# Patient Record
Sex: FEMALE | Race: WHITE | Employment: FULL TIME | URBAN - METROPOLITAN AREA
[De-identification: names, ages, dates, MRNs, and addresses within clinical notes are randomized per-mention and may not be internally consistent; named-entity substitution may affect disease eponyms.]

---

## 2017-01-23 RX ORDER — BUDESONIDE AND FORMOTEROL FUMARATE DIHYDRATE 80; 4.5 UG/1; UG/1
2 AEROSOL RESPIRATORY (INHALATION) 2 TIMES DAILY
Qty: 1 INHALER | Refills: 5 | Status: SHIPPED | OUTPATIENT
Start: 2017-01-23 | End: 2017-08-13 | Stop reason: SDUPTHER

## 2017-01-23 RX ORDER — OMEPRAZOLE 20 MG/1
CAPSULE, DELAYED RELEASE ORAL
Qty: 30 CAPSULE | Refills: 11 | Status: SHIPPED | OUTPATIENT
Start: 2017-01-23 | End: 2017-09-11 | Stop reason: SDUPTHER

## 2017-01-23 RX ORDER — MOMETASONE FUROATE 220 UG/1
INHALANT RESPIRATORY (INHALATION)
Qty: 1 INHALER | Refills: 5 | Status: SHIPPED | OUTPATIENT
Start: 2017-01-23 | End: 2017-09-15 | Stop reason: SDUPTHER

## 2017-07-18 RX ORDER — MONTELUKAST SODIUM 10 MG/1
TABLET ORAL
Qty: 30 TABLET | Refills: 11 | Status: SHIPPED | OUTPATIENT
Start: 2017-07-18 | End: 2018-08-14 | Stop reason: SDUPTHER

## 2017-08-14 RX ORDER — DILTIAZEM HYDROCHLORIDE 60 MG/1
TABLET, FILM COATED ORAL
Qty: 1 INHALER | Refills: 5 | Status: SHIPPED | OUTPATIENT
Start: 2017-08-14 | End: 2017-09-11 | Stop reason: SDUPTHER

## 2017-09-11 ENCOUNTER — OFFICE VISIT (OUTPATIENT)
Dept: FAMILY MEDICINE CLINIC | Age: 62
End: 2017-09-11
Payer: COMMERCIAL

## 2017-09-11 VITALS
DIASTOLIC BLOOD PRESSURE: 70 MMHG | HEIGHT: 61 IN | HEART RATE: 78 BPM | WEIGHT: 121 LBS | BODY MASS INDEX: 22.84 KG/M2 | SYSTOLIC BLOOD PRESSURE: 142 MMHG

## 2017-09-11 DIAGNOSIS — S46.002A: Primary | ICD-10-CM

## 2017-09-11 PROCEDURE — 99213 OFFICE O/P EST LOW 20 MIN: CPT | Performed by: FAMILY MEDICINE

## 2017-09-11 RX ORDER — PREDNISONE 20 MG/1
TABLET ORAL
Qty: 20 TABLET | Refills: 0 | Status: SHIPPED | OUTPATIENT
Start: 2017-09-11 | End: 2017-11-09 | Stop reason: ALTCHOICE

## 2017-09-11 ASSESSMENT — ENCOUNTER SYMPTOMS
DIARRHEA: 0
SHORTNESS OF BREATH: 0
SINUS PRESSURE: 0
SORE THROAT: 0
COUGH: 0
BACK PAIN: 0
NAUSEA: 0
VOMITING: 0

## 2017-09-11 ASSESSMENT — PATIENT HEALTH QUESTIONNAIRE - PHQ9
1. LITTLE INTEREST OR PLEASURE IN DOING THINGS: 0
SUM OF ALL RESPONSES TO PHQ QUESTIONS 1-9: 0
2. FEELING DOWN, DEPRESSED OR HOPELESS: 0
SUM OF ALL RESPONSES TO PHQ9 QUESTIONS 1 & 2: 0

## 2017-09-15 RX ORDER — MOMETASONE FUROATE 220 UG/1
INHALANT RESPIRATORY (INHALATION)
Qty: 3 INHALER | Refills: 3 | Status: SHIPPED | OUTPATIENT
Start: 2017-09-15 | End: 2018-02-19 | Stop reason: CLARIF

## 2017-11-09 ENCOUNTER — OFFICE VISIT (OUTPATIENT)
Dept: FAMILY MEDICINE CLINIC | Age: 62
End: 2017-11-09
Payer: COMMERCIAL

## 2017-11-09 VITALS
BODY MASS INDEX: 22.48 KG/M2 | HEART RATE: 83 BPM | SYSTOLIC BLOOD PRESSURE: 120 MMHG | WEIGHT: 119 LBS | DIASTOLIC BLOOD PRESSURE: 72 MMHG

## 2017-11-09 DIAGNOSIS — Z23 IMMUNIZATION DUE: ICD-10-CM

## 2017-11-09 DIAGNOSIS — K12.1 SOFT PALATE ULCERATION: Primary | ICD-10-CM

## 2017-11-09 PROCEDURE — 90688 IIV4 VACCINE SPLT 0.5 ML IM: CPT | Performed by: FAMILY MEDICINE

## 2017-11-09 PROCEDURE — 90471 IMMUNIZATION ADMIN: CPT | Performed by: FAMILY MEDICINE

## 2017-11-09 PROCEDURE — 99213 OFFICE O/P EST LOW 20 MIN: CPT | Performed by: FAMILY MEDICINE

## 2017-11-09 RX ORDER — CEPHALEXIN 500 MG/1
500 CAPSULE ORAL 3 TIMES DAILY
Qty: 30 CAPSULE | Refills: 0 | Status: SHIPPED | OUTPATIENT
Start: 2017-11-09 | End: 2018-02-19 | Stop reason: ALTCHOICE

## 2017-11-09 ASSESSMENT — ENCOUNTER SYMPTOMS
COUGH: 0
SORE THROAT: 0
NAUSEA: 0
DIARRHEA: 0
VOMITING: 0
SHORTNESS OF BREATH: 0
SINUS PRESSURE: 0
BACK PAIN: 0

## 2017-11-09 NOTE — TELEPHONE ENCOUNTER
Health Maintenance   Topic Date Due    Hepatitis C screen  1955    HIV screen  01/22/1969    DTaP/Tdap/Td vaccine (1 - Tdap) 01/22/1973    Cervical cancer screen  01/22/1975    Lipid screen  01/22/1994    Colon cancer screen colonoscopy  12/12/2016    Breast cancer screen  06/15/2017    Zostavax vaccine  Addressed    Flu vaccine  Completed    Pneumococcal med risk  Completed       No results found for: LABA1C          ( goal A1C is < 7)   No results found for: LABMICR  No results found for: LDLCHOLESTEROL, LDLCALC    (goal LDL is <100)   No results found for: AST, ALT, BUN  BP Readings from Last 3 Encounters:   11/09/17 120/72   09/11/17 (!) 142/70   12/28/16 110/80          (goal 120/80)    All Future Testing planned in [facility]      Next Visit Date:  No future appointments.          Patient Active Problem List:     GERD (gastroesophageal reflux disease)     Elevated lipids     Asthma

## 2017-11-09 NOTE — PROGRESS NOTES
Jeffery Jaime is a 60 y.o. female who presents today for her medical conditions/complaints as noted below. Jeffery Jaime is c/o of Mouth Injury (bleed on roof of mouth) and Health Maintenance (flu, colon)  Noticed some irritation and bleeding of her soft palate last pm.    HPI:     Visit Information    Have you changed or started any medications since your last visit including any over-the-counter medicines, vitamins, or herbal medicines? no   Are you having any side effects from any of your medications? -  no  Have you stopped taking any of your medications? Is so, why? -  no    Have you seen any other physician or provider since your last visit? No  Have you had any other diagnostic tests since your last visit? No  Have you been seen in the emergency room and/or had an admission to a hospital since we last saw you? No  Have you had your routine dental cleaning in the past 6 months? yes -     Have you activated your [email or web address] account? If not, what are your barriers? Yes     Patient Care Team:  Shakira Silver MD as PCP - General (Family Medicine)  Shakira Silver MD as PCP - S Attributed Provider    Medical History Review  Past Medical, Family, and Social History reviewed and  contribute to the patient presenting condition    Health Maintenance   Topic Date Due    Hepatitis C screen  1955    HIV screen  01/22/1969    DTaP/Tdap/Td vaccine (1 - Tdap) 01/22/1973    Cervical cancer screen  01/22/1975    Lipid screen  01/22/1994    Colon cancer screen colonoscopy  12/12/2016    Breast cancer screen  06/15/2017    Flu vaccine (1) 09/01/2017    Zostavax vaccine  Addressed    Pneumococcal med risk  Completed       Past Medical History:   Diagnosis Date    GERD (gastroesophageal reflux disease)     Hyperlipidemia     RAD (reactive airway disease)       Past Surgical History:   Procedure Laterality Date    COLONOSCOPY  2007; due 2017     No family history on file.   Social History Mouth/Throat: Oral lesions (ulceration and soft tissue swelling under dentures of soft palate right of midline.) present. No oropharyngeal exudate. Eyes: No scleral icterus. Neck: Neck supple. Carotid bruit is not present. Cardiovascular: Exam reveals no gallop and no friction rub. No murmur heard. Pulmonary/Chest: No respiratory distress. She has no wheezes. She has no rales. She exhibits no tenderness. Musculoskeletal: She exhibits no edema. Lymphadenopathy:     She has no cervical adenopathy. Neurological: She is alert and oriented to person, place, and time. No cranial nerve deficit. Skin: No rash noted. She is not diaphoretic. Psychiatric: She has a normal mood and affect. Her behavior is normal. Judgment and thought content normal.       Assessment:      1. Soft palate ulceration  cephALEXin (KEFLEX) 500 MG capsule   2. Immunization due  INFLUENZA, QUADV, 3 YRS AND OLDER, IM, MDV, 0.5ML ([facility])         Plan:      No Follow-up on file.     Orders Placed This Encounter   Procedures    INFLUENZA, QUADV, 3 YRS AND OLDER, IM, MDV, 0.5ML (FLUZONE QUADV)     Orders Placed This Encounter   Medications    cephALEXin (KEFLEX) 500 MG capsule     Sig: Take 1 capsule by mouth 3 times daily     Dispense:  30 capsule     Refill:  0

## 2017-11-10 RX ORDER — BUDESONIDE AND FORMOTEROL FUMARATE DIHYDRATE 80; 4.5 UG/1; UG/1
AEROSOL RESPIRATORY (INHALATION)
Qty: 1 INHALER | Refills: 5 | Status: SHIPPED | OUTPATIENT
Start: 2017-11-10 | End: 2018-02-22 | Stop reason: SDUPTHER

## 2018-02-14 RX ORDER — OMEPRAZOLE 20 MG/1
CAPSULE, DELAYED RELEASE ORAL
Qty: 30 CAPSULE | Refills: 11 | Status: SHIPPED | OUTPATIENT
Start: 2018-02-14 | End: 2018-02-21 | Stop reason: SDUPTHER

## 2018-02-19 ENCOUNTER — OFFICE VISIT (OUTPATIENT)
Dept: FAMILY MEDICINE CLINIC | Age: 63
End: 2018-02-19
Payer: COMMERCIAL

## 2018-02-19 VITALS
WEIGHT: 125 LBS | HEART RATE: 78 BPM | DIASTOLIC BLOOD PRESSURE: 80 MMHG | SYSTOLIC BLOOD PRESSURE: 140 MMHG | OXYGEN SATURATION: 94 % | BODY MASS INDEX: 23.62 KG/M2

## 2018-02-19 DIAGNOSIS — J45.909 MODERATE ASTHMA WITHOUT COMPLICATION, UNSPECIFIED WHETHER PERSISTENT: Primary | Chronic | ICD-10-CM

## 2018-02-19 PROCEDURE — 99213 OFFICE O/P EST LOW 20 MIN: CPT | Performed by: FAMILY MEDICINE

## 2018-02-19 RX ORDER — ALBUTEROL SULFATE 90 UG/1
2 AEROSOL, METERED RESPIRATORY (INHALATION) EVERY 6 HOURS PRN
Qty: 1 INHALER | Refills: 3 | Status: SHIPPED | OUTPATIENT
Start: 2018-02-19 | End: 2020-10-26 | Stop reason: SDUPTHER

## 2018-02-19 ASSESSMENT — ENCOUNTER SYMPTOMS
SHORTNESS OF BREATH: 0
SORE THROAT: 0
DIARRHEA: 0
VOMITING: 0
NAUSEA: 0
SINUS PRESSURE: 0
COUGH: 1
BACK PAIN: 0

## 2018-02-21 RX ORDER — OMEPRAZOLE 20 MG/1
CAPSULE, DELAYED RELEASE ORAL
Qty: 30 CAPSULE | Refills: 11 | Status: SHIPPED | OUTPATIENT
Start: 2018-02-21 | End: 2019-03-02 | Stop reason: SDUPTHER

## 2018-02-21 NOTE — TELEPHONE ENCOUNTER
Next Visit Date:  No future appointments.     Health Maintenance   Topic Date Due    Hepatitis C screen  1955    HIV screen  01/22/1969    DTaP/Tdap/Td vaccine (1 - Tdap) 01/22/1973    Cervical cancer screen  01/22/1975    Lipid screen  01/22/1994    Shingles Vaccine (1 of 2 - 2 Dose Series) 01/22/2004    Colon cancer screen colonoscopy  12/12/2016    Breast cancer screen  06/15/2017    Flu vaccine  Completed    Pneumococcal med risk  Completed       No results found for: LABA1C          ( goal A1C is < 7)   No results found for: LABMICR  No results found for: LDLCHOLESTEROL, LDLCALC    (goal LDL is <100)   No results found for: AST, ALT, BUN  BP Readings from Last 3 Encounters:   02/19/18 (!) 140/80   11/09/17 120/72   09/11/17 (!) 142/70          (goal 120/80)    All Future Testing planned in [facility]              Patient Active Problem List:     GERD (gastroesophageal reflux disease)     Elevated lipids     Asthma

## 2018-02-22 RX ORDER — BUDESONIDE AND FORMOTEROL FUMARATE DIHYDRATE 80; 4.5 UG/1; UG/1
AEROSOL RESPIRATORY (INHALATION)
Qty: 1 INHALER | Refills: 5 | Status: SHIPPED | OUTPATIENT
Start: 2018-02-22 | End: 2018-10-17 | Stop reason: SDUPTHER

## 2018-02-27 ENCOUNTER — TELEPHONE (OUTPATIENT)
Dept: FAMILY MEDICINE CLINIC | Age: 63
End: 2018-02-27

## 2018-06-08 ENCOUNTER — OFFICE VISIT (OUTPATIENT)
Dept: FAMILY MEDICINE CLINIC | Age: 63
End: 2018-06-08
Payer: COMMERCIAL

## 2018-06-08 VITALS
HEART RATE: 86 BPM | OXYGEN SATURATION: 98 % | DIASTOLIC BLOOD PRESSURE: 84 MMHG | BODY MASS INDEX: 23.24 KG/M2 | SYSTOLIC BLOOD PRESSURE: 130 MMHG | WEIGHT: 123 LBS

## 2018-06-08 DIAGNOSIS — H66.001 ACUTE SUPPURATIVE OTITIS MEDIA OF RIGHT EAR WITHOUT SPONTANEOUS RUPTURE OF TYMPANIC MEMBRANE, RECURRENCE NOT SPECIFIED: Primary | ICD-10-CM

## 2018-06-08 DIAGNOSIS — R53.83 FATIGUE, UNSPECIFIED TYPE: ICD-10-CM

## 2018-06-08 DIAGNOSIS — J45.909 MODERATE ASTHMA WITHOUT COMPLICATION, UNSPECIFIED WHETHER PERSISTENT: ICD-10-CM

## 2018-06-08 DIAGNOSIS — R06.02 SOB (SHORTNESS OF BREATH): ICD-10-CM

## 2018-06-08 PROCEDURE — 99214 OFFICE O/P EST MOD 30 MIN: CPT | Performed by: FAMILY MEDICINE

## 2018-06-08 RX ORDER — AMOXICILLIN 875 MG/1
875 TABLET, COATED ORAL 2 TIMES DAILY
Qty: 20 TABLET | Refills: 0 | Status: SHIPPED | OUTPATIENT
Start: 2018-06-08 | End: 2018-06-18

## 2018-07-06 ENCOUNTER — OFFICE VISIT (OUTPATIENT)
Dept: FAMILY MEDICINE CLINIC | Age: 63
End: 2018-07-06
Payer: COMMERCIAL

## 2018-07-06 VITALS
DIASTOLIC BLOOD PRESSURE: 74 MMHG | SYSTOLIC BLOOD PRESSURE: 130 MMHG | BODY MASS INDEX: 23.05 KG/M2 | HEART RATE: 83 BPM | WEIGHT: 122 LBS

## 2018-07-06 DIAGNOSIS — R23.8 SKIN SENSITIVITY: Primary | ICD-10-CM

## 2018-07-06 DIAGNOSIS — H66.001 ACUTE SUPPURATIVE OTITIS MEDIA OF RIGHT EAR WITHOUT SPONTANEOUS RUPTURE OF TYMPANIC MEMBRANE, RECURRENCE NOT SPECIFIED: ICD-10-CM

## 2018-07-06 PROCEDURE — 99213 OFFICE O/P EST LOW 20 MIN: CPT | Performed by: FAMILY MEDICINE

## 2018-07-06 RX ORDER — CLOTRIMAZOLE AND BETAMETHASONE DIPROPIONATE 10; .64 MG/G; MG/G
CREAM TOPICAL
Qty: 15 G | Refills: 1 | Status: SHIPPED | OUTPATIENT
Start: 2018-07-06 | End: 2018-10-05 | Stop reason: SDUPTHER

## 2018-07-06 ASSESSMENT — ENCOUNTER SYMPTOMS
COUGH: 0
VOMITING: 0
SINUS PRESSURE: 0
SHORTNESS OF BREATH: 0
BACK PAIN: 0
SORE THROAT: 0
DIARRHEA: 0
NAUSEA: 0

## 2018-08-14 RX ORDER — MONTELUKAST SODIUM 10 MG/1
TABLET ORAL
Qty: 30 TABLET | Refills: 11 | Status: SHIPPED | OUTPATIENT
Start: 2018-08-14 | End: 2019-09-18 | Stop reason: SDUPTHER

## 2018-08-14 NOTE — TELEPHONE ENCOUNTER
Health Maintenance   Topic Date Due    Hepatitis C screen  1955    HIV screen  01/22/1969    DTaP/Tdap/Td vaccine (1 - Tdap) 01/22/1973    Cervical cancer screen  01/22/1975    Lipid screen  01/22/1994    Shingles Vaccine (1 of 2 - 2 Dose Series) 01/22/2004    Colon cancer screen colonoscopy  12/12/2016    Breast cancer screen  06/15/2017    Flu vaccine (1) 09/01/2018    Pneumococcal med risk  Completed       No results found for: LABA1C          ( goal A1C is < 7)   No results found for: LABMICR  No results found for: LDLCHOLESTEROL, LDLCALC    (goal LDL is <100)   No results found for: AST, ALT, BUN  BP Readings from Last 3 Encounters:   07/06/18 130/74   06/08/18 130/84   02/19/18 (!) 140/80          (goal 120/80)    All Future Testing planned in [facility]  Lab Frequency Next Occurrence   Comprehensive Metabolic Panel Once 75/82/1734   CBC Auto Differential Once 06/08/2018   TSH with Reflex Once 06/08/2018       Next Visit Date:  No future appointments.          Patient Active Problem List:     GERD (gastroesophageal reflux disease)     Elevated lipids     Asthma

## 2018-10-05 DIAGNOSIS — R23.8 SKIN SENSITIVITY: ICD-10-CM

## 2018-10-08 RX ORDER — CLOTRIMAZOLE AND BETAMETHASONE DIPROPIONATE 10; .64 MG/G; MG/G
CREAM TOPICAL
Qty: 1 TUBE | Refills: 1 | Status: SHIPPED | OUTPATIENT
Start: 2018-10-08 | End: 2019-10-14 | Stop reason: SDUPTHER

## 2018-10-17 RX ORDER — DILTIAZEM HYDROCHLORIDE 60 MG/1
TABLET, FILM COATED ORAL
Qty: 1 INHALER | Refills: 5 | Status: SHIPPED | OUTPATIENT
Start: 2018-10-17 | End: 2019-06-06 | Stop reason: SDUPTHER

## 2019-02-04 ENCOUNTER — OFFICE VISIT (OUTPATIENT)
Dept: FAMILY MEDICINE CLINIC | Age: 64
End: 2019-02-04
Payer: COMMERCIAL

## 2019-02-04 VITALS
DIASTOLIC BLOOD PRESSURE: 70 MMHG | SYSTOLIC BLOOD PRESSURE: 126 MMHG | OXYGEN SATURATION: 97 % | BODY MASS INDEX: 24.8 KG/M2 | TEMPERATURE: 98.9 F | HEIGHT: 59 IN | WEIGHT: 123 LBS | HEART RATE: 83 BPM

## 2019-02-04 DIAGNOSIS — R19.7 DIARRHEA, UNSPECIFIED TYPE: Primary | ICD-10-CM

## 2019-02-04 PROCEDURE — 99213 OFFICE O/P EST LOW 20 MIN: CPT | Performed by: FAMILY MEDICINE

## 2019-02-04 RX ORDER — LOPERAMIDE HYDROCHLORIDE 2 MG/1
2 CAPSULE ORAL 4 TIMES DAILY PRN
Qty: 24 CAPSULE | Refills: 0 | Status: SHIPPED | OUTPATIENT
Start: 2019-02-04 | End: 2019-02-07 | Stop reason: SDUPTHER

## 2019-02-04 ASSESSMENT — ENCOUNTER SYMPTOMS
SHORTNESS OF BREATH: 0
SINUS PRESSURE: 0
DIARRHEA: 1
VOMITING: 0
SORE THROAT: 0
ABDOMINAL PAIN: 0
ANAL BLEEDING: 0
BACK PAIN: 0
COUGH: 1
NAUSEA: 0
ABDOMINAL DISTENTION: 0

## 2019-02-04 ASSESSMENT — PATIENT HEALTH QUESTIONNAIRE - PHQ9
SUM OF ALL RESPONSES TO PHQ QUESTIONS 1-9: 0
SUM OF ALL RESPONSES TO PHQ9 QUESTIONS 1 & 2: 0
2. FEELING DOWN, DEPRESSED OR HOPELESS: 0
1. LITTLE INTEREST OR PLEASURE IN DOING THINGS: 0
SUM OF ALL RESPONSES TO PHQ QUESTIONS 1-9: 0

## 2019-02-07 DIAGNOSIS — R19.7 DIARRHEA, UNSPECIFIED TYPE: ICD-10-CM

## 2019-02-07 RX ORDER — LOPERAMIDE HYDROCHLORIDE 2 MG/1
CAPSULE ORAL
Qty: 24 CAPSULE | Refills: 0 | Status: SHIPPED | OUTPATIENT
Start: 2019-02-07 | End: 2021-01-28

## 2019-02-22 DIAGNOSIS — Z12.39 BREAST CANCER SCREENING: Primary | ICD-10-CM

## 2019-04-01 RX ORDER — OMEPRAZOLE 20 MG/1
CAPSULE, DELAYED RELEASE ORAL
Qty: 30 CAPSULE | Refills: 3 | Status: SHIPPED | OUTPATIENT
Start: 2019-04-01 | End: 2019-08-13 | Stop reason: SDUPTHER

## 2019-06-06 RX ORDER — DILTIAZEM HYDROCHLORIDE 60 MG/1
TABLET, FILM COATED ORAL
Qty: 1 INHALER | Refills: 5 | Status: SHIPPED | OUTPATIENT
Start: 2019-06-06 | End: 2019-11-07 | Stop reason: SDUPTHER

## 2019-08-13 RX ORDER — OMEPRAZOLE 20 MG/1
CAPSULE, DELAYED RELEASE ORAL
Qty: 30 CAPSULE | Refills: 2 | Status: SHIPPED | OUTPATIENT
Start: 2019-08-13 | End: 2019-11-07 | Stop reason: SDUPTHER

## 2019-08-13 NOTE — TELEPHONE ENCOUNTER
Next Visit Date:  No future appointments.     Health Maintenance   Topic Date Due    Hepatitis C screen  1955    HIV screen  01/22/1969    DTaP/Tdap/Td vaccine (1 - Tdap) 01/22/1973    Cervical cancer screen  01/22/1975    Lipid screen  01/22/1994    Shingles Vaccine (1 of 2) 01/22/2004    Colon cancer screen colonoscopy  12/12/2016    Breast cancer screen  06/15/2017    DEXA (modify frequency per FRAX score)  01/22/2019    Pneumococcal 65+ years Vaccine (1 of 2 - PCV13) 01/22/2019    Flu vaccine (1) 09/01/2019       No results found for: LABA1C          ( goal A1C is < 7)   No results found for: LABMICR  No results found for: LDLCHOLESTEROL, LDLCALC    (goal LDL is <100)   No results found for: AST, ALT, BUN  BP Readings from Last 3 Encounters:   02/04/19 126/70   07/06/18 130/74   06/08/18 130/84          (goal 120/80)    All Future Testing planned in [facility]  Lab Frequency Next Occurrence   LINDA DIGITAL SCREEN SELF REFERRAL W OR WO CAD BILATERAL Once 02/22/2019               Patient Active Problem List:     GERD (gastroesophageal reflux disease)     Elevated lipids     Asthma

## 2019-10-14 ENCOUNTER — OFFICE VISIT (OUTPATIENT)
Dept: FAMILY MEDICINE CLINIC | Age: 64
End: 2019-10-14
Payer: COMMERCIAL

## 2019-10-14 VITALS
OXYGEN SATURATION: 98 % | BODY MASS INDEX: 25.55 KG/M2 | SYSTOLIC BLOOD PRESSURE: 118 MMHG | WEIGHT: 126.5 LBS | DIASTOLIC BLOOD PRESSURE: 76 MMHG | HEART RATE: 64 BPM

## 2019-10-14 DIAGNOSIS — J45.909 MODERATE ASTHMA WITHOUT COMPLICATION, UNSPECIFIED WHETHER PERSISTENT: Chronic | ICD-10-CM

## 2019-10-14 DIAGNOSIS — R23.8 SKIN SENSITIVITY: ICD-10-CM

## 2019-10-14 DIAGNOSIS — R42 VERTIGO: Primary | ICD-10-CM

## 2019-10-14 DIAGNOSIS — Z00.00 PHYSICAL EXAM, ANNUAL: ICD-10-CM

## 2019-10-14 PROCEDURE — 90688 IIV4 VACCINE SPLT 0.5 ML IM: CPT | Performed by: FAMILY MEDICINE

## 2019-10-14 PROCEDURE — 99213 OFFICE O/P EST LOW 20 MIN: CPT | Performed by: FAMILY MEDICINE

## 2019-10-14 PROCEDURE — 90471 IMMUNIZATION ADMIN: CPT | Performed by: FAMILY MEDICINE

## 2019-10-14 RX ORDER — MECLIZINE HYDROCHLORIDE 25 MG/1
25 TABLET ORAL 3 TIMES DAILY PRN
Qty: 30 TABLET | Refills: 2 | Status: SHIPPED | OUTPATIENT
Start: 2019-10-14 | End: 2019-10-24

## 2019-10-14 RX ORDER — CLOTRIMAZOLE AND BETAMETHASONE DIPROPIONATE 10; .64 MG/G; MG/G
CREAM TOPICAL
Qty: 1 TUBE | Refills: 2 | Status: SHIPPED | OUTPATIENT
Start: 2019-10-14 | End: 2021-01-28 | Stop reason: SDUPTHER

## 2019-10-14 ASSESSMENT — ENCOUNTER SYMPTOMS
COUGH: 0
SINUS PRESSURE: 0
VOMITING: 0
SHORTNESS OF BREATH: 0
SORE THROAT: 0
DIARRHEA: 0
BACK PAIN: 0
NAUSEA: 0

## 2019-10-20 LAB
ABSOLUTE BASO #: 0 X10E9/L (ref 0–0.9)
ABSOLUTE EOS #: 0.4 X10E9/L (ref 0–0.4)
ABSOLUTE LYMPH #: 1.4 X10E9/L (ref 1–3.5)
ABSOLUTE MONO #: 0.4 X10E9/L (ref 0–0.9)
ABSOLUTE NEUT #: 3.9 X10E9/L (ref 1.5–6.6)
ALBUMIN SERPL-MCNC: 4.1 G/DL (ref 3.2–5.3)
ALK PHOSPHATASE: 88 U/L (ref 39–130)
ALT SERPL-CCNC: 12 U/L (ref 0–31)
ANION GAP SERPL CALCULATED.3IONS-SCNC: 7 MMOL/L (ref 4–12)
AST SERPL-CCNC: 18 U/L (ref 0–41)
BASOPHILS RELATIVE PERCENT: 0.4 %
BILIRUB SERPL-MCNC: 1 MG/DL (ref 0.3–1.2)
BUN BLDV-MCNC: 14 MG/DL (ref 5–27)
CALCIUM SERPL-MCNC: 9.4 MG/DL (ref 8.5–10.5)
CHLORIDE BLD-SCNC: 106 MMOL/L (ref 98–109)
CHOLESTEROL/HDL RATIO: 5.5 (ref 1–5)
CHOLESTEROL: 260 MG/DL (ref 150–200)
CO2: 30 MMOL/L (ref 22–32)
CREAT SERPL-MCNC: 0.96 MG/DL (ref 0.4–1)
EGFR AFRICAN AMERICAN: >60 ML/MIN/1.73SQ.M
EGFR IF NONAFRICAN AMERICAN: 58 ML/MIN/1.73SQ.M
EOSINOPHILS RELATIVE PERCENT: 6.8 %
GLUCOSE: 84 MG/DL (ref 65–99)
HCT VFR BLD CALC: 40.8 % (ref 35–47)
HDLC SERPL-MCNC: 47 MG/DL
HEMOGLOBIN: 13.2 G/DL (ref 11.7–16.1)
LDL CHOLESTEROL CALCULATED: 185 MG/DL
LDL/HDL RATIO: 3.9
LYMPHOCYTE %: 22.2 %
MCH RBC QN AUTO: 25.8 PG (ref 27–35)
MCHC RBC AUTO-ENTMCNC: 32.3 G/DL (ref 31–36)
MCV RBC AUTO: 80 FL (ref 81–101)
MONOCYTES # BLD: 6.4 %
NEUTROPHILS RELATIVE PERCENT: 64.2 %
PDW BLD-RTO: 13.9 % (ref 11.5–14.7)
PLATELETS: 288 X10E9/L (ref 150–450)
PMV BLD AUTO: 8.9 FL (ref 7–12)
POTASSIUM SERPL-SCNC: 4.4 MMOL/L (ref 3.5–5)
RBC: 5.11 X10E12/L (ref 3.55–5.2)
SODIUM BLD-SCNC: 143 MMOL/L (ref 134–146)
T4 TOTAL: 7.6 UG/DL (ref 6.1–12.2)
TOTAL PROTEIN: 6.9 G/DL (ref 6–8)
TRIGL SERPL-MCNC: 138 MG/DL (ref 27–150)
TSH SERPL DL<=0.05 MIU/L-ACNC: 0.88 UIU/ML (ref 0.49–4.67)
VLDLC SERPL CALC-MCNC: 28 MG/DL (ref 0–30)
WBC: 6.1 X10E9/L (ref 4–11.8)

## 2019-10-24 RX ORDER — MONTELUKAST SODIUM 10 MG/1
TABLET ORAL
Qty: 30 TABLET | Refills: 0 | Status: SHIPPED | OUTPATIENT
Start: 2019-10-24 | End: 2019-11-07 | Stop reason: SDUPTHER

## 2019-11-07 ENCOUNTER — OFFICE VISIT (OUTPATIENT)
Dept: FAMILY MEDICINE CLINIC | Age: 64
End: 2019-11-07
Payer: COMMERCIAL

## 2019-11-07 VITALS
SYSTOLIC BLOOD PRESSURE: 118 MMHG | WEIGHT: 124.4 LBS | DIASTOLIC BLOOD PRESSURE: 80 MMHG | HEART RATE: 75 BPM | BODY MASS INDEX: 25.13 KG/M2 | OXYGEN SATURATION: 97 %

## 2019-11-07 DIAGNOSIS — E78.5 ELEVATED LIPIDS: Primary | Chronic | ICD-10-CM

## 2019-11-07 PROCEDURE — 99213 OFFICE O/P EST LOW 20 MIN: CPT | Performed by: FAMILY MEDICINE

## 2019-11-07 RX ORDER — MONTELUKAST SODIUM 10 MG/1
TABLET ORAL
Qty: 90 TABLET | Refills: 3 | Status: SHIPPED | OUTPATIENT
Start: 2019-11-07 | End: 2020-10-26 | Stop reason: SDUPTHER

## 2019-11-07 RX ORDER — BUDESONIDE AND FORMOTEROL FUMARATE DIHYDRATE 80; 4.5 UG/1; UG/1
AEROSOL RESPIRATORY (INHALATION)
Qty: 3 INHALER | Refills: 3 | Status: SHIPPED
Start: 2019-11-07 | End: 2020-08-31 | Stop reason: DRUGHIGH

## 2019-11-07 RX ORDER — OMEPRAZOLE 20 MG/1
CAPSULE, DELAYED RELEASE ORAL
Qty: 90 CAPSULE | Refills: 3 | Status: SHIPPED | OUTPATIENT
Start: 2019-11-07 | End: 2020-11-20

## 2019-11-07 ASSESSMENT — ENCOUNTER SYMPTOMS
DIARRHEA: 0
NAUSEA: 0
VOMITING: 0
SORE THROAT: 0
SINUS PRESSURE: 0
COUGH: 0
BACK PAIN: 0
SHORTNESS OF BREATH: 0

## 2020-02-19 ENCOUNTER — OFFICE VISIT (OUTPATIENT)
Dept: FAMILY MEDICINE CLINIC | Age: 65
End: 2020-02-19
Payer: MEDICARE

## 2020-02-19 VITALS
DIASTOLIC BLOOD PRESSURE: 84 MMHG | OXYGEN SATURATION: 95 % | SYSTOLIC BLOOD PRESSURE: 122 MMHG | BODY MASS INDEX: 25.17 KG/M2 | WEIGHT: 124.6 LBS | HEART RATE: 83 BPM

## 2020-02-19 PROCEDURE — 1090F PRES/ABSN URINE INCON ASSESS: CPT | Performed by: NURSE PRACTITIONER

## 2020-02-19 PROCEDURE — G8427 DOCREV CUR MEDS BY ELIG CLIN: HCPCS | Performed by: NURSE PRACTITIONER

## 2020-02-19 PROCEDURE — G8417 CALC BMI ABV UP PARAM F/U: HCPCS | Performed by: NURSE PRACTITIONER

## 2020-02-19 PROCEDURE — 4040F PNEUMOC VAC/ADMIN/RCVD: CPT | Performed by: NURSE PRACTITIONER

## 2020-02-19 PROCEDURE — G8482 FLU IMMUNIZE ORDER/ADMIN: HCPCS | Performed by: NURSE PRACTITIONER

## 2020-02-19 PROCEDURE — 99213 OFFICE O/P EST LOW 20 MIN: CPT | Performed by: NURSE PRACTITIONER

## 2020-02-19 PROCEDURE — G8400 PT W/DXA NO RESULTS DOC: HCPCS | Performed by: NURSE PRACTITIONER

## 2020-02-19 PROCEDURE — 3017F COLORECTAL CA SCREEN DOC REV: CPT | Performed by: NURSE PRACTITIONER

## 2020-02-19 PROCEDURE — 1036F TOBACCO NON-USER: CPT | Performed by: NURSE PRACTITIONER

## 2020-02-19 PROCEDURE — 1123F ACP DISCUSS/DSCN MKR DOCD: CPT | Performed by: NURSE PRACTITIONER

## 2020-02-19 ASSESSMENT — PATIENT HEALTH QUESTIONNAIRE - PHQ9
SUM OF ALL RESPONSES TO PHQ9 QUESTIONS 1 & 2: 0
2. FEELING DOWN, DEPRESSED OR HOPELESS: 0
SUM OF ALL RESPONSES TO PHQ QUESTIONS 1-9: 0
SUM OF ALL RESPONSES TO PHQ QUESTIONS 1-9: 0
1. LITTLE INTEREST OR PLEASURE IN DOING THINGS: 0

## 2020-02-19 ASSESSMENT — ENCOUNTER SYMPTOMS
DIARRHEA: 0
VOMITING: 0
NAUSEA: 0
COLOR CHANGE: 0
RESPIRATORY NEGATIVE: 1
GASTROINTESTINAL NEGATIVE: 1
ALLERGIC/IMMUNOLOGIC NEGATIVE: 1
EYES NEGATIVE: 1

## 2020-05-12 ENCOUNTER — TELEMEDICINE (OUTPATIENT)
Dept: FAMILY MEDICINE CLINIC | Age: 65
End: 2020-05-12
Payer: MEDICARE

## 2020-05-12 ENCOUNTER — HOSPITAL ENCOUNTER (OUTPATIENT)
Age: 65
Discharge: HOME | End: 2020-05-12
Payer: MEDICARE

## 2020-05-12 ENCOUNTER — HOSPITAL ENCOUNTER (OUTPATIENT)
Age: 65
Discharge: HOME | End: 2020-05-14
Payer: MEDICARE

## 2020-05-12 ENCOUNTER — NURSE TRIAGE (OUTPATIENT)
Dept: OTHER | Facility: CLINIC | Age: 65
End: 2020-05-12

## 2020-05-12 ENCOUNTER — HOSPITAL ENCOUNTER (OUTPATIENT)
Dept: GENERAL RADIOLOGY | Age: 65
Discharge: HOME | End: 2020-05-14
Payer: MEDICARE

## 2020-05-12 LAB
ABSOLUTE EOS #: 0.67 K/UL (ref 0–0.44)
ABSOLUTE IMMATURE GRANULOCYTE: <0.03 K/UL (ref 0–0.3)
ABSOLUTE LYMPH #: 2.74 K/UL (ref 1.1–3.7)
ABSOLUTE MONO #: 0.49 K/UL (ref 0.1–1.2)
ALBUMIN SERPL-MCNC: 4.2 G/DL (ref 3.5–5.2)
ALBUMIN/GLOBULIN RATIO: 1.2 (ref 1–2.5)
ALP BLD-CCNC: 95 U/L (ref 35–104)
ALT SERPL-CCNC: 13 U/L (ref 5–33)
ANION GAP SERPL CALCULATED.3IONS-SCNC: 14 MMOL/L (ref 9–17)
AST SERPL-CCNC: 24 U/L
BASOPHILS # BLD: 0 % (ref 0–2)
BASOPHILS ABSOLUTE: 0.03 K/UL (ref 0–0.2)
BILIRUB SERPL-MCNC: 0.72 MG/DL (ref 0.3–1.2)
BUN BLDV-MCNC: 13 MG/DL (ref 8–23)
BUN/CREAT BLD: NORMAL (ref 9–20)
CALCIUM SERPL-MCNC: 9.3 MG/DL (ref 8.6–10.4)
CHLORIDE BLD-SCNC: 103 MMOL/L (ref 98–107)
CO2: 26 MMOL/L (ref 20–31)
CREAT SERPL-MCNC: 0.78 MG/DL (ref 0.5–0.9)
DIFFERENTIAL TYPE: ABNORMAL
EOSINOPHILS RELATIVE PERCENT: 7 % (ref 1–4)
FERRITIN: 43 UG/L (ref 13–150)
FOLATE: >20 NG/ML
GFR AFRICAN AMERICAN: >60 ML/MIN
GFR NON-AFRICAN AMERICAN: >60 ML/MIN
GFR SERPL CREATININE-BSD FRML MDRD: NORMAL ML/MIN/{1.73_M2}
GFR SERPL CREATININE-BSD FRML MDRD: NORMAL ML/MIN/{1.73_M2}
GLUCOSE BLD-MCNC: 78 MG/DL (ref 70–99)
HCT VFR BLD CALC: 44.3 % (ref 36.3–47.1)
HEMOGLOBIN: 13.8 G/DL (ref 11.9–15.1)
IMMATURE GRANULOCYTES: 0 %
IRON SATURATION: 17 % (ref 20–55)
IRON: 65 UG/DL (ref 37–145)
LYMPHOCYTES # BLD: 27 % (ref 24–43)
MCH RBC QN AUTO: 25.8 PG (ref 25.2–33.5)
MCHC RBC AUTO-ENTMCNC: 31.2 G/DL (ref 28.4–34.8)
MCV RBC AUTO: 82.8 FL (ref 82.6–102.9)
MONOCYTES # BLD: 5 % (ref 3–12)
NRBC AUTOMATED: 0 PER 100 WBC
PDW BLD-RTO: 13.2 % (ref 11.8–14.4)
PLATELET # BLD: 321 K/UL (ref 138–453)
PLATELET ESTIMATE: ABNORMAL
PMV BLD AUTO: 10.8 FL (ref 8.1–13.5)
POTASSIUM SERPL-SCNC: 4.4 MMOL/L (ref 3.7–5.3)
RBC # BLD: 5.35 M/UL (ref 3.95–5.11)
RBC # BLD: ABNORMAL 10*6/UL
SEG NEUTROPHILS: 61 % (ref 36–65)
SEGMENTED NEUTROPHILS ABSOLUTE COUNT: 6.2 K/UL (ref 1.5–8.1)
SODIUM BLD-SCNC: 143 MMOL/L (ref 135–144)
TOTAL IRON BINDING CAPACITY: 383 UG/DL (ref 250–450)
TOTAL PROTEIN: 7.6 G/DL (ref 6.4–8.3)
UNSATURATED IRON BINDING CAPACITY: 318 UG/DL (ref 112–347)
VITAMIN B-12: 469 PG/ML (ref 232–1245)
WBC # BLD: 10.2 K/UL (ref 3.5–11.3)
WBC # BLD: ABNORMAL 10*3/UL

## 2020-05-12 PROCEDURE — 82607 VITAMIN B-12: CPT

## 2020-05-12 PROCEDURE — G8400 PT W/DXA NO RESULTS DOC: HCPCS | Performed by: FAMILY MEDICINE

## 2020-05-12 PROCEDURE — 83540 ASSAY OF IRON: CPT

## 2020-05-12 PROCEDURE — 82746 ASSAY OF FOLIC ACID SERUM: CPT

## 2020-05-12 PROCEDURE — 1123F ACP DISCUSS/DSCN MKR DOCD: CPT | Performed by: FAMILY MEDICINE

## 2020-05-12 PROCEDURE — G8427 DOCREV CUR MEDS BY ELIG CLIN: HCPCS | Performed by: FAMILY MEDICINE

## 2020-05-12 PROCEDURE — 36415 COLL VENOUS BLD VENIPUNCTURE: CPT

## 2020-05-12 PROCEDURE — 1090F PRES/ABSN URINE INCON ASSESS: CPT | Performed by: FAMILY MEDICINE

## 2020-05-12 PROCEDURE — 85025 COMPLETE CBC W/AUTO DIFF WBC: CPT

## 2020-05-12 PROCEDURE — 80053 COMPREHEN METABOLIC PANEL: CPT

## 2020-05-12 PROCEDURE — 4040F PNEUMOC VAC/ADMIN/RCVD: CPT | Performed by: FAMILY MEDICINE

## 2020-05-12 PROCEDURE — 82728 ASSAY OF FERRITIN: CPT

## 2020-05-12 PROCEDURE — 71046 X-RAY EXAM CHEST 2 VIEWS: CPT

## 2020-05-12 PROCEDURE — 83550 IRON BINDING TEST: CPT

## 2020-05-12 PROCEDURE — 99213 OFFICE O/P EST LOW 20 MIN: CPT | Performed by: FAMILY MEDICINE

## 2020-05-12 PROCEDURE — 3017F COLORECTAL CA SCREEN DOC REV: CPT | Performed by: FAMILY MEDICINE

## 2020-05-12 RX ORDER — ALBUTEROL SULFATE 90 UG/1
2 AEROSOL, METERED RESPIRATORY (INHALATION) 4 TIMES DAILY PRN
Qty: 1 INHALER | Refills: 2 | Status: SHIPPED | OUTPATIENT
Start: 2020-05-12 | End: 2020-10-26

## 2020-05-12 RX ORDER — AZITHROMYCIN 250 MG/1
TABLET, FILM COATED ORAL
Qty: 1 PACKET | Refills: 0 | Status: SHIPPED | OUTPATIENT
Start: 2020-05-12 | End: 2020-08-31

## 2020-05-12 ASSESSMENT — ENCOUNTER SYMPTOMS
SINUS PRESSURE: 0
SHORTNESS OF BREATH: 1
SORE THROAT: 0
DIARRHEA: 0
VOMITING: 0
BACK PAIN: 0
NAUSEA: 0
COUGH: 1

## 2020-05-12 NOTE — PROGRESS NOTES
2020    TELEHEALTH EVALUATION -- Audio/Visual (During PXLIB-75 public health emergency)    HPI:    Bea Swanson (:  1955) has requested an audio/video evaluation for the following concern(s):    Pt evaluated using virtual visit she is having dyspnea upon exertion for several weeks with some occasional productive cough of purulent sputum. She does not feel she has a covid related infection. Review of Systems   Constitutional: Negative for chills, diaphoresis and fever. HENT: Negative for congestion, sinus pressure and sore throat. Eyes: Negative for visual disturbance. Respiratory: Positive for cough and shortness of breath. Cardiovascular: Negative for chest pain and leg swelling. Gastrointestinal: Negative for diarrhea, nausea and vomiting. Genitourinary: Negative for dysuria, menstrual problem, urgency and vaginal discharge. Musculoskeletal: Negative for arthralgias, back pain and myalgias. Skin: Negative for rash. Neurological: Negative for weakness, numbness and headaches. Psychiatric/Behavioral: Negative for sleep disturbance. Prior to Visit Medications    Medication Sig Taking? Authorizing Provider   azithromycin (ZITHROMAX Z-MEKHI) 250 MG tablet Take 2 pills on day one then one pill daily til gone.  Yes Mil Kaiser MD   albuterol sulfate HFA (VENTOLIN HFA) 108 (90 Base) MCG/ACT inhaler Inhale 2 puffs into the lungs 4 times daily as needed for Wheezing Yes Mil Kaiser MD   montelukast (SINGULAIR) 10 MG tablet Take 1 tablet daily  Keny Poe MD   omeprazole (PRILOSEC) 20 MG delayed release capsule Take one capsule daily  Keny Poe MD   budesonide-formoterol (SYMBICORT) 80-4.5 MCG/ACT AERO INHALE TWO PUFFS BY MOUTH TWICE A DAY  Mil Kaiser MD   clotrimazole-betamethasone (LOTRISONE) 1-0.05 % cream APPLY TO AFFECTED AREA(S) TWO TIMES A DAY  Mil Kaiser MD   loperamide (IMODIUM) 2 MG capsule TAKE ONE CAPSULE BY MOUTH FOUR TIMES A DAY AS NEEDED FOR DIARRHEA  Sde Herberth Kaiser MD   albuterol sulfate HFA (PROVENTIL HFA) 108 (90 Base) MCG/ACT inhaler Inhale 2 puffs into the lungs every 6 hours as needed for Wheezing  Mora Angeles MD       Social History     Tobacco Use    Smoking status: Never Smoker    Smokeless tobacco: Never Used   Substance Use Topics    Alcohol use: No    Drug use: No        Past Medical History:   Diagnosis Date    GERD (gastroesophageal reflux disease)     Hyperlipidemia     RAD (reactive airway disease)    ,   Past Surgical History:   Procedure Laterality Date    COLONOSCOPY  2007; due 2017   ,   Social History     Tobacco Use    Smoking status: Never Smoker    Smokeless tobacco: Never Used   Substance Use Topics    Alcohol use: No    Drug use: No   , No family history on file. PHYSICAL EXAMINATION:  [ INSTRUCTIONS:  \"[x]\" Indicates a positive item  \"[]\" Indicates a negative item  -- DELETE ALL ITEMS NOT EXAMINED]  Vital Signs: (As obtained by patient/caregiver or practitioner observation)    Blood pressure-  Heart rate-    Respiratory rate-    Temperature-  Pulse oximetry-     Constitutional: [x] Appears well-developed and well-nourished [x] No apparent distress      [] Abnormal-   Mental status  [x] Alert and awake  [x] Oriented to person/place/time [x]Able to follow commands      Eyes:  EOM    [x]  Normal  [] Abnormal-  Sclera  [x]  Normal  [] Abnormal -         Discharge []  None visible  [] Abnormal -    HENT:   [x] Normocephalic, atraumatic.   [] Abnormal   [x] Mouth/Throat: Mucous membranes are moist.     External Ears [x] Normal  [] Abnormal-     Neck: [x] No visualized mass     Pulmonary/Chest: [x] Respiratory effort normal.  [x] No visualized signs of difficulty breathing or respiratory distress        [] Abnormal-      Musculoskeletal:   [x] Normal gait with no signs of ataxia         [] Normal range of motion of neck        [] Abnormal-       Neurological:        [x] No Facial Asymmetry (Cranial

## 2020-06-23 ENCOUNTER — TELEPHONE (OUTPATIENT)
Dept: FAMILY MEDICINE CLINIC | Age: 65
End: 2020-06-23

## 2020-07-10 ENCOUNTER — TELEMEDICINE (OUTPATIENT)
Dept: FAMILY MEDICINE CLINIC | Age: 65
End: 2020-07-10
Payer: MEDICARE

## 2020-07-10 PROCEDURE — 1123F ACP DISCUSS/DSCN MKR DOCD: CPT | Performed by: FAMILY MEDICINE

## 2020-07-10 PROCEDURE — 3017F COLORECTAL CA SCREEN DOC REV: CPT | Performed by: FAMILY MEDICINE

## 2020-07-10 PROCEDURE — G8428 CUR MEDS NOT DOCUMENT: HCPCS | Performed by: FAMILY MEDICINE

## 2020-07-10 PROCEDURE — 4040F PNEUMOC VAC/ADMIN/RCVD: CPT | Performed by: FAMILY MEDICINE

## 2020-07-10 PROCEDURE — G8400 PT W/DXA NO RESULTS DOC: HCPCS | Performed by: FAMILY MEDICINE

## 2020-07-10 PROCEDURE — 99213 OFFICE O/P EST LOW 20 MIN: CPT | Performed by: FAMILY MEDICINE

## 2020-07-10 PROCEDURE — 1090F PRES/ABSN URINE INCON ASSESS: CPT | Performed by: FAMILY MEDICINE

## 2020-07-10 RX ORDER — PREDNISONE 20 MG/1
TABLET ORAL
Qty: 20 TABLET | Refills: 0 | Status: SHIPPED | OUTPATIENT
Start: 2020-07-10 | End: 2020-10-26

## 2020-07-10 NOTE — PROGRESS NOTES
7/10/2020    TELEHEALTH EVALUATION -- Audio/Visual (During UZCTE-78 public health emergency)    HPI:    Sumi Gutierrez (:  1955) has requested an audio/video evaluation for the following concern(s):    Dyspnea is worsening with exertion. Rescue inhaler helps a bit and she is compliant    Review of Systems    Prior to Visit Medications    Medication Sig Taking? Authorizing Provider   predniSONE (DELTASONE) 20 MG tablet Take 3 tabs daily for 3 days, then 2 a day for 3 days, then 1 a day for 3 days, then 1/2 a day for 3 days. Yes Dru Celaya MD   azithromycin (ZITHROMAX Z-MEKHI) 250 MG tablet Take 2 pills on day one then one pill daily til gone.   Hilario Ahumada Hillard, MD   albuterol sulfate HFA (VENTOLIN HFA) 108 (90 Base) MCG/ACT inhaler Inhale 2 puffs into the lungs 4 times daily as needed for Wheezing  Mil Kaiser MD   montelukast (SINGULAIR) 10 MG tablet Take 1 tablet daily  Mil Kaiser MD   omeprazole (PRILOSEC) 20 MG delayed release capsule Take one capsule daily  Mil Kaiser MD   budesonide-formoterol (SYMBICORT) 80-4.5 MCG/ACT AERO INHALE TWO PUFFS BY MOUTH TWICE A DAY  Mil Kaiser MD   clotrimazole-betamethasone (LOTRISONE) 1-0.05 % cream APPLY TO AFFECTED AREA(S) TWO TIMES A DAY  Mil Kaiser MD   loperamide (IMODIUM) 2 MG capsule TAKE ONE CAPSULE BY MOUTH FOUR TIMES A DAY AS NEEDED FOR DIARRHEA  Mil Kaiser MD   albuterol sulfate HFA (PROVENTIL HFA) 108 (90 Base) MCG/ACT inhaler Inhale 2 puffs into the lungs every 6 hours as needed for Wheezing  Dru Celaya MD       Social History     Tobacco Use    Smoking status: Never Smoker    Smokeless tobacco: Never Used   Substance Use Topics    Alcohol use: No    Drug use: No        Past Medical History:   Diagnosis Date    GERD (gastroesophageal reflux disease)     Hyperlipidemia     RAD (reactive airway disease)    ,   Past Surgical History:   Procedure Laterality Date    COLONOSCOPY  ; due 2017   ,   Social History     Tobacco Use    Smoking status: Never Smoker    Smokeless tobacco: Never Used   Substance Use Topics    Alcohol use: No    Drug use: No   , No family history on file. PHYSICAL EXAMINATION:  [ INSTRUCTIONS:  \"[x]\" Indicates a positive item  \"[]\" Indicates a negative item  -- DELETE ALL ITEMS NOT EXAMINED]  Vital Signs: (As obtained by patient/caregiver or practitioner observation)    Blood pressure-  Heart rate-    Respiratory rate-    Temperature-  Pulse oximetry-     Constitutional: [x] Appears well-developed and well-nourished [x] No apparent distress      [] Abnormal-   Mental status  [x] Alert and awake  [] Oriented to person/place/time []Able to follow commands      Eyes:  EOM    [x]  Normal  [] Abnormal-  Sclera  [x]  Normal  [] Abnormal -         Discharge []  None visible  [] Abnormal -    HENT:   [x] Normocephalic, atraumatic. [] Abnormal   [] Mouth/Throat: Mucous membranes are moist.     External Ears [x] Normal  [] Abnormal-     Neck: [x] No visualized mass     Pulmonary/Chest: [x] Respiratory effort normal.  [] No visualized signs of difficulty breathing or respiratory distress        [] Abnormal-      Musculoskeletal:   [x] Normal gait with no signs of ataxia         [] Normal range of motion of neck        [] Abnormal-       Neurological:        [] No Facial Asymmetry (Cranial nerve 7 motor function) (limited exam to video visit)          [x] No gaze palsy        [] Abnormal-         Skin:        [] No significant exanthematous lesions or discoloration noted on facial skin         [] Abnormal-            Psychiatric:       [x] Normal Affect [] No Hallucinations        [] Abnormal-     Other pertinent observable physical exam findings-     ASSESSMENT/PLAN:  RAD refractory. No follow-ups on file. Vel Ferrer is a 76 y.o. female being evaluated by a Virtual Visit (video visit) encounter to address concerns as mentioned above. A caregiver was present when appropriate.  Naeem to this being a TeleHealth encounter (During UAXAA-09 public health emergency), evaluation of the following organ systems was limited: Vitals/Constitutional/EENT/Resp/CV/GI//MS/Neuro/Skin/Heme-Lymph-Imm. Pursuant to the emergency declaration under the [address] [place] [address] and the [employer] Act, this Virtual Visit was conducted with patient's (and/or legal guardian's) consent, to reduce the patient's risk of exposure to COVID-19 and provide necessary medical care. The patient (and/or legal guardian) has also been advised to contact this office for worsening conditions or problems, and seek emergency medical treatment and/or call 911 if deemed necessary. Patient identification was verified at the start of the visit: Yes    Total time spent on this encounter: ten minutes. Services were provided through a video synchronous discussion virtually to substitute for in-person clinic visit. Patient and provider were located at their individual homes. Prednisone taper while waiting to get her into Pulmonary medicine. --Adam Dias MD on 7/10/2020 at 2:26 PM    An electronic signature was used to authenticate this note.

## 2020-08-31 ENCOUNTER — OFFICE VISIT (OUTPATIENT)
Dept: FAMILY MEDICINE CLINIC | Age: 65
End: 2020-08-31
Payer: MEDICARE

## 2020-08-31 VITALS
SYSTOLIC BLOOD PRESSURE: 132 MMHG | HEIGHT: 59 IN | OXYGEN SATURATION: 95 % | DIASTOLIC BLOOD PRESSURE: 78 MMHG | BODY MASS INDEX: 25 KG/M2 | WEIGHT: 124 LBS | HEART RATE: 98 BPM | TEMPERATURE: 98.1 F

## 2020-08-31 PROCEDURE — 90694 VACC AIIV4 NO PRSRV 0.5ML IM: CPT | Performed by: FAMILY MEDICINE

## 2020-08-31 PROCEDURE — G0008 ADMIN INFLUENZA VIRUS VAC: HCPCS | Performed by: FAMILY MEDICINE

## 2020-08-31 PROCEDURE — 1123F ACP DISCUSS/DSCN MKR DOCD: CPT | Performed by: FAMILY MEDICINE

## 2020-08-31 PROCEDURE — G0402 INITIAL PREVENTIVE EXAM: HCPCS | Performed by: FAMILY MEDICINE

## 2020-08-31 PROCEDURE — 90732 PPSV23 VACC 2 YRS+ SUBQ/IM: CPT | Performed by: FAMILY MEDICINE

## 2020-08-31 PROCEDURE — G0009 ADMIN PNEUMOCOCCAL VACCINE: HCPCS | Performed by: FAMILY MEDICINE

## 2020-08-31 PROCEDURE — 3017F COLORECTAL CA SCREEN DOC REV: CPT | Performed by: FAMILY MEDICINE

## 2020-08-31 PROCEDURE — 4040F PNEUMOC VAC/ADMIN/RCVD: CPT | Performed by: FAMILY MEDICINE

## 2020-08-31 RX ORDER — BUDESONIDE AND FORMOTEROL FUMARATE DIHYDRATE 160; 4.5 UG/1; UG/1
2 AEROSOL RESPIRATORY (INHALATION) 2 TIMES DAILY
Qty: 3 INHALER | Refills: 3 | Status: SHIPPED | OUTPATIENT
Start: 2020-08-31 | End: 2020-10-26 | Stop reason: SDUPTHER

## 2020-08-31 RX ORDER — PREDNISONE 20 MG/1
TABLET ORAL
Qty: 20 TABLET | Refills: 0 | Status: SHIPPED | OUTPATIENT
Start: 2020-08-31 | End: 2020-10-26

## 2020-08-31 ASSESSMENT — LIFESTYLE VARIABLES: HOW OFTEN DO YOU HAVE A DRINK CONTAINING ALCOHOL: 0

## 2020-08-31 ASSESSMENT — PATIENT HEALTH QUESTIONNAIRE - PHQ9
SUM OF ALL RESPONSES TO PHQ QUESTIONS 1-9: 0
SUM OF ALL RESPONSES TO PHQ QUESTIONS 1-9: 0

## 2020-08-31 NOTE — PROGRESS NOTES
Medicare Annual Wellness Visit  Name: Silas Rios Date: 2020   MRN: [id number] Sex: Female   Age: 76 y.o. Ethnicity: Non-/Non    : 1955 Race: Carlos Card is here for Medicare AWV    Screenings for behavioral, psychosocial and functional/safety risks, and cognitive dysfunction are all negative except as indicated below. These results, as well as other patient data from the [address] form, are documented in Flowsheets linked to this Encounter. Allergies   Allergen Reactions    Sulfamethoxazole-Trimethoprim        Prior to Visit Medications    Medication Sig Taking? Authorizing Provider   budesonide-formoterol (SYMBICORT) 160-4.5 MCG/ACT AERO Inhale 2 puffs into the lungs 2 times daily Yes Mil Kaiser MD   albuterol sulfate HFA (VENTOLIN HFA) 108 (90 Base) MCG/ACT inhaler Inhale 2 puffs into the lungs 4 times daily as needed for Wheezing Yes Mil Kaiser MD   montelukast (SINGULAIR) 10 MG tablet Take 1 tablet daily Yes Yecenia Garcia MD   omeprazole (PRILOSEC) 20 MG delayed release capsule Take one capsule daily Yes Mil Kaiser MD   loperamide (IMODIUM) 2 MG capsule TAKE ONE CAPSULE BY MOUTH FOUR TIMES A DAY AS NEEDED FOR DIARRHEA Yes Mil Kaiser MD   albuterol sulfate HFA (PROVENTIL HFA) 108 (90 Base) MCG/ACT inhaler Inhale 2 puffs into the lungs every 6 hours as needed for Wheezing Yes Mil Kaiser MD   predniSONE (DELTASONE) 20 MG tablet Take 3 tabs daily for 3 days, then 2 a day for 3 days, then 1 a day for 3 days, then 1/2 a day for 3 days.   Patient not taking: Reported on 2020  Yecenia Garcia MD   clotrimazole-betamethasone (Imani Cookey) 1-0.05 % cream APPLY TO AFFECTED AREA(S) TWO TIMES A DAY  Patient not taking: Reported on 2020  Yecenia Garcia MD       Past Medical History:   Diagnosis Date    GERD (gastroesophageal reflux disease)     Hyperlipidemia     RAD (reactive airway disease)        Past Surgical History:   Procedure Laterality Date    COLONOSCOPY  2007; due 2017       No family history on file. CareTeam (Including outside providers/suppliers regularly involved in providing care):   Patient Care Team:  Karen Pham MD as PCP - General (Family Medicine)  Karen Pham MD as PCP - [facility] Empaneled Provider    Wt Readings from Last 3 Encounters:   08/31/20 124 lb (56.2 kg)   02/19/20 124 lb 9.6 oz (56.5 kg)   11/07/19 124 lb 6.4 oz (56.4 kg)     Vitals:    08/31/20 1539   BP: 132/78   Pulse: 98   Temp: 98.1 °F (36.7 °C)   SpO2: 95%   Weight: 124 lb (56.2 kg)   Height: 4' 11\" (1.499 m)     Body mass index is 25.04 kg/m². Based upon direct observation of the patient, evaluation of cognition reveals recent and remote memory intact. General Appearance: alert and oriented to person, place and time  ENT: tympanic membrane, external ear and ear canal normal bilaterally, oropharynx clear and moist with normal mucous membranes  Pulmonary/Chest: clear to auscultation bilaterally- no wheezes, rales or rhonchi, normal air movement, no respiratory distress and no chest wall tenderness  Cardiovascular: normal rate, normal S1 and S2, no gallops, intact distal pulses and no carotid bruits  Abdomen: soft, non-tender and non-distended  Extremities: no cyanosis, no clubbing and no edema    Patient's complete Health Risk Assessment and screening values have been reviewed and are found in Flowsheets. The following problems were reviewed today and where indicated follow up appointments were made and/or referrals ordered. Positive Risk Factor Screenings with Interventions:     General Health:  General  In general, how would you say your health is?: Good  In the past 7 days, have you experienced any of the following?  New or Increased Pain, New or Increased Fatigue, Loneliness, Social Isolation, Stress or Anger?: None of These  Do you get the social and emotional support that you need?: Yes  Do you have a Living Will?: (!) No  General Health Risk Interventions:  · Prednisone taper     Health Habits/Nutrition:  Health Habits/Nutrition  Do you exercise for at least 20 minutes 2-3 times per week?: (!) No  Have you lost any weight without trying in the past 3 months?: No  Do you eat fewer than 2 meals per day?: No  Have you seen a dentist within the past year?: (!) No  Body mass index is 25.04 kg/m². Health Habits/Nutrition Interventions:  · Prednisone taper    Hearing/Vision:  No exam data present  Hearing/Vision  Do you or your family notice any trouble with your hearing?: No  Do you have difficulty driving, watching TV, or doing any of your daily activities because of your eyesight?: No  Have you had an eye exam within the past year?: (!) No  Hearing/Vision Interventions:  · no concerns. Personalized Preventive Plan   Current Health Maintenance Status  Immunization History   Administered Date(s) Administered    Influenza 10/03/2013    Influenza Virus Vaccine 10/30/2014    Influenza, Celestia Louiseon, IM, (6 mo and older Fluzone, Flulaval, Fluarix and 3 yrs and older Afluria) 11/09/2017, 10/14/2019    Pneumococcal Conjugate 13-valent (Marquita Doheny) 10/14/2019    Pneumococcal Polysaccharide ([id number]) 10/03/2013        Health Maintenance   Topic Date Due    Hepatitis C screen  1955    DTaP/Tdap/Td vaccine (1 - Tdap) 01/22/1973    Shingles Vaccine (1 of 2) 01/22/2004    DEXA (modify frequency per FRAX score)  01/22/2009    Colon cancer screen colonoscopy  12/12/2016    Breast cancer screen  06/15/2017    Annual Wellness Visit (AWV)  02/19/2020    Flu vaccine (1) 09/01/2020    Pneumococcal 65+ years Vaccine (2 of 2 - PPSV23) 10/14/2020    Lipid screen  10/19/2024    Hepatitis A vaccine  Aged Out    Hepatitis B vaccine  Aged Out    Hib vaccine  Aged Out    Meningococcal (ACWY) vaccine  Aged Out     Recommendations for [employer] Due: see orders and patient instructions/AVS.  .   Recommended screening schedule for the next 5-10 years is provided to the patient in written form: see Patient Instructions/AVS.    There are no diagnoses linked to this encounter. Flu vaccine and second and final pneumovax given today. We will again attempt RAD remission with prednisone taper and then increase her symbicort from 80 to 160 bid.

## 2020-08-31 NOTE — PROGRESS NOTES
Vaccine Information Sheet, \"Influenza - Inactivated\"  given to Lindsey Pope, or parent/legal guardian of  Lindsey Pope and verbalized understanding. Patient responses:    Have you ever had a reaction to a flu vaccine? No  Are you able to eat eggs without adverse effects? No  Do you have any current illness? No  Have you ever had Guillian [place] Syndrome? No    Flu vaccine given per order. Please see immunization tab.

## 2020-08-31 NOTE — PATIENT INSTRUCTIONS
Personalized Preventive Plan for Mera Gotti - 8/31/2020  Medicare offers a range of preventive health benefits. Some of the tests and screenings are paid in full while other may be subject to a deductible, co-insurance, and/or copay. Some of these benefits include a comprehensive review of your medical history including lifestyle, illnesses that may run in your family, and various assessments and screenings as appropriate. After reviewing your medical record and screening and assessments performed today your provider may have ordered immunizations, labs, imaging, and/or referrals for you. A list of these orders (if applicable) as well as your Preventive Care list are included within your After Visit Summary for your review. Other Preventive Recommendations:    · A preventive eye exam performed by an eye specialist is recommended every 1-2 years to screen for glaucoma; cataracts, macular degeneration, and other eye disorders. · A preventive dental visit is recommended every 6 months. · Try to get at least 150 minutes of exercise per week or 10,000 steps per day on a pedometer . · Order or download the FREE \"Exercise & Physical Activity: Your Everyday Guide\" from [employer]. Call 0-726.447.8689 or search [employer] online. · You need 6988-0585 mg of calcium and 4931-6940 IU of vitamin D per day. It is possible to meet your calcium requirement with diet alone, but a vitamin D supplement is usually necessary to meet this goal.  · When exposed to the sun, use a sunscreen that protects against both UVA and UVB radiation with an SPF of 30 or greater. Reapply every 2 to 3 hours or after sweating, drying off with a towel, or swimming. · Always wear a seat belt when traveling in a car. Always wear a helmet when riding a bicycle or motorcycle.

## 2020-10-06 ENCOUNTER — TELEPHONE (OUTPATIENT)
Dept: FAMILY MEDICINE CLINIC | Age: 65
End: 2020-10-06

## 2020-10-06 RX ORDER — CLOTRIMAZOLE AND BETAMETHASONE DIPROPIONATE 10; .64 MG/G; MG/G
CREAM TOPICAL
Qty: 45 G | Refills: 0 | Status: SHIPPED | OUTPATIENT
Start: 2020-10-06 | End: 2021-01-28

## 2020-10-06 NOTE — TELEPHONE ENCOUNTER
Roxanna on [facility]. Yes she would like to try the Rx. Will this be for both areas or just the legs?

## 2020-10-06 NOTE — TELEPHONE ENCOUNTER
Patient called, she has a rash between her legs now. It was just her underarm area but, now similar rash, itching a lot. She said with the steroid before it helped her underarms but, isn't going away.

## 2020-10-26 ENCOUNTER — TELEPHONE (OUTPATIENT)
Dept: PULMONOLOGY | Age: 65
End: 2020-10-26

## 2020-10-26 ENCOUNTER — OFFICE VISIT (OUTPATIENT)
Dept: PULMONOLOGY | Age: 65
End: 2020-10-26
Payer: MEDICARE

## 2020-10-26 VITALS
SYSTOLIC BLOOD PRESSURE: 202 MMHG | HEIGHT: 59 IN | DIASTOLIC BLOOD PRESSURE: 94 MMHG | TEMPERATURE: 97.7 F | RESPIRATION RATE: 20 BRPM | WEIGHT: 124 LBS | BODY MASS INDEX: 25 KG/M2

## 2020-10-26 PROCEDURE — 1036F TOBACCO NON-USER: CPT | Performed by: INTERNAL MEDICINE

## 2020-10-26 PROCEDURE — G8427 DOCREV CUR MEDS BY ELIG CLIN: HCPCS | Performed by: INTERNAL MEDICINE

## 2020-10-26 PROCEDURE — 99205 OFFICE O/P NEW HI 60 MIN: CPT | Performed by: INTERNAL MEDICINE

## 2020-10-26 PROCEDURE — G8400 PT W/DXA NO RESULTS DOC: HCPCS | Performed by: INTERNAL MEDICINE

## 2020-10-26 PROCEDURE — G8484 FLU IMMUNIZE NO ADMIN: HCPCS | Performed by: INTERNAL MEDICINE

## 2020-10-26 PROCEDURE — 3017F COLORECTAL CA SCREEN DOC REV: CPT | Performed by: INTERNAL MEDICINE

## 2020-10-26 PROCEDURE — G8417 CALC BMI ABV UP PARAM F/U: HCPCS | Performed by: INTERNAL MEDICINE

## 2020-10-26 PROCEDURE — 1123F ACP DISCUSS/DSCN MKR DOCD: CPT | Performed by: INTERNAL MEDICINE

## 2020-10-26 PROCEDURE — 4040F PNEUMOC VAC/ADMIN/RCVD: CPT | Performed by: INTERNAL MEDICINE

## 2020-10-26 PROCEDURE — 1090F PRES/ABSN URINE INCON ASSESS: CPT | Performed by: INTERNAL MEDICINE

## 2020-10-26 RX ORDER — TIOTROPIUM BROMIDE INHALATION SPRAY 1.56 UG/1
2 SPRAY, METERED RESPIRATORY (INHALATION) DAILY
Qty: 1 INHALER | Refills: 11 | Status: SHIPPED | OUTPATIENT
Start: 2020-10-26 | End: 2021-01-28

## 2020-10-26 RX ORDER — BUDESONIDE AND FORMOTEROL FUMARATE DIHYDRATE 160; 4.5 UG/1; UG/1
2 AEROSOL RESPIRATORY (INHALATION) 2 TIMES DAILY
Qty: 3 INHALER | Refills: 3 | Status: SHIPPED | OUTPATIENT
Start: 2020-10-26 | End: 2021-11-04

## 2020-10-26 RX ORDER — TIOTROPIUM BROMIDE INHALATION SPRAY 1.56 UG/1
2 SPRAY, METERED RESPIRATORY (INHALATION) DAILY
Qty: 2 INHALER | Refills: 0 | COMMUNITY
Start: 2020-10-26 | End: 2021-01-28

## 2020-10-26 RX ORDER — MONTELUKAST SODIUM 10 MG/1
10 TABLET ORAL NIGHTLY
Qty: 90 TABLET | Refills: 3 | Status: SHIPPED | OUTPATIENT
Start: 2020-10-26 | End: 2021-10-29

## 2020-10-26 RX ORDER — PREDNISONE 10 MG/1
TABLET ORAL
Qty: 30 TABLET | Refills: 0 | Status: SHIPPED | OUTPATIENT
Start: 2020-10-26 | End: 2021-01-28

## 2020-10-26 RX ORDER — ALBUTEROL SULFATE 90 UG/1
2 AEROSOL, METERED RESPIRATORY (INHALATION) EVERY 6 HOURS PRN
Qty: 3 INHALER | Refills: 3 | Status: SHIPPED | OUTPATIENT
Start: 2020-10-26 | End: 2021-12-06 | Stop reason: SDUPTHER

## 2020-10-26 ASSESSMENT — ENCOUNTER SYMPTOMS
WHEEZING: 1
COUGH: 1
EYES NEGATIVE: 1
CHEST TIGHTNESS: 1
GASTROINTESTINAL NEGATIVE: 1
SHORTNESS OF BREATH: 1

## 2020-10-27 NOTE — PROGRESS NOTES
Subjective:      Patient ID: Elias Bedolla is a 76 y.o. female. HPI  New patient visit, referred by Dr. Quintin Gentile, for evaluation of poorly controlled asthma. She states that after spelunking while vacationing in [facility] [place] she developed symptoms of chest tightness and shortness of breath. States that her symptoms began shortly after she emerged from the cave. She denies previous symptoms although has a extensive history of multiple environmental allergies (everything they tested me for I was positive). Very strong family history of asthma as well. Since then her symptoms have been persistent and difficult to control. She was seen shortly thereafter by Dr. Libia Huang who diagnosed asthma. Initially she was on Symbicort 80/4.5, montelukast, and albuterol. About 2 years ago, Dr. Tiffanei Samayoa, her primary care physician, recommended that she utilize Yi 160/4.5 at bedtime and 80/four-point 5 in the morning. This was primarily because of persistent nocturnal symptoms. Unfortunately her asthma is not better. She reports near daily symptoms. Uses her albuterol inhaler 3-4 times daily. Only transient relief. She has been on prednisone twice this year. She states \"when I am on prednisone everything is fine but shortly after I go off things are the same again. \"  Triggers include exercise, laughter, and cold air. No pets in the home. She believes her symptoms are worse in the spring and fall. She is short of breath with mild to moderate exertion. Has difficulty performing her job as an . Believes that the cotton dust is one of her triggers. Reports nocturnal symptoms. Suffers from severe gastroesophageal reflux although claims that her symptoms are well controlled on Prilosec. No bedtime snacks or water brash. Specifically denies nasal polyposis or aspirin sensitivity. She had a chest x-ray done in May which is reviewed. Heart size is normal.  Lung fields are clear.   Hilar and mediastinal structures are unremarkable. Spirometry done on 6/15/2015 demonstrated a moderate obstructive ventilatory defect. FVC 1.75 L (65%), FEV1 1.07 L (52%), FEV1/FVC 61%. Never smoked. Eosinophils on recent CBC elevated at 7%. Of note blood pressure elevated 3 separate readings this admission. Patient denies the presence of hypertension. \"This is the first time I blood pressure has been elevated. \"    Current Outpatient Medications   Medication Sig Dispense Refill    budesonide-formoterol (SYMBICORT) 160-4.5 MCG/ACT AERO Inhale 2 puffs into the lungs 2 times daily 3 Inhaler 3    montelukast (SINGULAIR) 10 MG tablet Take 1 tablet by mouth nightly 90 tablet 3    albuterol sulfate HFA (PROVENTIL HFA) 108 (90 Base) MCG/ACT inhaler Inhale 2 puffs into the lungs every 6 hours as needed for Wheezing 3 Inhaler 3    tiotropium (SPIRIVA RESPIMAT) 1.25 MCG/ACT AERS inhaler Inhale 2 puffs into the lungs daily 1 Inhaler 11    predniSONE (DELTASONE) 10 MG tablet 4 aqm x3d, [id number],[id number]&off 30 tablet 0    clotrimazole-betamethasone (LOTRISONE) 1-0.05 % cream Apply topically 2 times daily. 45 g 0    omeprazole (PRILOSEC) 20 MG delayed release capsule Take one capsule daily 90 capsule 3    tiotropium (SPIRIVA RESPIMAT) 1.25 MCG/ACT AERS inhaler Inhale 2 puffs into the lungs daily 2 Inhaler 0    clotrimazole-betamethasone (LOTRISONE) 1-0.05 % cream APPLY TO AFFECTED AREA(S) TWO TIMES A DAY (Patient not taking: Reported on 8/31/2020) 1 Tube 2    loperamide (IMODIUM) 2 MG capsule TAKE ONE CAPSULE BY MOUTH FOUR TIMES A DAY AS NEEDED FOR DIARRHEA (Patient not taking: Reported on 10/26/2020) 24 capsule 0     No current facility-administered medications for this visit. History reviewed. No pertinent family history.     Social History     Tobacco Use    Smoking status: Never Smoker    Smokeless tobacco: Never Used   Substance Use Topics    Alcohol use: No    Drug use: No       Review of Systems Constitutional: Negative. HENT: Negative. Eyes: Negative. Respiratory: Positive for cough, chest tightness, shortness of breath and wheezing. Cardiovascular: Negative. Gastrointestinal: Negative. Endocrine: Negative. Genitourinary: Negative. Musculoskeletal: Negative. Skin: Negative. Allergic/Immunologic: Positive for environmental allergies. Neurological: Negative. Hematological: Negative. Psychiatric/Behavioral: Negative. All other systems reviewed and are negative. Objective:     Physical Exam  Vitals signs and nursing note reviewed. Constitutional:       General: She is not in acute distress. Appearance: Normal appearance. She is well-developed. She is not ill-appearing. HENT:      Head: Normocephalic. Right Ear: Tympanic membrane, ear canal and external ear normal.      Left Ear: Tympanic membrane and ear canal normal.      Nose: Nose normal. No congestion. Mouth/Throat:      Mouth: Mucous membranes are moist.      Pharynx: Oropharynx is clear. No oropharyngeal exudate. Eyes:      General: No scleral icterus. Conjunctiva/sclera: Conjunctivae normal.   Neck:      Musculoskeletal: Neck supple. Thyroid: No thyromegaly. Vascular: No JVD. Trachea: No tracheal deviation. Cardiovascular:      Rate and Rhythm: Normal rate and regular rhythm. Pulses: Normal pulses. Heart sounds: Normal heart sounds. No murmur. No gallop. Pulmonary:      Effort: Respiratory distress present. Breath sounds: Wheezing present. No rales. Comments: AP diameter of chest increased. Thoracic expansion and diaphragmatic excursion diminished. BS diminished and expiratory phase prolonged. No dullness to percussion or tenderness to palpation. Chest:      Chest wall: No tenderness. Abdominal:      General: There is no distension. Palpations: Abdomen is soft. There is no mass. Tenderness: There is no abdominal tenderness. Hernia: No hernia is present. Lymphadenopathy:      Cervical: No cervical adenopathy. Skin:     General: Skin is warm and dry. Findings: No lesion or rash. Neurological:      Mental Status: She is alert and oriented to person, place, and time.          Wt Readings from Last 3 Encounters:   10/26/20 124 lb (56.2 kg)   08/31/20 124 lb (56.2 kg)   02/19/20 124 lb 9.6 oz (56.5 kg)       Results for orders placed or performed during the hospital encounter of 05/12/20   Vitamin B12 & Folate   Result Value Ref Range    Vitamin B-12 469 232 - 1245 pg/mL    Folate >20.0 >4.8 ng/mL   Ferritin   Result Value Ref Range    Ferritin 43 13 - 150 ug/L   Iron and TIBC   Result Value Ref Range    Iron 65 37 - 145 ug/dL    TIBC 383 250 - 450 ug/dL    Iron Saturation 17 (L) 20 - 55 %    UIBC 318 112 - 347 ug/dL   Comprehensive Metabolic Panel   Result Value Ref Range    Glucose 78 70 - 99 mg/dL    BUN 13 8 - 23 mg/dL    CREATININE 0.78 0.50 - 0.90 mg/dL    Bun/Cre Ratio NOT REPORTED 9 - 20    Calcium 9.3 8.6 - 10.4 mg/dL    Sodium 143 135 - 144 mmol/L    Potassium 4.4 3.7 - 5.3 mmol/L    Chloride 103 98 - 107 mmol/L    CO2 26 20 - 31 mmol/L    Anion Gap 14 9 - 17 mmol/L    Alkaline Phosphatase 95 35 - 104 U/L    ALT 13 5 - 33 U/L    AST 24 <32 U/L    Total Bilirubin 0.72 0.3 - 1.2 mg/dL    Total Protein 7.6 6.4 - 8.3 g/dL    Alb 4.2 3.5 - 5.2 g/dL    Albumin/Globulin Ratio 1.2 1.0 - 2.5    GFR Non-African American >60 >60 mL/min    GFR African American >60 >60 mL/min    GFR Comment          GFR Staging NOT REPORTED    CBC Auto Differential   Result Value Ref Range    WBC 10.2 3.5 - 11.3 k/uL    RBC 5.35 (H) 3.95 - 5.11 m/uL    Hemoglobin 13.8 11.9 - 15.1 g/dL    Hematocrit 44.3 36.3 - 47.1 %    MCV 82.8 82.6 - 102.9 fL    MCH 25.8 25.2 - 33.5 pg    MCHC 31.2 28.4 - 34.8 g/dL    RDW 13.2 11.8 - 14.4 %    Platelets 982 107 - 324 k/uL    MPV 10.8 8.1 - 13.5 fL    NRBC Automated 0.0 0.0 per 100 WBC    Differential Type NOT REPORTED     Seg Neutrophils 61 36 - 65 %    Lymphocytes 27 24 - 43 %    Monocytes 5 3 - 12 %    Eosinophils % 7 (H) 1 - 4 %    Basophils 0 0 - 2 %    Immature Granulocytes 0 0 %    Segs Absolute 6.20 1.50 - 8.10 k/uL    Absolute Lymph # 2.74 1.10 - 3.70 k/uL    Absolute Mono # 0.49 0.10 - 1.20 k/uL    Absolute Eos # 0.67 (H) 0.00 - 0.44 k/uL    Basophils Absolute 0.03 0.00 - 0.20 k/uL    Absolute Immature Granulocyte <0.03 0.00 - 0.30 k/uL    WBC Morphology NOT REPORTED     RBC Morphology NOT REPORTED     Platelet Estimate NOT REPORTED        Assessment:      1. Severe persistent asthma, poorly-controlled, with acute exacerbation    2. Eosinophilic asthma    3. Multiple environmental allergies    4. Gastroesophageal reflux disease, unspecified whether esophagitis present    5. Elevated blood pressure reading           Plan:      1. Long discussion with patient. Likely T2 high asthma endotype poorly controlled and complicated by COPD. Chronic airways remodeling. Stressed importance of daily anti-inflammatory treatment. 2. Complete pulmonary function studies. 3. Check FENO  4. Escalate asthma treatment. Increase Symbicort 160/4.52 puffs twice daily. 5. Add Spiriva 1.25 mcg 2 puff daily. 6. Montelukast 10 mg nightly. 7. Prednisone standard burst.  8. Discussed in generalities biologic therapy including risks, benefits, and rationale. 9. Serum eosinophils and IgE. 10. Avoid environmental triggers. 11. Follow-up with Dr. Alix Chambers regarding elevated blood pressure. 12. Control symptoms of gastroesophageal reflux. 13. Patient already received her flu shot. 14. Discussed strategies to mitigate risk of COVID-19 infection. 15. Follow-up after above. Thanks for the kind referral.  We will keep you posted as the work-up proceeds. These call with questions or problems.       Electronically signed by Violette Mendez DO on 10/26/2020 at 8:35 PM

## 2020-10-29 ENCOUNTER — HOSPITAL ENCOUNTER (OUTPATIENT)
Age: 65
Discharge: HOME | End: 2020-10-29
Payer: MEDICARE

## 2020-10-29 LAB
EOSINOPHILS ABSOLUTE: 0 /UL (ref 200–400)
EOSINOPHILS RELATIVE PERCENT: ABNORMAL %
IGE: 346 IU/ML
WBC # BLD: ABNORMAL K/UL

## 2020-10-29 PROCEDURE — 82785 ASSAY OF IGE: CPT

## 2020-10-29 PROCEDURE — 85048 AUTOMATED LEUKOCYTE COUNT: CPT

## 2020-10-29 PROCEDURE — 36415 COLL VENOUS BLD VENIPUNCTURE: CPT

## 2020-11-20 RX ORDER — OMEPRAZOLE 20 MG/1
CAPSULE, DELAYED RELEASE ORAL
Qty: 90 CAPSULE | Refills: 2 | Status: SHIPPED | OUTPATIENT
Start: 2020-11-20 | End: 2020-11-25 | Stop reason: SDUPTHER

## 2020-11-20 RX ORDER — BUDESONIDE AND FORMOTEROL FUMARATE DIHYDRATE 80; 4.5 UG/1; UG/1
AEROSOL RESPIRATORY (INHALATION)
Qty: 30.6 G | Refills: 2 | Status: SHIPPED | OUTPATIENT
Start: 2020-11-20 | End: 2021-01-28

## 2020-11-20 NOTE — TELEPHONE ENCOUNTER
Debo Mariscal is calling to request a refill on the following medication(s):    Medication Request:  Requested Prescriptions     Pending Prescriptions Disp Refills    budesonide-formoterol (SYMBICORT) 80-4.5 MCG/ACT AERO [Pharmacy Med Name: Fiona Harp 80-4.5 MCG INHALER] 30.6 g 2     Sig: INHALE TWO PUFFS BY MOUTH TWICE A DAY    omeprazole (PRILOSEC) 20 MG delayed release capsule [Pharmacy Med Name: OMEPRAZOLE DR 20 MG CAPSULE] 90 capsule 2     Sig: TAKE ONE CAPSULE BY MOUTH DAILY       Last Visit Date (If Applicable):  5/08/9622    Next Visit Date:    Visit date not found

## 2020-11-25 RX ORDER — OMEPRAZOLE 20 MG/1
CAPSULE, DELAYED RELEASE ORAL
Qty: 90 CAPSULE | Refills: 2 | Status: SHIPPED | OUTPATIENT
Start: 2020-11-25 | End: 2021-08-10 | Stop reason: SDUPTHER

## 2020-11-25 NOTE — TELEPHONE ENCOUNTER
Blake Rojas is calling to request a refill on the following medication(s):    Medication Request:  Requested Prescriptions     Pending Prescriptions Disp Refills    omeprazole (PRILOSEC) 20 MG delayed release capsule 90 capsule 2     Sig: TAKE ONE CAPSULE BY MOUTH DAILY       Last Visit Date (If Applicable):  0/24/8992    Next Visit Date:    Visit date not found

## 2020-12-06 ENCOUNTER — HOSPITAL ENCOUNTER (OUTPATIENT)
Dept: PREADMISSION TESTING | Age: 65
Setting detail: SPECIMEN
Discharge: HOME | End: 2020-12-10
Payer: MEDICARE

## 2020-12-06 PROCEDURE — U0003 INFECTIOUS AGENT DETECTION BY NUCLEIC ACID (DNA OR RNA); SEVERE ACUTE RESPIRATORY SYNDROME CORONAVIRUS 2 (SARS-COV-2) (CORONAVIRUS DISEASE [COVID-19]), AMPLIFIED PROBE TECHNIQUE, MAKING USE OF HIGH THROUGHPUT TECHNOLOGIES AS DESCRIBED BY CMS-2020-01-R: HCPCS

## 2020-12-07 LAB
SARS-COV-2, RAPID: NORMAL
SARS-COV-2: NORMAL
SARS-COV-2: NOT DETECTED
SOURCE: NORMAL

## 2020-12-10 ENCOUNTER — HOSPITAL ENCOUNTER (OUTPATIENT)
Dept: PULMONOLOGY | Age: 65
Discharge: HOME | End: 2020-12-10
Payer: MEDICARE

## 2020-12-10 PROCEDURE — 94060 EVALUATION OF WHEEZING: CPT

## 2020-12-10 PROCEDURE — 94726 PLETHYSMOGRAPHY LUNG VOLUMES: CPT

## 2020-12-10 PROCEDURE — 94640 AIRWAY INHALATION TREATMENT: CPT

## 2020-12-10 PROCEDURE — 94729 DIFFUSING CAPACITY: CPT

## 2020-12-10 PROCEDURE — 94664 DEMO&/EVAL PT USE INHALER: CPT

## 2020-12-11 NOTE — PROCEDURES
[address] [facility]                               PULMONARY FUNCTION    PATIENT NAME: Olga Zhang                 :        1955  MED REC NO:   [id number]                             ROOM:  ACCOUNT NO:   [de-identified]                           ADMIT DATE: 12/10/2020  PROVIDER:     Moreno Grider    DATE OF PROCEDURE:  12/10/2020    The patient's spirometry shows a severe obstructive defect with a  bronchodilator change. FEV1 is 52% predicted with 26% bronchodilator  change to 66% predicted. FVC is 63% predicted with 14% bronchodilator  change to 72% predicted. Both meeting ATS criteria for bronchodilation. FEV1/FVC ratio is 66. Post bronchodilator, it is 72. Lung volumes by body box are normal and not showing air trapping. Total  lung capacity 90% predicted, % predicted. Diffusion capacity normal, uncorrected at 93% predicted with increase in  airway resistance. FINAL IMPRESSION:  The study shows there is moderate obstructive  ventilatory dysfunction with a moderate bronchodilator response  consistent with asthma. Clinical correlation advised.         Edna Iverson    D: 12/10/2020 12:54:49       T: 12/10/2020 14:09:09     /[email or web address]  Job#: [id number]     Doc#: [id number]    CC:

## 2020-12-15 ENCOUNTER — VIRTUAL VISIT (OUTPATIENT)
Dept: PULMONOLOGY | Age: 65
End: 2020-12-15
Payer: MEDICARE

## 2020-12-15 PROCEDURE — 1036F TOBACCO NON-USER: CPT | Performed by: INTERNAL MEDICINE

## 2020-12-15 PROCEDURE — G8400 PT W/DXA NO RESULTS DOC: HCPCS | Performed by: INTERNAL MEDICINE

## 2020-12-15 PROCEDURE — 4040F PNEUMOC VAC/ADMIN/RCVD: CPT | Performed by: INTERNAL MEDICINE

## 2020-12-15 PROCEDURE — 1123F ACP DISCUSS/DSCN MKR DOCD: CPT | Performed by: INTERNAL MEDICINE

## 2020-12-15 PROCEDURE — 99213 OFFICE O/P EST LOW 20 MIN: CPT | Performed by: INTERNAL MEDICINE

## 2020-12-15 PROCEDURE — G8417 CALC BMI ABV UP PARAM F/U: HCPCS | Performed by: INTERNAL MEDICINE

## 2020-12-15 PROCEDURE — 3017F COLORECTAL CA SCREEN DOC REV: CPT | Performed by: INTERNAL MEDICINE

## 2020-12-15 PROCEDURE — G8427 DOCREV CUR MEDS BY ELIG CLIN: HCPCS | Performed by: INTERNAL MEDICINE

## 2020-12-15 PROCEDURE — 1090F PRES/ABSN URINE INCON ASSESS: CPT | Performed by: INTERNAL MEDICINE

## 2020-12-15 PROCEDURE — G8484 FLU IMMUNIZE NO ADMIN: HCPCS | Performed by: INTERNAL MEDICINE

## 2020-12-15 ASSESSMENT — ENCOUNTER SYMPTOMS
RESPIRATORY NEGATIVE: 1
EYES NEGATIVE: 1
GASTROINTESTINAL NEGATIVE: 1

## 2020-12-15 ASSESSMENT — SLEEP AND FATIGUE QUESTIONNAIRES
HOW LIKELY ARE YOU TO NOD OFF OR FALL ASLEEP WHILE SITTING AND READING: 2
HOW LIKELY ARE YOU TO NOD OFF OR FALL ASLEEP WHILE LYING DOWN TO REST IN THE AFTERNOON WHEN CIRCUMSTANCES PERMIT: 3
ESS TOTAL SCORE: 9
HOW LIKELY ARE YOU TO NOD OFF OR FALL ASLEEP WHEN YOU ARE A PASSENGER IN A CAR FOR AN HOUR WITHOUT A BREAK: 0
HOW LIKELY ARE YOU TO NOD OFF OR FALL ASLEEP WHILE SITTING INACTIVE IN A PUBLIC PLACE: 1
HOW LIKELY ARE YOU TO NOD OFF OR FALL ASLEEP WHILE SITTING QUIETLY AFTER LUNCH WITHOUT ALCOHOL: 0
HOW LIKELY ARE YOU TO NOD OFF OR FALL ASLEEP WHILE WATCHING TV: 3
HOW LIKELY ARE YOU TO NOD OFF OR FALL ASLEEP WHILE SITTING AND TALKING TO SOMEONE: 0
HOW LIKELY ARE YOU TO NOD OFF OR FALL ASLEEP IN A CAR, WHILE STOPPED FOR A FEW MINUTES IN TRAFFIC: 0

## 2020-12-16 NOTE — PROGRESS NOTES
12/15/2020    TELEHEALTH EVALUATION -- Audio/Visual (During PXYBL-45 public health emergency)    Patient and physician are located in their individual locations. This is visit is completed via [employer] application []/ Doxy. me[] / Telephone [x]     HPI:    Nikolay Shane (:  1955) has requested an audio/video evaluation for the following concern(s):    Follow-up visit for asthma. Since her last visit 6 weeks ago asthma is under much better control. In fact, she states that she has not used her albuterol inhaler. Using Symbicort 1604.5, Singulair 10 mg, nightly, and Spiriva 1.25 mcg. She believes that taking Singulair at bedtime has really been helpful. Her IgE G was elevated at 396. Eosinophils were 0. Possibly related to steroid effect. Patient states that she is has multiple environmental allergies. \"The allergist told me there were more things I was not allergic to than I was. \"  Pets, grass, dust, trees, and other environmental triggers. Review of Systems   Constitutional: Negative. HENT: Negative. Eyes: Negative. Respiratory: Negative. Cardiovascular: Negative. Gastrointestinal: Negative. All other systems reviewed and are negative. Prior to Visit Medications    Medication Sig Taking?  Authorizing Provider   omeprazole (PRILOSEC) 20 MG delayed release capsule TAKE ONE CAPSULE BY MOUTH DAILY Yes Saira Cronin, DO   budesonide-formoterol (SYMBICORT) 160-4.5 MCG/ACT AERO Inhale 2 puffs into the lungs 2 times daily Yes Chava Moore, DO   montelukast (SINGULAIR) 10 MG tablet Take 1 tablet by mouth nightly Yes Chava Moore, DO   albuterol sulfate HFA (PROVENTIL HFA) 108 (90 Base) MCG/ACT inhaler Inhale 2 puffs into the lungs every 6 hours as needed for Wheezing Yes Chava Moore, DO   clotrimazole-betamethasone (LOTRISONE) 1-0.05 % cream APPLY TO AFFECTED AREA(S) TWO TIMES A DAY Yes Laci Quintanilla MD budesonide-formoterol (SYMBICORT) 80-4.5 MCG/ACT AERO INHALE TWO PUFFS BY MOUTH TWICE A DAY  Patient not taking: Reported on 12/15/2020  Claudia Fierro MD   tiotropium (SPIRIVA RESPIMAT) 1.25 MCG/ACT AERS inhaler Inhale 2 puffs into the lungs daily  Patient not taking: Reported on 12/15/2020  Madelyn Moore, DO   predniSONE (DELTASONE) 10 MG tablet 4 aqm x3d, [id number],[id number]&off  Patient not taking: Reported on 12/15/2020  Lacho Murphy,    tiotropium (SPIRIVA RESPIMAT) 1.25 MCG/ACT AERS inhaler Inhale 2 puffs into the lungs daily  Patient not taking: Reported on 12/15/2020  Madelyn Moore, DO   clotrimazole-betamethasone (LOTRISONE) 1-0.05 % cream Apply topically 2 times daily. Patient not taking: Reported on 12/15/2020  Claudia Fierro MD   loperamide (IMODIUM) 2 MG capsule TAKE ONE CAPSULE BY MOUTH FOUR TIMES A DAY AS NEEDED FOR DIARRHEA  Patient not taking: Reported on 10/26/2020  Claudia Fierro MD       Social History     Tobacco Use    Smoking status: Never Smoker    Smokeless tobacco: Never Used   Substance Use Topics    Alcohol use: No    Drug use: No            RECORD REVIEW: Previous medical records were reviewed at today's visit. Wt Readings from Last 3 Encounters:   10/26/20 124 lb (56.2 kg)   08/31/20 124 lb (56.2 kg)   02/19/20 124 lb 9.6 oz (56.5 kg)       Results for orders placed or performed during the hospital encounter of 12/06/20   COVID-19    Specimen: Other   Result Value Ref Range    SARS-CoV-2          SARS-CoV-2, Rapid          Source . NASOPHARYNGEAL SWAB     SARS-CoV-2 Not Detected Not Detected       No results found. PHYSICAL EXAMINATION:  Due to this being a TeleHealth encounter, evaluation of the following organ systems is limited: Vitals/Constitutional/EENT/Resp/CV/GI//MS/Neuro/Skin/Heme-Lymph-Imm.  s:       ASSESSMENT:  1. Asthma, well controlled, severe persistent    2.  Multiple environmental allergies 3. Gastroesophageal reflux disease, unspecified whether esophagitis present          Plan:   1. Continue current asthma medication. We will hold biologic therapy in reserve should asthma become difficult to resolve  2. She already received her flu shot. 3. Discussed strategies to mitigate risk of COVID-19 infection. 4. Return in 6 months. An  electronic signature was used to authenticate this note. --Srinivas Boyer, DO on 12/15/2020 at 10:56 PM    9}    Pursuant to the emergency declaration under the [address] [place] [address] waiver authority and the [employer] Act, this Virtual  Visit was conducted, with patient's consent, to reduce the patient's risk of exposure to COVID-19 and provide continuity of care for an established patient. Services were provided through a video synchronous discussion virtually to substitute for in-person clinic visit.     _______________________________________________________________________________________________________________________________________________  FOR TELEPHONE VISITS PLEASE COMPLETE THE FOLLOWING      Consent:  She and/or health care decision maker is aware that that she may receive a bill for this telephone service, depending on her insurance coverage, and has provided verbal consent to proceed: Yes      I affirm this is a Patient Initiated Episode with an Established Patient who has not had a related appointment within my department in the past 7 days or scheduled within the next 24 hours.     Total Time: minutes: 11-20 minutes    Note: not billable if this call serves to triage the patient into an appointment for the relevant concern

## 2021-01-17 ENCOUNTER — HOSPITAL ENCOUNTER (EMERGENCY)
Age: 66
Discharge: HOME | End: 2021-01-17
Attending: EMERGENCY MEDICINE
Payer: MEDICARE

## 2021-01-17 ENCOUNTER — NURSE TRIAGE (OUTPATIENT)
Dept: OTHER | Age: 66
End: 2021-01-17

## 2021-01-17 VITALS
RESPIRATION RATE: 18 BRPM | WEIGHT: 124 LBS | OXYGEN SATURATION: 97 % | TEMPERATURE: 97.2 F | SYSTOLIC BLOOD PRESSURE: 158 MMHG | DIASTOLIC BLOOD PRESSURE: 82 MMHG | BODY MASS INDEX: 25.04 KG/M2 | HEART RATE: 76 BPM

## 2021-01-17 DIAGNOSIS — I16.0 HYPERTENSIVE URGENCY: Primary | ICD-10-CM

## 2021-01-17 PROBLEM — I10 ESSENTIAL HYPERTENSION: Status: ACTIVE | Noted: 2021-01-17

## 2021-01-17 LAB
ABSOLUTE EOS #: 0.62 K/UL (ref 0–0.44)
ABSOLUTE IMMATURE GRANULOCYTE: 0.04 K/UL (ref 0–0.3)
ABSOLUTE LYMPH #: 1.77 K/UL (ref 1.1–3.7)
ABSOLUTE MONO #: 0.58 K/UL (ref 0.1–1.2)
ANION GAP SERPL CALCULATED.3IONS-SCNC: 11 MMOL/L (ref 9–17)
BASOPHILS # BLD: 0 % (ref 0–2)
BASOPHILS ABSOLUTE: 0.03 K/UL (ref 0–0.2)
BILIRUBIN URINE: NEGATIVE
BUN BLDV-MCNC: 12 MG/DL (ref 8–23)
BUN/CREAT BLD: NORMAL (ref 9–20)
CALCIUM SERPL-MCNC: 9.2 MG/DL (ref 8.6–10.4)
CHLORIDE BLD-SCNC: 102 MMOL/L (ref 98–107)
CO2: 26 MMOL/L (ref 20–31)
COLOR: YELLOW
COMMENT UA: NORMAL
CREAT SERPL-MCNC: 0.75 MG/DL (ref 0.5–0.9)
DIFFERENTIAL TYPE: ABNORMAL
EOSINOPHILS RELATIVE PERCENT: 7 % (ref 1–4)
GFR AFRICAN AMERICAN: >60 ML/MIN
GFR NON-AFRICAN AMERICAN: >60 ML/MIN
GFR SERPL CREATININE-BSD FRML MDRD: NORMAL ML/MIN/{1.73_M2}
GFR SERPL CREATININE-BSD FRML MDRD: NORMAL ML/MIN/{1.73_M2}
GLUCOSE BLD-MCNC: 99 MG/DL (ref 70–99)
GLUCOSE URINE: NEGATIVE
HCT VFR BLD CALC: 41.2 % (ref 36.3–47.1)
HEMOGLOBIN: 12.5 G/DL (ref 11.9–15.1)
IMMATURE GRANULOCYTES: 0 %
KETONES, URINE: NEGATIVE
LEUKOCYTE ESTERASE, URINE: NEGATIVE
LYMPHOCYTES # BLD: 20 % (ref 24–43)
MCH RBC QN AUTO: 25.3 PG (ref 25.2–33.5)
MCHC RBC AUTO-ENTMCNC: 30.3 G/DL (ref 28.4–34.8)
MCV RBC AUTO: 83.4 FL (ref 82.6–102.9)
MONOCYTES # BLD: 7 % (ref 3–12)
NITRITE, URINE: NEGATIVE
NRBC AUTOMATED: 0 PER 100 WBC
PDW BLD-RTO: 13.1 % (ref 11.8–14.4)
PH UA: 8 (ref 5–8)
PLATELET # BLD: 288 K/UL (ref 138–453)
PLATELET ESTIMATE: ABNORMAL
PMV BLD AUTO: 10.1 FL (ref 8.1–13.5)
POTASSIUM SERPL-SCNC: 4.2 MMOL/L (ref 3.7–5.3)
PROTEIN UA: NEGATIVE
RBC # BLD: 4.94 M/UL (ref 3.95–5.11)
RBC # BLD: ABNORMAL 10*6/UL
SEG NEUTROPHILS: 66 % (ref 36–65)
SEGMENTED NEUTROPHILS ABSOLUTE COUNT: 5.86 K/UL (ref 1.5–8.1)
SODIUM BLD-SCNC: 139 MMOL/L (ref 135–144)
SPECIFIC GRAVITY UA: 1.01 (ref 1–1.03)
TURBIDITY: CLEAR
URINE HGB: NEGATIVE
UROBILINOGEN, URINE: NORMAL
WBC # BLD: 8.9 K/UL (ref 3.5–11.3)
WBC # BLD: ABNORMAL 10*3/UL

## 2021-01-17 PROCEDURE — 6370000000 HC RX 637 (ALT 250 FOR IP): Performed by: STUDENT IN AN ORGANIZED HEALTH CARE EDUCATION/TRAINING PROGRAM

## 2021-01-17 PROCEDURE — 81003 URINALYSIS AUTO W/O SCOPE: CPT

## 2021-01-17 PROCEDURE — 85025 COMPLETE CBC W/AUTO DIFF WBC: CPT

## 2021-01-17 PROCEDURE — 80048 BASIC METABOLIC PNL TOTAL CA: CPT

## 2021-01-17 PROCEDURE — 99282 EMERGENCY DEPT VISIT SF MDM: CPT

## 2021-01-17 RX ORDER — METOPROLOL TARTRATE 50 MG/1
25 TABLET, FILM COATED ORAL ONCE
Status: COMPLETED | OUTPATIENT
Start: 2021-01-17 | End: 2021-01-17

## 2021-01-17 RX ORDER — METOPROLOL TARTRATE 50 MG/1
25 TABLET, FILM COATED ORAL 2 TIMES DAILY
Qty: 60 TABLET | Refills: 2 | Status: SHIPPED | OUTPATIENT
Start: 2021-01-17 | End: 2021-01-28 | Stop reason: SDUPTHER

## 2021-01-17 RX ADMIN — METOPROLOL TARTRATE 25 MG: 50 TABLET, FILM COATED ORAL at 15:05

## 2021-01-17 ASSESSMENT — ENCOUNTER SYMPTOMS
SINUS PRESSURE: 0
SHORTNESS OF BREATH: 0
CHEST TIGHTNESS: 0
ABDOMINAL PAIN: 0
VOMITING: 0
EYE PAIN: 0
NAUSEA: 0
SINUS PAIN: 0
FACIAL SWELLING: 0
PHOTOPHOBIA: 0

## 2021-01-17 NOTE — ED PROVIDER NOTES
[facility]  ED     Emergency Department     Faculty Attestation        I performed a history and physical examination of the patient and discussed management with the resident. I reviewed the residents note and agree with the documented findings and plan of care. Any areas of disagreement are noted on the chart. I was personally present for the key portions of any procedures. I have documented in the chart those procedures where I was not present during the key portions. I have reviewed the emergency nurses triage note. I agree with the chief complaint, past medical history, past surgical history, allergies, medications, social and family history as documented unless otherwise noted below. For Physician Assistant/ Nurse Practitioner cases/documentation I have personally evaluated this patient and have completed at least one if not all key elements of the E/M (history, physical exam, and MDM). Additional findings are as noted. Vital Signs: BP: (!) 191/101  Pulse: 91  Resp: 18  Temp: 97.2 °F (36.2 °C) SpO2: 97 %  PCP:  Rigo Broussard MD    Pertinent Comments:     Patient is a 69-year-old female with history of slowly increasing high blood pressure over the last few months. Was 606 systolic and had a left-sided headache and was asked to come in by her PCP. Patient arrives here and she is 757-538 on systolic blood pressure and headache has resolved. Never had chest pain or shortness of breath or any back pain or abdominal pain associated either. No abnormal swelling other. Physical Examination:  Clear to auscultation bilaterally, regular rate and rhythm, and   abdomen soft/nontender/nondistended/normal bowel sounds/no pulsatile masses palpated. Neuro examination:  CN II-XII intact, Bilateral Upper and Lower extremities with 5/5 strength both proximally and distally, and intact sensation to light touch. Downgoing Babinski's Bilaterally. Finger to nose intact with no pronator drift. PERRL at 3 mm bilaterally without nystagmus. DTR's are 2+ bilaterally. Assessment/Plan:   Given patient had a pressure almost 471 systolic and had a headache at the time will begin with metoprolol 25 mg by mouth. Will check CBC and BMP as well as UA. Reevaluate after but likely discharge on prescription and close follow-up with her doctor for possible change in dosing or medication. Critical Care  None    This patient was evaluated in the Emergency Department for symptoms described in the history of present illness. He/she was evaluated in the context of the global COVID-19 pandemic, which necessitated consideration that the patient might be at risk for infection with the SARS-CoV-2 virus that causes COVID-19. Institutional protocols and algorithms that pertain to the evaluation of patients at risk for COVID-19 are in a state of rapid change based on information released by regulatory bodies including the CDC and federal and state organizations. These policies and algorithms were followed during the patient's care in the ED. (Please note that portions of this note were completed with a voice recognition program. Efforts were made to edit the dictations but occasionally words are mis-transcribed.  Whenever words are used in this note in any gender, they shall be construed as though they were used in the gender appropriate to the circumstances; and whenever words are used in this note in the singular or plural form, they shall be construed as though they were used in the form appropriate to the circumstances.)    Collette Jakob, MD [facility]  Attending Emergency Medicine Physician           Sandee Billings MD  01/17/21 1689

## 2021-01-17 NOTE — FLOWSHEET NOTE
Pt to ED cc HTN and sent by PCP  Pt states that she took her pressure at home and it was 200s  Pt denies having any CP/SOB/HA/dizziness  Pt denies having any cardiac hx  Pt placed on continuous cardiac monitoring, BP, Pulse ox. EPt alert and oriented x4, talking in complete sentences, respirations even and unlabored. Pt acting age appropriate.  White board updated,

## 2021-01-17 NOTE — ED PROVIDER NOTES
[facility] ED  Emergency Department Encounter  Emergency Medicine Resident     Pt Name: Rajan Walsh  MRN: [id number]  [place] 1955  Date of evaluation: 1/17/21  PCP:  Kris Boss MD    [address] [place] [address]       Chief Complaint   Patient presents with    Hypertension       HISTORY OFPRESENT ILLNESS  (Location/Symptom, Timing/Onset, Context/Setting, Quality, Duration, Modifying Factors,Severity.)      Rajan Walsh is a 76 y. o.yo female who presents for elevated blood pressure. The patient has been experiencing intermittently elevated BP over the past 2 months. She was instructed by her physician to get a blood pressure cuff and monitor her BP at home. Today she experienced a mild headache and decided to check her blood pressure, it was found to be 199/100. She called her PCP's office and was instructed to go to the ED for acute hypertension management. The patient reports that her headache has resolved without medication. She denies visual changes, chest pain, SOB, abdominal pain, numbness, tingling or weakness. PAST MEDICAL / SURGICAL / SOCIAL / FAMILY HISTORY      has a past medical history of GERD (gastroesophageal reflux disease), Hyperlipidemia, and RAD (reactive airway disease). has a past surgical history that includes Colonoscopy (2007; due 2017).      Social History     Socioeconomic History    Marital status:      Spouse name: Not on file    Number of children: Not on file    Years of education: Not on file    Highest education level: Not on file   Occupational History    Not on file   Social Needs    Financial resource strain: Not on file    Food insecurity     Worry: Not on file     Inability: Not on file    Transportation needs     Medical: Not on file     Non-medical: Not on file   Tobacco Use    Smoking status: Never Smoker    Smokeless tobacco: Never Used   Substance and Sexual Activity    Alcohol use: No    Drug use: No    Sexual (SPIRIVA RESPIMAT) 1.25 MCG/ACT AERS inhaler Inhale 2 puffs into the lungs daily  Patient not taking: Reported on 12/15/2020 10/26/20   Elinore Kehr Tita, DO   clotrimazole-betamethasone (LOTRISONE) 1-0.05 % cream Apply topically 2 times daily. Patient not taking: Reported on 12/15/2020 10/6/20   Mckenna Ham MD   clotrimazole-betamethasone (LOTRISONE) 1-0.05 % cream APPLY TO AFFECTED AREA(S) TWO TIMES A DAY 10/14/19   Mil Kaiser MD   loperamide (IMODIUM) 2 MG capsule TAKE ONE CAPSULE BY MOUTH FOUR TIMES A DAY AS NEEDED FOR DIARRHEA  Patient not taking: Reported on 10/26/2020 2/7/19   Mckenna Ham MD       REVIEW OFSYSTEMS    (2-9 systems for level 4, 10 or more for level 5)      Review of Systems   Constitutional: Negative for chills, fatigue and fever. HENT: Negative for congestion, facial swelling, hearing loss, sinus pressure and sinus pain. Eyes: Negative for photophobia, pain and visual disturbance. Respiratory: Negative for chest tightness and shortness of breath. Cardiovascular: Negative for chest pain, palpitations and leg swelling. Gastrointestinal: Negative for abdominal pain, nausea and vomiting. Genitourinary: Negative for dysuria, flank pain and urgency. Musculoskeletal: Negative for gait problem, neck pain and neck stiffness. Neurological: Negative for dizziness, facial asymmetry, light-headedness, numbness and headaches. Psychiatric/Behavioral: Negative. PHYSICAL EXAM   (up to 7 for level 4, 8 or more forlevel 5)      INITIAL VITALS:   ED Triage Vitals [01/17/21 1407]   BP Temp Temp src Pulse Resp SpO2 Height Weight   -- 97.2 °F (36.2 °C) -- -- -- -- -- --     Vitals:    01/17/21 1407 01/17/21 1416 01/17/21 1505   BP:  (!) 191/101 (!) 158/82   Pulse:  91 76   Resp:  18    Temp: 97.2 °F (36.2 °C)     TempSrc:  Oral    SpO2:  97%    Weight:  124 lb (56.2 kg)          Physical Exam  Constitutional:       General: She is not in acute distress.      Appearance: Normal appearance. She is normal weight. She is not diaphoretic. HENT:      Head: Normocephalic and atraumatic. Nose: Nose normal.      Mouth/Throat:      Mouth: Mucous membranes are moist.      Pharynx: Oropharynx is clear. No oropharyngeal exudate or posterior oropharyngeal erythema. Eyes:      Extraocular Movements: Extraocular movements intact. Conjunctiva/sclera: Conjunctivae normal.      Pupils: Pupils are equal, round, and reactive to light. Neck:      Musculoskeletal: Normal range of motion and neck supple. Vascular: No carotid bruit. Cardiovascular:      Rate and Rhythm: Normal rate and regular rhythm. Pulses: Normal pulses. Heart sounds: No murmur. No gallop. Pulmonary:      Effort: Pulmonary effort is normal.      Breath sounds: No wheezing, rhonchi or rales. Abdominal:      General: Bowel sounds are normal. There is no distension. Palpations: Abdomen is soft. Tenderness: There is no abdominal tenderness. There is no guarding or rebound. Musculoskeletal: Normal range of motion. General: No swelling. Right lower leg: No edema. Left lower leg: No edema. Skin:     General: Skin is warm and dry. Findings: No erythema. Neurological:      General: No focal deficit present. Mental Status: She is alert and oriented to person, place, and time. Cranial Nerves: No cranial nerve deficit. Sensory: No sensory deficit. Motor: No weakness.       Coordination: Coordination normal.   Psychiatric:         Mood and Affect: Mood normal.         Behavior: Behavior normal.         DIFFERENTIAL  DIAGNOSIS     PLAN (LABS / IMAGING / EKG):  Orders Placed This Encounter   Procedures    CBC Auto Differential    Basic Metabolic Panel w/ Reflex to MG    Urinalysis Reflex to Culture       MEDICATIONS ORDERED:  Orders Placed This Encounter   Medications    metoprolol tartrate (LOPRESSOR) tablet 25 mg       DDX: essential hypertension, hypertensive urgency, hypertension secondary to medications, renal dysfunction, possible thyroid or adrenal dysfunction    Initial MDM/Plan: 76 y.o. female who presents with elevated BP. She has had increasingly elevated BP over the past 2 months but has not been started on medications. On exam patient is stable, alert and oriented. She denies complaints concerning for end organ dysfunction. Initial workup to include CBC, BMP, urinalysis. Will give metoprolol 25mg in ED and plan to send patient home with Rx metoprolol. DIAGNOSTIC RESULTS / EMERGENCYDEPARTMENT COURSE / MDM     LABS:  Labs Reviewed   CBC WITH AUTO DIFFERENTIAL - Abnormal; Notable for the following components:       Result Value    Seg Neutrophils 66 (*)     Lymphocytes 20 (*)     Eosinophils % 7 (*)     Absolute Eos # 0.62 (*)     All other components within normal limits   BASIC METABOLIC PANEL W/ REFLEX TO MG FOR LOW K   URINE RT REFLEX TO CULTURE         RADIOLOGY:  No results found. EMERGENCY DEPARTMENT COURSE:    Patient presented from home with elevated BP. Patient was stable on initial evaluation without signs of end organ dysfunction. BP was 191/101. Repeat BP 140s/100s. Patient was given metoprolol 25mg PO one time dose in the ED. Labs were unremarkable. Patient was given Rx metoprolol 25mg PO BID. She was instructed to call her physician's office today to obtain follow up appointment. Discharge instructions given including return precautions. All questions were answered, patient was discharged in stable condition. PROCEDURES:  None    CONSULTS:  None    CRITICAL CARE:  Please see attending note    FINAL IMPRESSION      1. Hypertensive urgency          DISPOSITION / PLAN     DISPOSITION        PATIENT REFERRED TO:  No follow-up provider specified.     DISCHARGE MEDICATIONS:  New Prescriptions    No medications on file       Qian Olmstead DO  PGY-1  Emergency Medicine Service  [facility] Fouzia Sosa DO  Resident  01/17/21 7350

## 2021-01-17 NOTE — TELEPHONE ENCOUNTER
Patient calls and reports that she is having high blood pressure right now of 199/100 and also has a headache. Patient reports no chest pain, no shortness of breath, no numbness, no tingling, no blurry vision. Patient states that she does not take blood pressure medications. Patient states that she is able to walk. Patient informed to go to ER now and have another adult drive her there. Patient agreeable and states that she does have someone to drive her there. Patient states that she will go to [facility] ER.     Reason for Disposition   [9] Systolic BP  >= 502 OR Diastolic >= 111 AND [2] cardiac or neurologic symptoms (e.g., chest pain, difficulty breathing, unsteady gait, blurred vision)    Protocols used: HIGH BLOOD PRESSURE-ADULT-AH

## 2021-01-18 ENCOUNTER — NURSE TRIAGE (OUTPATIENT)
Dept: OTHER | Facility: CLINIC | Age: 66
End: 2021-01-18

## 2021-01-18 NOTE — TELEPHONE ENCOUNTER
Patient seen in ED yesterday. No new or worsening s/s. Just needed ED follow up appt.      Reason for Disposition   Caller has already spoken with another triager and has no further questions    Protocols used: NO CONTACT OR DUPLICATE CONTACT CALL-ADULT-OH

## 2021-01-28 ENCOUNTER — OFFICE VISIT (OUTPATIENT)
Dept: FAMILY MEDICINE CLINIC | Age: 66
End: 2021-01-28
Payer: MEDICARE

## 2021-01-28 VITALS
BODY MASS INDEX: 25.69 KG/M2 | OXYGEN SATURATION: 98 % | HEART RATE: 97 BPM | TEMPERATURE: 97.2 F | DIASTOLIC BLOOD PRESSURE: 76 MMHG | SYSTOLIC BLOOD PRESSURE: 138 MMHG | WEIGHT: 127.2 LBS

## 2021-01-28 DIAGNOSIS — Z13.220 SCREENING CHOLESTEROL LEVEL: ICD-10-CM

## 2021-01-28 DIAGNOSIS — Z12.11 COLON CANCER SCREENING: ICD-10-CM

## 2021-01-28 DIAGNOSIS — I10 ESSENTIAL HYPERTENSION: Primary | ICD-10-CM

## 2021-01-28 DIAGNOSIS — Z12.31 ENCOUNTER FOR SCREENING MAMMOGRAM FOR MALIGNANT NEOPLASM OF BREAST: ICD-10-CM

## 2021-01-28 DIAGNOSIS — R23.8 SKIN SENSITIVITY: ICD-10-CM

## 2021-01-28 DIAGNOSIS — K21.9 GASTROESOPHAGEAL REFLUX DISEASE WITHOUT ESOPHAGITIS: Chronic | ICD-10-CM

## 2021-01-28 DIAGNOSIS — Z13.29 THYROID DISORDER SCREEN: ICD-10-CM

## 2021-01-28 DIAGNOSIS — Z00.00 ENCOUNTER FOR MEDICAL EXAMINATION TO ESTABLISH CARE: ICD-10-CM

## 2021-01-28 DIAGNOSIS — J45.40 MODERATE PERSISTENT ASTHMA WITHOUT COMPLICATION: Chronic | ICD-10-CM

## 2021-01-28 DIAGNOSIS — Z78.0 POST-MENOPAUSAL: ICD-10-CM

## 2021-01-28 DIAGNOSIS — Z11.59 ENCOUNTER FOR HEPATITIS C SCREENING TEST FOR LOW RISK PATIENT: ICD-10-CM

## 2021-01-28 PROCEDURE — 4040F PNEUMOC VAC/ADMIN/RCVD: CPT | Performed by: NURSE PRACTITIONER

## 2021-01-28 PROCEDURE — 1090F PRES/ABSN URINE INCON ASSESS: CPT | Performed by: NURSE PRACTITIONER

## 2021-01-28 PROCEDURE — 3017F COLORECTAL CA SCREEN DOC REV: CPT | Performed by: NURSE PRACTITIONER

## 2021-01-28 PROCEDURE — G8484 FLU IMMUNIZE NO ADMIN: HCPCS | Performed by: NURSE PRACTITIONER

## 2021-01-28 PROCEDURE — G8400 PT W/DXA NO RESULTS DOC: HCPCS | Performed by: NURSE PRACTITIONER

## 2021-01-28 PROCEDURE — G8427 DOCREV CUR MEDS BY ELIG CLIN: HCPCS | Performed by: NURSE PRACTITIONER

## 2021-01-28 PROCEDURE — 1123F ACP DISCUSS/DSCN MKR DOCD: CPT | Performed by: NURSE PRACTITIONER

## 2021-01-28 PROCEDURE — G8417 CALC BMI ABV UP PARAM F/U: HCPCS | Performed by: NURSE PRACTITIONER

## 2021-01-28 PROCEDURE — 99214 OFFICE O/P EST MOD 30 MIN: CPT | Performed by: NURSE PRACTITIONER

## 2021-01-28 PROCEDURE — 1036F TOBACCO NON-USER: CPT | Performed by: NURSE PRACTITIONER

## 2021-01-28 RX ORDER — METOPROLOL SUCCINATE 50 MG/1
TABLET, EXTENDED RELEASE ORAL
Qty: 30 TABLET | Refills: 5 | Status: SHIPPED | OUTPATIENT
Start: 2021-01-28 | Stop reason: SDUPTHER

## 2021-01-28 RX ORDER — CLOTRIMAZOLE AND BETAMETHASONE DIPROPIONATE 10; .64 MG/G; MG/G
CREAM TOPICAL
Qty: 1 TUBE | Refills: 2 | Status: SHIPPED | OUTPATIENT
Start: 2021-01-28 | End: 2021-08-10 | Stop reason: SDUPTHER

## 2021-01-28 RX ORDER — METOPROLOL TARTRATE 50 MG/1
TABLET, FILM COATED ORAL
Qty: 60 TABLET | Refills: 2 | Status: SHIPPED
Start: 2021-01-28 | End: 2021-01-28 | Stop reason: CLARIF

## 2021-01-28 SDOH — ECONOMIC STABILITY: FOOD INSECURITY: WITHIN THE PAST 12 MONTHS, YOU WORRIED THAT YOUR FOOD WOULD RUN OUT BEFORE YOU GOT MONEY TO BUY MORE.: NEVER TRUE

## 2021-01-28 SDOH — ECONOMIC STABILITY: FOOD INSECURITY: WITHIN THE PAST 12 MONTHS, THE FOOD YOU BOUGHT JUST DIDN'T LAST AND YOU DIDN'T HAVE MONEY TO GET MORE.: NEVER TRUE

## 2021-01-28 SDOH — ECONOMIC STABILITY: INCOME INSECURITY: HOW HARD IS IT FOR YOU TO PAY FOR THE VERY BASICS LIKE FOOD, HOUSING, MEDICAL CARE, AND HEATING?: NOT HARD AT ALL

## 2021-01-28 ASSESSMENT — PATIENT HEALTH QUESTIONNAIRE - PHQ9
2. FEELING DOWN, DEPRESSED OR HOPELESS: 0
SUM OF ALL RESPONSES TO PHQ QUESTIONS 1-9: 0
1. LITTLE INTEREST OR PLEASURE IN DOING THINGS: 0
SUM OF ALL RESPONSES TO PHQ QUESTIONS 1-9: 0
SUM OF ALL RESPONSES TO PHQ9 QUESTIONS 1 & 2: 0

## 2021-01-28 NOTE — PROGRESS NOTES
Charis Michel, FNP-C  [address]  [id number] [facility] [address] [facility]  [address]  [phone]  [phone]    Rima Pereyra is a 78 y.o. female who is here with c/o of:    Chief Complaint: [address] (er follow up for high blood pressure)      Patient Accompanied by: n/a    HPI - Rima Pereyra is here today to establish care and for ED f/u for high b/p    HTN   - she has no previous known htn   - she is adherent to low sodium diet, does not eat meat, does not exercise regularly   - she denies h/a, c/p, palpitations, sob, lower extremity edema   - current med: metoprolol 25mg bid and denies any adverse affects    Asthma   - under the care of pulmonology - Dr. Yelitza Ryan   - reports using albuterol inhaler 3 times per week   - current meds: symbicort 2 puff bid, singulair 10mg daily     Axillary pruritis   - this is a problem of a few years   - she reports intermittent redness, itching and burning    - she has been prescribed an unknown cream for this in the past and this cleared her symptoms    Patient Active Problem List:     GERD (gastroesophageal reflux disease)     Elevated lipids     Asthma     Hypertensive urgency     Essential hypertension     Past Medical History:   Diagnosis Date    Essential hypertension 1/17/2021    GERD (gastroesophageal reflux disease)     Hyperlipidemia     RAD (reactive airway disease)       Past Surgical History:   Procedure Laterality Date    COLONOSCOPY  2007; due 2017     No family history on file. Social History     Tobacco Use    Smoking status: Never Smoker    Smokeless tobacco: Never Used   Substance Use Topics    Alcohol use: No     ALLERGIES:    Allergies   Allergen Reactions    Sulfamethoxazole-Trimethoprim           Subjective     · Constitutional:  Negative for activity change, appetite change,unexpected weight change, chills, fever, and fatigue.     · HENT: Negative for ear pain, sore throat,  Rhinorrhea, sinus pain, sinus pressure, congestion. · Eyes:  Negative for pain and discharge. · Respiratory:  Negative for chest tightness, shortness of breath, wheezing, and cough. · Cardiovascular:  Negative for chest pain, palpitations and leg swelling. · Gastrointestinal: Negative for abdominal pain, blood in stool, constipation,diarrhea, nausea and vomiting. · Endocrine: Negative for cold intolerance, heat intolerance, polydipsia, polyphagia and polyuria. · Genitourinary: Negative for difficulty urinating, dysuria, flank pain, frequency, hematuria and urgency. · Musculoskeletal: Negative for arthralgias, back pain, joint swelling, myalgias, neck pain and neck stiffness. · Skin: Negative for rash and wound. Positive for axillary pruritis  · Allergic/Immunologic: Negative for environmental allergies and food allergies. · Neurological:  Negative for dizziness, light-headedness, numbness and headaches. · Hematological:  Negative for adenopathy. Does not bruise/bleed easily. · Psychiatric/Behavioral: Negative for self-injury, sleep disturbance and suicidal ideas. Objective     PHYSICAL EXAM:   · Constitutional: Eligio Bal is oriented to person, place, and time. Vital signs are normal. Appears well-developed and well-nourished. · HEENT:   · Head: Normocephalic and atraumatic. Right Ear: Hearing and external ear normal. TM normal  Canal normal  · Left Ear: Hearing and external ear normal. TM normal Canal normal  · Nose: Nares normal. Septum midline. No drainage or sinus tenderness. Mucosa pink and moist  · Mouth/Throat: Oropharynx- No erythema, no exudate. Uvula midline, no erythema, no edema. Mucous membranes are pink and moist.   · Eyes:PERRL, EOMI, Conjunctiva normal, No discharge. · Neck: Full passive range of motion. Non-tender on palpation. Neck supple. No thyromegaly present. Trachea normal.  · Cardiovascular: Normal rate, regular rhythm, S1, S2, no murmur, no gallop, no friction rub, intact distal pulses. · Pulmonary/Chest: Breath sounds are clear throughout, No respiratory distress, No wheezing, No chest tenderness. Effort normal  · Abdominal: Soft. Normal appearance, bowel sounds are present and normoactive. There is no hepatosplenomegaly. There is no tenderness. There is no CVA tenderness. · Musculoskeletal: Extremities appear regular and symmetric. No evident masses, lesions, foreign bodies, or other abnormalities. No edema. No tenderness on palpation. Joints are stable. Full ROM, strength and tone are within normal limits. · Lymphadenopathy: No lymphadenopathy noted. · Neurological: Alert and oriented to person, place, and time. Normal motor function, Normal sensory function, No focal deficits noted. He has normal strength. · Skin: Skin is warm, dry and intact. No obvious lesions on exposed skin. Bilateral axilla examined, no erythema, rash, or any other abnormalities  · Psychiatric: Normal mood and affect. Speech is normal and behavior is normal.     Nursing note and vitals reviewed. Blood pressure 138/76, pulse 97, temperature 97.2 °F (36.2 °C), temperature source Temporal, weight 127 lb 3.2 oz (57.7 kg), SpO2 98 %. Body mass index is 25.69 kg/m². Wt Readings from Last 3 Encounters:   01/28/21 127 lb 3.2 oz (57.7 kg)   01/17/21 124 lb (56.2 kg)   10/26/20 124 lb (56.2 kg)     BP Readings from Last 3 Encounters:   01/28/21 138/76   01/17/21 (!) 158/82   10/26/20 (!) 202/94       No results found for this visit on 01/28/21.     Completed Orders/Prescriptions   Orders Placed This Encounter   Medications    DISCONTD: metoprolol tartrate (LOPRESSOR) 50 MG tablet     Sig: Take 2 tablets in the morning  Take 1 tablet at night     Dispense:  60 tablet     Refill:  2    clotrimazole-betamethasone (LOTRISONE) 1-0.05 % cream     Sig: APPLY TO AFFECTED AREA(S) TWO TIMES A DAY     Dispense:  1 Tube     Refill:  2    metoprolol succinate (TOPROL XL) 50 MG extended release tablet     Sig: Take 1 tablet in the morning  Take 1/2 tablet at night     Dispense:  30 tablet     Refill:  5               AssessmentPlan/Medical Decision Making     1. Essential hypertension  - continues to have elevated b/p - elevated on arrival to office today  - reviewed DASH diet  - will increase to 50mg a.m. and 25mg p.m.  - metoprolol succinate (TOPROL XL) 50 MG extended release tablet; Take 1 tablet in the morning  Take 1/2 tablet at night  Dispense: 30 tablet; Refill: 5    2. Moderate persistent asthma without complication  - controlled  - f/u and medications per pulmonology - Dr. Deisy Patel    3. Gastroesophageal reflux disease without esophagitis  - controlled with omeprazole 20mg daily  - continue to monitor    4. Post-menopausal  - DEXA BONE DENSITY 2 SITES; Future    5. Encounter for screening mammogram for malignant neoplasm of breast  - LINDA DIGITAL SCREEN W OR WO CAD BILATERAL; Future    6. Colon cancer screening  - Cologuard (For External Results Only); Future    7. Encounter for hepatitis C screening test for low risk patient  - Hepatitis C Antibody; Future    8. Screening cholesterol level  - Lipid, Fasting; Future    9. Thyroid disorder screen  - TSH; Future  - T4, Free; Future    10. Skin sensitivity  - hx of fungal infection  - clotrimazole-betamethasone (LOTRISONE) 1-0.05 % cream; APPLY TO AFFECTED AREA(S) TWO TIMES A DAY  Dispense: 1 Tube; Refill: 2    11. Encounter for medical examination to establish care    Return in about 1 month (around 2/28/2021) for elevated b/p.    1.  Ernesto Huffman received counseling on the following healthy behaviors: nutrition, exercise and medication adherence  2. Patient given educational materials - see patient instructions  3. Was a self-tracking handout given in paper form or via [email or web address]? No  If yes, see orders or list here. 4.  Discussed use, benefit, and side effects of prescribed medications. Barriers to medication compliance addressed. All patient questions answered. Pt voiced understanding. 5.  Reviewed prior labs, imaging, consultation, follow up, and health maintenance  6. Continue current medications, diet and exercise. 7. Discussed use, benefit, and side effects of prescribed medications. Barriers to medication compliance addressed. All her questions were answered. Pt voiced understanding. Abbi Murray will continue current medications, diet and exercise. Patient given educational materials on DASH diet    Of the 35 minute duration appointment visit, Carol Sheridan CNP spent at least 50% of the face-to-face time in counseling, explanation of diagnosis, planning of further management, and answering all questions. Signed:  Caorl Sheridan CNP    This note is created with the assistance of a speech-recognition program.  While intending to generate a document that actually reflects the content of the visit, no guarantees can be provided that every mistake has been identified and corrected by editing.

## 2021-02-09 ENCOUNTER — HOSPITAL ENCOUNTER (OUTPATIENT)
Age: 66
Discharge: HOME | End: 2021-02-09
Payer: MEDICARE

## 2021-02-09 DIAGNOSIS — Z13.29 THYROID DISORDER SCREEN: ICD-10-CM

## 2021-02-09 DIAGNOSIS — Z11.59 ENCOUNTER FOR HEPATITIS C SCREENING TEST FOR LOW RISK PATIENT: ICD-10-CM

## 2021-02-09 DIAGNOSIS — Z13.220 SCREENING CHOLESTEROL LEVEL: ICD-10-CM

## 2021-02-09 LAB
CHOLESTEROL, FASTING: 297 MG/DL
CHOLESTEROL/HDL RATIO: 5
HDLC SERPL-MCNC: 59 MG/DL
HEPATITIS C ANTIBODY: NONREACTIVE
LDL CHOLESTEROL: 217 MG/DL (ref 0–130)
THYROXINE, FREE: 1 NG/DL (ref 0.93–1.7)
TRIGLYCERIDE, FASTING: 107 MG/DL
TSH SERPL DL<=0.05 MIU/L-ACNC: 1.77 MIU/L (ref 0.3–5)
VLDLC SERPL CALC-MCNC: ABNORMAL MG/DL (ref 1–30)

## 2021-02-09 PROCEDURE — 80061 LIPID PANEL: CPT

## 2021-02-09 PROCEDURE — 84439 ASSAY OF FREE THYROXINE: CPT

## 2021-02-09 PROCEDURE — 84443 ASSAY THYROID STIM HORMONE: CPT

## 2021-02-09 PROCEDURE — 36415 COLL VENOUS BLD VENIPUNCTURE: CPT

## 2021-02-09 PROCEDURE — 86803 HEPATITIS C AB TEST: CPT

## 2021-02-10 ENCOUNTER — IMMUNIZATION (OUTPATIENT)
Dept: FAMILY MEDICINE CLINIC | Age: 66
End: 2021-02-10
Payer: MEDICARE

## 2021-02-10 PROCEDURE — 91301 COVID-19, MODERNA VACCINE 100MCG/0.5ML DOSE: CPT | Performed by: INTERNAL MEDICINE

## 2021-02-10 PROCEDURE — 0011A COVID-19, MODERNA VACCINE 100MCG/0.5ML DOSE: CPT | Performed by: INTERNAL MEDICINE

## 2021-02-16 DIAGNOSIS — E78.00 HYPERCHOLESTEROLEMIA: Primary | ICD-10-CM

## 2021-02-16 RX ORDER — ATORVASTATIN CALCIUM 20 MG/1
20 TABLET, FILM COATED ORAL DAILY
Qty: 90 TABLET | Refills: 1 | Status: SHIPPED | OUTPATIENT
Start: 2021-02-16 | End: 2021-08-10 | Stop reason: SDUPTHER

## 2021-03-10 ENCOUNTER — IMMUNIZATION (OUTPATIENT)
Dept: FAMILY MEDICINE CLINIC | Age: 66
End: 2021-03-10
Payer: MEDICARE

## 2021-03-10 PROCEDURE — 91301 COVID-19, MODERNA VACCINE 100MCG/0.5ML DOSE: CPT | Performed by: INTERNAL MEDICINE

## 2021-03-10 PROCEDURE — 0012A COVID-19, MODERNA VACCINE 100MCG/0.5ML DOSE: CPT | Performed by: INTERNAL MEDICINE

## 2021-04-01 DIAGNOSIS — Z12.11 COLON CANCER SCREENING: ICD-10-CM

## 2021-04-29 ENCOUNTER — HOSPITAL ENCOUNTER (OUTPATIENT)
Dept: MAMMOGRAPHY | Age: 66
Discharge: HOME | End: 2021-05-01
Payer: MEDICARE

## 2021-04-29 DIAGNOSIS — Z12.31 ENCOUNTER FOR SCREENING MAMMOGRAM FOR MALIGNANT NEOPLASM OF BREAST: ICD-10-CM

## 2021-04-29 DIAGNOSIS — M81.0 AGE-RELATED OSTEOPOROSIS WITHOUT CURRENT PATHOLOGICAL FRACTURE: Primary | ICD-10-CM

## 2021-04-29 DIAGNOSIS — Z78.0 POST-MENOPAUSAL: ICD-10-CM

## 2021-04-29 PROCEDURE — 77080 DXA BONE DENSITY AXIAL: CPT

## 2021-04-29 PROCEDURE — 77063 BREAST TOMOSYNTHESIS BI: CPT

## 2021-04-29 RX ORDER — ALENDRONATE SODIUM 70 MG/1
70 TABLET ORAL
Qty: 12 TABLET | Refills: 1 | Status: SHIPPED | OUTPATIENT
Start: 2021-04-29 | Stop reason: SDUPTHER

## 2021-05-03 ENCOUNTER — OFFICE VISIT (OUTPATIENT)
Dept: FAMILY MEDICINE CLINIC | Age: 66
End: 2021-05-03
Payer: MEDICARE

## 2021-05-03 VITALS
TEMPERATURE: 96.9 F | BODY MASS INDEX: 26.26 KG/M2 | DIASTOLIC BLOOD PRESSURE: 74 MMHG | WEIGHT: 130 LBS | HEART RATE: 83 BPM | OXYGEN SATURATION: 97 % | SYSTOLIC BLOOD PRESSURE: 124 MMHG

## 2021-05-03 DIAGNOSIS — M81.0 AGE-RELATED OSTEOPOROSIS WITHOUT CURRENT PATHOLOGICAL FRACTURE: Primary | ICD-10-CM

## 2021-05-03 PROCEDURE — G8417 CALC BMI ABV UP PARAM F/U: HCPCS | Performed by: NURSE PRACTITIONER

## 2021-05-03 PROCEDURE — 1036F TOBACCO NON-USER: CPT | Performed by: NURSE PRACTITIONER

## 2021-05-03 PROCEDURE — 4040F PNEUMOC VAC/ADMIN/RCVD: CPT | Performed by: NURSE PRACTITIONER

## 2021-05-03 PROCEDURE — 1123F ACP DISCUSS/DSCN MKR DOCD: CPT | Performed by: NURSE PRACTITIONER

## 2021-05-03 PROCEDURE — 1090F PRES/ABSN URINE INCON ASSESS: CPT | Performed by: NURSE PRACTITIONER

## 2021-05-03 PROCEDURE — 3017F COLORECTAL CA SCREEN DOC REV: CPT | Performed by: NURSE PRACTITIONER

## 2021-05-03 PROCEDURE — G8427 DOCREV CUR MEDS BY ELIG CLIN: HCPCS | Performed by: NURSE PRACTITIONER

## 2021-05-03 PROCEDURE — 99213 OFFICE O/P EST LOW 20 MIN: CPT | Performed by: NURSE PRACTITIONER

## 2021-05-03 PROCEDURE — G8399 PT W/DXA RESULTS DOCUMENT: HCPCS | Performed by: NURSE PRACTITIONER

## 2021-05-03 ASSESSMENT — PATIENT HEALTH QUESTIONNAIRE - PHQ9
SUM OF ALL RESPONSES TO PHQ QUESTIONS 1-9: 0
1. LITTLE INTEREST OR PLEASURE IN DOING THINGS: 0
SUM OF ALL RESPONSES TO PHQ9 QUESTIONS 1 & 2: 0
SUM OF ALL RESPONSES TO PHQ QUESTIONS 1-9: 0
SUM OF ALL RESPONSES TO PHQ QUESTIONS 1-9: 0

## 2021-05-03 NOTE — PATIENT INSTRUCTIONS
disease. Some people who do not get enough vitamin D may need supplements. Are there any risks from taking vitamin D?  · Too much vitamin D:  ? Can damage your kidneys. ? Can cause nausea and vomiting, constipation, and weakness. ? Raises the amount of calcium in your blood. If this happens, you can get confused or have an irregular heart rhythm. · Vitamin D may interact with other medicines. Tell your doctor about all of the medicines you take, including over-the-counter drugs, herbs, and pills. Tell your doctor about all of your current medical problems. Where can you learn more? Go to [email or web address]://[email or web address].[email or web address] org and sign in to your [employer] account. Enter [id number] in the CIRQY box to learn more about \"Learning About Vitamin D. \"     If you do not have an account, please click on the \"Sign Up Now\" link. Current as of: December 17, 2020               Content Version: 12.8  © 2006-2021 [employer]. Care instructions adapted under license by [facility] ([facility]). If you have questions about a medical condition or this instruction, always ask your healthcare professional. [place] [address] any warranty or liability for your use of this information. calcium acetate  Pronunciation:  JOSE ANGEL see um AS e matias  Brand:  Calphron, Eliphos, PhosLo Gelcap, Phoslyra  What is the most important information I should know about calcium acetate? You should not use calcium acetate if you have high levels of calcium in your blood. What is calcium acetate? Calcium is a mineral that is needed for many functions of the body, especially bone formation and maintenance. Calcium can also bind to other minerals such as phosphate, and aid in their removal from the body. Calcium acetate is used to control phosphate levels to keep them from getting too high in people with kidney failure. Calcium acetate may also be used for purposes not listed in this medication guide.   What should I discuss with my healthcare provider before taking calcium acetate? You should not use calcium acetate if you are allergic to it, or if you have high levels of calcium in your blood. Ask a doctor or pharmacist if it is safe for you to use this medicine if you have other medical conditions, especially:  · if your kidney disease gets worse; or  · if you also take digoxin (digitalis, Lanoxin). Ask a doctor before using this medicine if you are pregnant or breast-feeding. Your dose needs may be different during pregnancy or while you are nursing. Do not give this medicine to a child without medical advice. How should I take calcium acetate? Use exactly as directed on the label, or as prescribed by your doctor. Do not use in larger or smaller amounts or for longer than recommended. Your doctor may occasionally change your dose to make sure you get the best results. Take this medicine with each meal, unless your doctor tells you otherwise. You may need to keep a food diary to measure how much calcium you are getting in your diet. You may need frequent medical tests to help your doctor determine how long to treat you with calcium acetate. Store at room temperature away from moisture and heat. What happens if I miss a dose? Take the missed dose as soon as you remember. Skip the missed dose if it is almost time for your next scheduled dose. Do not take extra medicine to make up the missed dose. What happens if I overdose? Seek emergency medical attention or call the Poison Help line at 1-825.332.8127. What should I avoid while taking calcium acetate? Do not take additional calcium supplements unless your doctor has told you to. Avoid using antacids without your doctor's advice. Use only the specific type of antacid your doctor recommends. Many antacids contain calcium and you could be getting too much of this mineral if you take a calcium antacid with calcium acetate.   Calcium acetate can make it complete, but no guarantee is made to that effect. Drug information contained herein may be time sensitive. [employer] information has been compiled for use by healthcare practitioners and consumers in the [place] and therefore [employer] does not warrant that uses outside of the [place] are appropriate, unless specifically indicated otherwise. [facility]'s drug information does not endorse drugs, diagnose patients or recommend therapy. [facility] drug information is an informational resource designed to assist licensed healthcare practitioners in caring for their patients and/or to serve consumers viewing this service as a supplement to, and not a substitute for, the expertise, skill, knowledge and judgment of healthcare practitioners. The absence of a warning for a given drug or drug combination in no way should be construed to indicate that the drug or drug combination is safe, effective or appropriate for any given patient. [facility] does not assume any responsibility for any aspect of healthcare administered with the aid of information [employer] provides. The information contained herein is not intended to cover all possible uses, directions, precautions, warnings, drug interactions, allergic reactions, or adverse effects. If you have questions about the drugs you are taking, check with your doctor, nurse or pharmacist.  Copyright 7640-8203 [address]. Version: 8.02. Revision date: 3/15/2017. Care instructions adapted under license by [facility] ([facility]). If you have questions about a medical condition or this instruction, always ask your healthcare professional. [place] [address] any warranty or liability for your use of this information. Osteoporosis: Care Instructions  Overview     Osteoporosis causes bones to become thin and weak. It is much more common in women than in men. Your chances of getting this disease depend on several things.  These factors include the thickness of your bones (bone density), as well as health, diet, and physical activity. This disease may be very advanced before you know you have it. Sometimes the first sign is a broken bone in the hip, spine, or wrist. Or you may have sudden pain in your middle or lower back. Follow-up care is a key part of your treatment and safety. Be sure to make and go to all appointments, and call your doctor if you are having problems. It's also a good idea to know your test results and keep a list of the medicines you take. How can you care for yourself at home? · Your doctor may prescribe a bisphosphonate, such as risedronate (Actonel) or alendronate (Fosamax), for osteoporosis. If you are taking one of these medicines by mouth:  ? Take your medicine with a full glass of water when you first get up in the morning. ? Do not lie down, eat, drink a beverage, or take any other medicine for at least 30 minutes after taking the drug. This helps prevent stomach problems. ? Do not take your medicine late in the day if you forgot to take it in the morning. Skip it, and take the usual dose the next morning. ? If you have side effects, tell your doctor. Your doctor may prescribe another medicine. · Get enough calcium and vitamin D. The recommended daily allowances (RDAs) for adults younger than age 46 are 1,000 mg of calcium and 600 IU of vitamin D each day. Women ages 46 to 79 need 1,200 mg of calcium and 600 IU of vitamin D each day. Men ages 46 to 79 need 1,000 mg of calcium and 600 IU of vitamin D each day. Adults 71 and older need 1,200 mg of calcium and 800 IU of vitamin D each day. It's not clear if people who already have osteoporosis need more calcium and vitamin D than this. Talk to your doctor about what's right for you.  ? Eat foods rich in calcium, like yogurt, cheese, milk, and dark green vegetables. This is a good way to get the calcium you need. You can get vitamin D from eggs, fatty fish, cereal, and milk. ?  Ask your doctor if you need to take a calcium plus vitamin D supplement. You may be able to get enough calcium and vitamin D through your diet. Be careful with supplements. Adults ages 23 to 48 should not get more than 2,500 mg of calcium and 4,000 IU of vitamin D each day, whether it is from supplements and/or food. Adults ages 46 and older should not get more than 2,000 mg of calcium and 4,000 IU of vitamin D each day from supplements and/or food. · Limit alcohol to 2 drinks a day for men and 1 drink a day for women. Too much alcohol can cause health problems. · Do not smoke. Smoking puts you at a much higher risk for osteoporosis. If you need help quitting, talk to your doctor about stop-smoking programs and medicines. These can increase your chances of quitting for good. · Get regular bone-building exercise. Weight-bearing and resistance exercises keep bones healthy by working the muscles and bones against gravity. Start out at an exercise level that feels right for you. Add a little at a time until you can do the following:  ? Do 30 minutes of weight-bearing exercise on most days of the week. Walking, jogging, stair climbing, and dancing are good choices. ? Do resistance exercises with weights or elastic bands 2 to 3 days a week. · Reduce your risk of falls:  ? Wear supportive shoes with low heels and nonslip soles. ? Use a cane or walker, if you need it. Use shower chairs and bath benches. Put in handrails on stairways, around your shower or tub area, and near the toilet. ? Keep stairs, porches, and walkways well lit. Use night-lights. ? Remove throw rugs and other objects that are in the way. ? Avoid icy, wet, or slippery surfaces. ? Keep a cordless phone and a flashlight with new batteries by your bed. When should you call for help? Watch closely for changes in your health, and be sure to contact your doctor if you have any problems. Where can you learn more? Go to https://evelyn.health-partners. org and sign in to your [employer] account. Enter K100 in the [facility] box to learn more about \"Osteoporosis: Care Instructions. \"     If you do not have an account, please click on the \"Sign Up Now\" link. Current as of: December 7, 2020               Content Version: 12.8  © 8867-2711 [employer]. Care instructions adapted under license by [facility]. If you have questions about a medical condition or this instruction, always ask your healthcare professional. [address] any warranty or liability for your use of this information. alendronate  Pronunciation:  laina james  Brand:  Binosto, Fosamax  What is the most important information I should know about alendronate? You should not take alendronate if you have problems with your esophagus, or low levels of calcium in your blood. Do not take alendronate if you cannot sit upright or stand for at least 30 minutes after taking the medicine. Alendronate can cause serious problems in the stomach or esophagus. Stop using alendronate and call your doctor at once if you have chest pain, new or worsening heartburn, or pain when swallowing. Also call your doctor if you have muscle spasms, numbness or tingling (in hands and feet or around the mouth), new or unusual hip pain, or severe pain in your joints, bones, or muscles. What is alendronate? Alendronate is used to treat osteoporosis caused by menopause, steroid use, or gonadal failure. This medicine is for use when you have a high risk of bone fracture due to osteoporosis. Alendronate is also used to treat Paget's disease of bone. Alendronate may also be used for purposes not listed in this medication guide. What should I discuss with my healthcare provider before taking alendronate?   You should not take alendronate if you are allergic to it, or if you have:  · low levels of calcium in your blood (hypocalcemia); or  · problems with the muscles in your esophagus (the tube that connects your mouth and stomach). Do not take alendronate if you cannot sit upright or stand for at least 30 minutes. Alendronate can cause serious problems in the stomach or esophagus. You must stay upright for at least 30 minutes after taking this medicine. Tell your doctor if you have ever had:  · trouble swallowing;  · problems with your stomach or digestion;  · hypocalcemia;  · a dental problem (you may need a dental exam before you begin taking alendronate);  · kidney disease; or  · any condition that makes it hard for your body to absorb nutrients from food (malabsorption). The effervescent tablet contains a lot of sodium. Tell your doctor if you are on a low-salt diet before using this form of alendronate. This medicine may cause jaw bone problems (osteonecrosis). The risk is highest in people with cancer, blood cell disorders, pre-existing dental problems, or people treated with steroids, chemotherapy, or radiation. Ask your doctor about your own risk. It is not known whether this medicine will harm an unborn baby. Tell your doctor if you are pregnant or trying to become pregnant. Stop using the medicine and tell your doctor right away if you become pregnant. It may not be safe to breastfeed while using this medicine. Ask your doctor about any risk. How should I take alendronate? Follow all directions on your prescription label and read all medication guides or instruction sheets. Use the medicine exactly as directed. Alendronate is taken either once daily or once per week. Follow your doctor's dosing instructions very carefully. Take alendronate first thing in the morning, at least 30 minutes before you eat or drink anything or take any other medicine. If you take alendronate only once per week, take it on the same day each week and always first thing in the morning. Take with a full glass (6 to 8 ounces) of plain water. Do not use coffee, tea, soda, juice, or mineral water.  Do glass of milk and seek emergency medical attention or call the Poison Help line at 1-356.137.5339. Do not make yourself vomit and do not lie down. What should I avoid while taking alendronate? Avoid taking any other medicines for at least 30 minutes after taking alendronate. This includes vitamins, calcium, and antacids. Some medicines can make it harder for your body to absorb alendronate. Avoid smoking, or try to quit. Smoking can reduce your bone mineral density, making fractures more likely. Avoid drinking large amounts of alcohol. Heavy drinking can also cause bone loss. What are the possible side effects of alendronate? Get emergency medical help if you have signs of an allergic reaction: hives; wheezing, difficulty breathing; swelling of your face, lips, tongue, or throat. Stop using alendronate and call your doctor at once if you have:  · chest pain, new or worsening heartburn;  · difficulty or pain when swallowing;  · pain or burning under the ribs or in the back;  · severe heartburn, burning pain in your upper stomach, or coughing up blood;  · new or unusual pain in your thigh or hip;  · jaw pain, numbness, or swelling;  · severe joint, bone, or muscle pain; or  · low calcium levels --muscle spasms or contractions, numbness or tingly feeling (around your mouth, or in your fingers and toes). Common side effects may include:  · heartburn, upset stomach;  · stomach pain, nausea;  · diarrhea, constipation; or  · bone pain, muscle or joint pain. This is not a complete list of side effects and others may occur. Call your doctor for medical advice about side effects. You may report side effects to FDA at 4-725-GCP-0157. What other drugs will affect alendronate? Tell your doctor about all your other medicines, especially:  · aspirin; or  · NSAIDs (nonsteroidal anti-inflammatory drugs) --ibuprofen (Advil, Motrin), naproxen (Aleve), celecoxib, diclofenac, indomethacin, meloxicam, and others.   This list is not complete. Other drugs may affect alendronate, including prescription and over-the-counter medicines, vitamins, and herbal products. Not all possible drug interactions are listed here. Where can I get more information? Your pharmacist can provide more information about alendronate. Remember, keep this and all other medicines out of the reach of children, never share your medicines with others, and use this medication only for the indication prescribed. Every effort has been made to ensure that the information provided by [address] is accurate, up-to-date, and complete, but no guarantee is made to that effect. Drug information contained herein may be time sensitive. [facility] information has been compiled for use by healthcare practitioners and consumers in the [place] and therefore [facility] does not warrant that uses outside of the [place] are appropriate, unless specifically indicated otherwise. [facility]'s drug information does not endorse drugs, diagnose patients or recommend therapy. [facility] drug information is an informational resource designed to assist licensed healthcare practitioners in caring for their patients and/or to serve consumers viewing this service as a supplement to, and not a substitute for, the expertise, skill, knowledge and judgment of healthcare practitioners. The absence of a warning for a given drug or drug combination in no way should be construed to indicate that the drug or drug combination is safe, effective or appropriate for any given patient. [facility] does not assume any responsibility for any aspect of healthcare administered with the aid of information [facility] provides. The information contained herein is not intended to cover all possible uses, directions, precautions, warnings, drug interactions, allergic reactions, or adverse effects.  If you have questions about the drugs you are taking, check with your doctor, nurse or pharmacist.  Copyright 3833-4172 [facility] [facility]. Version: 15.01. Revision date: 10/19/2020. Care instructions adapted under license by [facility] ([facility]). If you have questions about a medical condition or this instruction, always ask your healthcare professional. [address] any warranty or liability for your use of this information.

## 2021-05-03 NOTE — PROGRESS NOTES
Wes Awan, FNP-C  [address]  [id number] [facility]. [facility]  [facility] [address]  [phone]  [phone]    Rachel Juárez is a 78 y.o. female who is here with c/o of:    Chief Complaint: Results (discuss dexa scan)      Patient Accompanied by: n/a    HPI - Rachel Juárez is here today for the following: osteoporosis    Patient recently had Dexa Scan completed: osteoporosis noted and fosamax Rx sent  Patient is here today to discuss these results and clarification of dx and Rx            Patient Active Problem List   Diagnosis    GERD (gastroesophageal reflux disease)    Elevated lipids    Asthma    Hypertensive urgency    Essential hypertension     Past Medical History:   Diagnosis Date    Essential hypertension 1/17/2021    GERD (gastroesophageal reflux disease)     Hyperlipidemia     RAD (reactive airway disease)       Past Surgical History:   Procedure Laterality Date    COLONOSCOPY  2007; due 2017     No family history on file. Social History     Tobacco Use    Smoking status: Never Smoker    Smokeless tobacco: Never Used   Substance Use Topics    Alcohol use: No     ALLERGIES:    Allergies   Allergen Reactions    Sulfamethoxazole-Trimethoprim           Subjective     · Constitutional:  Negative for activity change, appetite change,unexpected weight change, chills, fever, and fatigue. · HENT: Negative for ear pain, sore throat,  Rhinorrhea, sinus pain, sinus pressure, congestion. · Eyes:  Negative for pain and discharge. · Respiratory:  Negative for chest tightness, shortness of breath, wheezing, and cough. · Cardiovascular:  Negative for chest pain, palpitations and leg swelling. · Gastrointestinal: Negative for abdominal pain, blood in stool, constipation,diarrhea, nausea and vomiting. · Endocrine: Negative for cold intolerance, heat intolerance, polydipsia, polyphagia and polyuria.    · Genitourinary: Negative for difficulty urinating, dysuria, flank pain, frequency, hematuria and urgency. · Musculoskeletal: Negative for arthralgias, back pain, joint swelling, myalgias, neck pain and neck stiffness. · Skin: Negative for rash and wound. · Allergic/Immunologic: Negative for environmental allergies and food allergies. · Neurological:  Negative for dizziness, light-headedness, numbness and headaches. · Hematological:  Negative for adenopathy. Does not bruise/bleed easily. · Psychiatric/Behavioral: Negative for self-injury, sleep disturbance and suicidal ideas. Objective     PHYSICAL EXAM:   · Constitutional: Rashad Renee is oriented to person, place, and time. Vital signs are normal. Appears well-developed and well-nourished. · HEENT:   · Head: Normocephalic and atraumatic. Eyes:PERRL, EOMI, Conjunctiva normal, No discharge. · Neck: Full passive range of motion. · Cardiovascular: Normal rate, regular rhythm, S1, S2, no murmur, no gallop, no friction rub, intact distal pulses. No carotid bruit. No lower extremity edema  · Pulmonary/Chest: Breath sounds are clear throughout, No respiratory distress, No wheezing, No chest tenderness. Effort normal  · Neurological: Alert and oriented to person, place, and time. Normal motor function, Normal sensory function, No focal deficits noted. He has normal strength. · Skin: Skin is warm, dry and intact. No obvious lesions on exposed skin  · Psychiatric: Normal mood and affect. Speech is normal and behavior is normal.     Nursing note and vitals reviewed. Blood pressure 124/74, pulse 83, temperature 96.9 °F (36.1 °C), temperature source Temporal, weight 130 lb (59 kg), SpO2 97 %. Body mass index is 26.26 kg/m². Wt Readings from Last 3 Encounters:   05/03/21 130 lb (59 kg)   01/28/21 127 lb 3.2 oz (57.7 kg)   01/17/21 124 lb (56.2 kg)     BP Readings from Last 3 Encounters:   05/03/21 124/74   01/28/21 138/76   01/17/21 (!) 158/82       No results found for this visit on 05/03/21.     Completed Orders/Prescriptions   No orders of the defined types were placed in this encounter. AssessmentPlan/Medical Decision Making     1. Age-related osteoporosis without current pathological fracture  - patient agrees to start Fosamax, but may need to stop at later date if too costly  - reviewed daily requirements of calcium and Vitamin D3  - reviewed testing with patient and answered all questions to patient satisfaction      Return in about 3 months (around 8/3/2021). 1.  Jg Lam received counseling on the following healthy behaviors: nutrition, exercise and medication adherence  2. Patient given educational materials - see patient instructions  3. Was a self-tracking handout given in paper form or via [employer]? No  If yes, see orders or list here. 4.  Discussed use, benefit, and side effects of prescribed medications. Barriers to medication compliance addressed. All patient questions answered. Pt voiced understanding. 5.  Reviewed prior labs, imaging, consultation, follow up, and health maintenance  6. Continue current medications, diet and exercise. 7. Discussed use, benefit, and side effects of prescribed medications. Barriers to medication compliance addressed. All her questions were answered. Pt voiced understanding. Jg Lam will continue current medications, diet and exercise. Patient given educational materials on fosamax, osteoporosis     Of the 25 minute duration appointment visit, Tyson Noel CNP spent greater than 50% of the face-to-face time in counseling, explanation of diagnosis, planning of further management, and answering all questions. Signed:  Tyson Noel CNP    This note is created with the assistance of a speech-recognition program.  While intending to generate a document that actually reflects the content of the visit, no guarantees can be provided that every mistake has been identified and corrected by editing.

## 2021-06-14 ENCOUNTER — OFFICE VISIT (OUTPATIENT)
Dept: PULMONOLOGY | Age: 66
End: 2021-06-14
Payer: MEDICARE

## 2021-06-14 VITALS
SYSTOLIC BLOOD PRESSURE: 138 MMHG | RESPIRATION RATE: 12 BRPM | HEIGHT: 59 IN | TEMPERATURE: 97.2 F | OXYGEN SATURATION: 100 % | DIASTOLIC BLOOD PRESSURE: 85 MMHG | HEART RATE: 72 BPM | WEIGHT: 128.6 LBS | BODY MASS INDEX: 25.92 KG/M2

## 2021-06-14 DIAGNOSIS — J45.50 ASTHMA, WELL CONTROLLED, SEVERE PERSISTENT: Primary | ICD-10-CM

## 2021-06-14 DIAGNOSIS — K21.9 GASTROESOPHAGEAL REFLUX DISEASE, UNSPECIFIED WHETHER ESOPHAGITIS PRESENT: ICD-10-CM

## 2021-06-14 DIAGNOSIS — Z91.09 MULTIPLE ENVIRONMENTAL ALLERGIES: ICD-10-CM

## 2021-06-14 PROBLEM — N90.4 VULVAR DYSTROPHY: Status: ACTIVE | Noted: 2017-03-20

## 2021-06-14 PROCEDURE — 1036F TOBACCO NON-USER: CPT | Performed by: INTERNAL MEDICINE

## 2021-06-14 PROCEDURE — G8399 PT W/DXA RESULTS DOCUMENT: HCPCS | Performed by: INTERNAL MEDICINE

## 2021-06-14 PROCEDURE — 3017F COLORECTAL CA SCREEN DOC REV: CPT | Performed by: INTERNAL MEDICINE

## 2021-06-14 PROCEDURE — 1123F ACP DISCUSS/DSCN MKR DOCD: CPT | Performed by: INTERNAL MEDICINE

## 2021-06-14 PROCEDURE — G8417 CALC BMI ABV UP PARAM F/U: HCPCS | Performed by: INTERNAL MEDICINE

## 2021-06-14 PROCEDURE — G8427 DOCREV CUR MEDS BY ELIG CLIN: HCPCS | Performed by: INTERNAL MEDICINE

## 2021-06-14 PROCEDURE — 4040F PNEUMOC VAC/ADMIN/RCVD: CPT | Performed by: INTERNAL MEDICINE

## 2021-06-14 PROCEDURE — 99213 OFFICE O/P EST LOW 20 MIN: CPT | Performed by: INTERNAL MEDICINE

## 2021-06-14 PROCEDURE — 1090F PRES/ABSN URINE INCON ASSESS: CPT | Performed by: INTERNAL MEDICINE

## 2021-06-14 ASSESSMENT — ENCOUNTER SYMPTOMS
EYES NEGATIVE: 1
GASTROINTESTINAL NEGATIVE: 1
RESPIRATORY NEGATIVE: 1

## 2021-06-14 NOTE — PROGRESS NOTES
Subjective:      Patient ID: Vera Kolb is a 78 y.o. female being seen in my clinic for   Chief Complaint   Patient presents with    Asthma       HPI  Follow-up visit for asthma. Since her visit last for fall her asthma is been under much better control. Currently on Symbicort 160/4.5, montelukast 10 mg nightly, and albuterol. States that some days she uses her albuterol twice a day other days not at all. She was on prednisone last fall which she tapered off. Eosinophil count was 0. IgE elevated at 346. Patient received both of her Covid vaccinations. Blood pressure much better controlled. Overall happy with the quality of her life. Review of Systems   Constitutional: Negative. HENT: Negative. Eyes: Negative. Respiratory: Negative. Cardiovascular: Negative. Gastrointestinal: Negative. All other systems reviewed and are negative.       Objective:     Vitals:    06/14/21 1532   BP: 138/85   Pulse: 72   Resp: 12   Temp: 97.2 °F (36.2 °C)   TempSrc: Temporal   SpO2: 100%   Weight: 128 lb 9.6 oz (58.3 kg)   Height: 4' 11\" (1.499 m)     Current Outpatient Medications   Medication Sig Dispense Refill    alendronate (FOSAMAX) 70 MG tablet Take 1 tablet by mouth every 7 days 12 tablet 1    atorvastatin (LIPITOR) 20 MG tablet Take 1 tablet by mouth daily 90 tablet 1    clotrimazole-betamethasone (LOTRISONE) 1-0.05 % cream APPLY TO AFFECTED AREA(S) TWO TIMES A DAY 1 Tube 2    metoprolol succinate (TOPROL XL) 50 MG extended release tablet Take 1 tablet in the morning  Take 1/2 tablet at night 30 tablet 5    omeprazole (PRILOSEC) 20 MG delayed release capsule TAKE ONE CAPSULE BY MOUTH DAILY 90 capsule 2    budesonide-formoterol (SYMBICORT) 160-4.5 MCG/ACT AERO Inhale 2 puffs into the lungs 2 times daily 3 Inhaler 3    montelukast (SINGULAIR) 10 MG tablet Take 1 tablet by mouth nightly 90 tablet 3    albuterol sulfate HFA (PROVENTIL HFA) 108 (90 Base) MCG/ACT inhaler Inhale 2 puffs into the lungs every 6 hours as needed for Wheezing 3 Inhaler 3     No current facility-administered medications for this visit. Physical Exam  Vitals and nursing note reviewed. Constitutional:       Appearance: She is well-developed. HENT:      Head: Normocephalic and atraumatic. Nose: Nose normal. No congestion. Mouth/Throat:      Mouth: Mucous membranes are moist.      Pharynx: Oropharynx is clear. No oropharyngeal exudate. Comments: Edentulous with upper and lower plate  Eyes:      General: No scleral icterus. Conjunctiva/sclera: Conjunctivae normal.   Neck:      Thyroid: No thyromegaly. Vascular: No JVD. Trachea: No tracheal deviation. Cardiovascular:      Rate and Rhythm: Normal rate and regular rhythm. Heart sounds: Normal heart sounds. No murmur heard. No gallop. Pulmonary:      Effort: Pulmonary effort is normal. No respiratory distress. Breath sounds: No wheezing or rales. Chest:      Chest wall: No tenderness. Abdominal:      Palpations: Abdomen is soft. Tenderness: There is no abdominal tenderness. Musculoskeletal:      Cervical back: Neck supple. Right lower leg: No edema. Left lower leg: No edema. Lymphadenopathy:      Cervical: No cervical adenopathy. Skin:     General: Skin is warm and dry. Comments: No skin thinning   Neurological:      Mental Status: She is alert and oriented to person, place, and time.        Wt Readings from Last 3 Encounters:   06/14/21 128 lb 9.6 oz (58.3 kg)   05/03/21 130 lb (59 kg)   01/28/21 127 lb 3.2 oz (57.7 kg)     Results for orders placed or performed during the hospital encounter of 02/09/21   T4, Free   Result Value Ref Range    Thyroxine, Free 1.00 0.93 - 1.70 ng/dL   TSH   Result Value Ref Range    TSH 1.77 0.30 - 5.00 mIU/L   Hepatitis C Antibody   Result Value Ref Range    Hepatitis C Ab NONREACTIVE NONREACTIVE   Lipid, Fasting   Result Value Ref Range    Cholesterol, Fasting 297 (H) <200 mg/dL    HDL 59 >40 mg/dL    LDL Cholesterol 217 (H) 0 - 130 mg/dL    Chol/HDL Ratio 5.0 (H) <5    Triglyceride, Fasting 107 <150 mg/dL    VLDL NOT REPORTED 1 - 30 mg/dL       :      1. Asthma, well controlled, severe persistent    2. Multiple environmental allergies    3. Gastroesophageal reflux disease, unspecified whether esophagitis present      Patient Active Problem List   Diagnosis    GERD (gastroesophageal reflux disease)    Elevated lipids    Asthma    Hypertensive urgency    Essential hypertension    Vulvar dystrophy         Plan:      1. Continue current treatment. Discussed strategies for management of asthma including escalation and de-escalation of therapy. 2. Potentially patient not candidate for Xolair should her symptoms become difficult to control. 3. Return in 6 months. Sooner if new or advancing symptoms. No orders of the defined types were placed in this encounter. No orders of the defined types were placed in this encounter. Return in about 6 months (around 12/14/2021).        Electronically signed by Edward Barnes DO on 6/14/2021at 4:20 PM

## 2021-07-01 DIAGNOSIS — I10 ESSENTIAL HYPERTENSION: ICD-10-CM

## 2021-07-01 RX ORDER — METOPROLOL SUCCINATE 50 MG/1
TABLET, EXTENDED RELEASE ORAL
Qty: 30 TABLET | Refills: 3 | Status: SHIPPED | OUTPATIENT
Start: 2021-07-01 | End: 2021-08-10 | Stop reason: SDUPTHER

## 2021-07-01 NOTE — TELEPHONE ENCOUNTER
Danish Malcolm is calling to request a refill on the following medication(s):    Medication Request:  Requested Prescriptions     Pending Prescriptions Disp Refills    metoprolol succinate (TOPROL XL) 50 MG extended release tablet [Pharmacy Med Name: METOPROLOL SUCC ER 50 MG TAB] 30 tablet 4     Sig: TAKE ONE TABLET BY MOUTH EVERY MORNING TAKE 1/2 TABLET BY MOUTH AT NIGHT       Last Visit Date (If Applicable):  3/4/1346 In office    Next Visit Date:    8/4/2021

## 2021-08-10 ENCOUNTER — OFFICE VISIT (OUTPATIENT)
Dept: FAMILY MEDICINE CLINIC | Age: 66
End: 2021-08-10
Payer: MEDICARE

## 2021-08-10 VITALS
HEART RATE: 67 BPM | WEIGHT: 126 LBS | DIASTOLIC BLOOD PRESSURE: 68 MMHG | TEMPERATURE: 97 F | BODY MASS INDEX: 25.45 KG/M2 | OXYGEN SATURATION: 97 % | SYSTOLIC BLOOD PRESSURE: 118 MMHG

## 2021-08-10 DIAGNOSIS — K21.9 GASTROESOPHAGEAL REFLUX DISEASE WITHOUT ESOPHAGITIS: ICD-10-CM

## 2021-08-10 DIAGNOSIS — I10 ESSENTIAL HYPERTENSION: ICD-10-CM

## 2021-08-10 DIAGNOSIS — J45.40 MODERATE PERSISTENT ASTHMA WITHOUT COMPLICATION: ICD-10-CM

## 2021-08-10 DIAGNOSIS — R23.8 SKIN SENSITIVITY: ICD-10-CM

## 2021-08-10 DIAGNOSIS — E78.00 HYPERCHOLESTEROLEMIA: Primary | ICD-10-CM

## 2021-08-10 PROBLEM — I16.0 HYPERTENSIVE URGENCY: Status: RESOLVED | Noted: 2021-01-17 | Resolved: 2021-08-10

## 2021-08-10 PROCEDURE — 4040F PNEUMOC VAC/ADMIN/RCVD: CPT | Performed by: NURSE PRACTITIONER

## 2021-08-10 PROCEDURE — 99214 OFFICE O/P EST MOD 30 MIN: CPT | Performed by: NURSE PRACTITIONER

## 2021-08-10 PROCEDURE — G8417 CALC BMI ABV UP PARAM F/U: HCPCS | Performed by: NURSE PRACTITIONER

## 2021-08-10 PROCEDURE — G8427 DOCREV CUR MEDS BY ELIG CLIN: HCPCS | Performed by: NURSE PRACTITIONER

## 2021-08-10 PROCEDURE — 1090F PRES/ABSN URINE INCON ASSESS: CPT | Performed by: NURSE PRACTITIONER

## 2021-08-10 PROCEDURE — 1036F TOBACCO NON-USER: CPT | Performed by: NURSE PRACTITIONER

## 2021-08-10 PROCEDURE — 1123F ACP DISCUSS/DSCN MKR DOCD: CPT | Performed by: NURSE PRACTITIONER

## 2021-08-10 PROCEDURE — G8399 PT W/DXA RESULTS DOCUMENT: HCPCS | Performed by: NURSE PRACTITIONER

## 2021-08-10 PROCEDURE — 3017F COLORECTAL CA SCREEN DOC REV: CPT | Performed by: NURSE PRACTITIONER

## 2021-08-10 RX ORDER — OMEPRAZOLE 20 MG/1
CAPSULE, DELAYED RELEASE ORAL
Qty: 90 CAPSULE | Refills: 1 | Status: SHIPPED | OUTPATIENT
Start: 2021-08-10 | End: 2022-01-21 | Stop reason: SDUPTHER

## 2021-08-10 RX ORDER — CLOTRIMAZOLE AND BETAMETHASONE DIPROPIONATE 10; .64 MG/G; MG/G
CREAM TOPICAL
Qty: 1 TUBE | Refills: 2 | Status: SHIPPED | OUTPATIENT
Start: 2021-08-10 | End: 2021-08-10

## 2021-08-10 RX ORDER — CLOTRIMAZOLE AND BETAMETHASONE DIPROPIONATE 10; .64 MG/G; MG/G
CREAM TOPICAL
Qty: 45 G | Refills: 5 | Status: SHIPPED | OUTPATIENT
Start: 2021-08-10

## 2021-08-10 RX ORDER — METOPROLOL SUCCINATE 50 MG/1
50 TABLET, EXTENDED RELEASE ORAL DAILY
Qty: 30 TABLET | Refills: 3
Start: 2021-08-10 | End: 2021-10-25

## 2021-08-10 RX ORDER — ATORVASTATIN CALCIUM 20 MG/1
20 TABLET, FILM COATED ORAL DAILY
Qty: 90 TABLET | Refills: 1 | Status: SHIPPED | OUTPATIENT
Start: 2021-08-10 | End: 2022-02-02

## 2021-08-10 ASSESSMENT — PATIENT HEALTH QUESTIONNAIRE - PHQ9
SUM OF ALL RESPONSES TO PHQ QUESTIONS 1-9: 0
SUM OF ALL RESPONSES TO PHQ9 QUESTIONS 1 & 2: 0
2. FEELING DOWN, DEPRESSED OR HOPELESS: 0
SUM OF ALL RESPONSES TO PHQ QUESTIONS 1-9: 0
SUM OF ALL RESPONSES TO PHQ QUESTIONS 1-9: 0
1. LITTLE INTEREST OR PLEASURE IN DOING THINGS: 0

## 2021-08-10 NOTE — PROGRESS NOTES
Tiesha Guevara, FNP-C  [address]  [id number] [facility]. [facility]  [place], [facility]  [phone]  [phone]    Rosy Lebron is a 78 y.o. female who is here with c/o of:    Chief Complaint: Hypertension      Patient Accompanied by: n/a    HPI - Rosy Lebron is here today to establish care and for ED f/u for high b/p    HTN   - she has c/o intermittent dizziness that started several years ago - has not worsened since starting b/p medication   - she admits to drinking only 2-3 bottles of water daily   - she is adherent to low sodium diet, does not eat meat, does not exercise regularly   - she denies h/a, c/p, palpitations, sob, lower extremity edema   - current med: metoprolol 25mg bid and denies any adverse affects    Asthma   - under the care of pulmonology - Dr. Brittny Lopez   - reports using albuterol inhaler 3 times per week   - current meds: symbicort 2 puff bid, singulair 10mg daily     Axillary pruritis   - this is a problem of a few years and is intermittent   - she reports intermittent redness, itching and burning    - she has been Lotrisone for this and this cleared her symptoms    Patient Active Problem List   Diagnosis    Gastroesophageal reflux disease without esophagitis    Elevated lipids    Essential hypertension    Vulvar dystrophy    Skin sensitivity    Hypercholesterolemia    Moderate persistent asthma without complication        Past Medical History:   Diagnosis Date    Asthma 10/3/2013    Essential hypertension 1/17/2021    GERD (gastroesophageal reflux disease)     Hyperlipidemia     Hypertensive urgency 1/17/2021    RAD (reactive airway disease)       Past Surgical History:   Procedure Laterality Date    COLONOSCOPY  2007; due 2017     No family history on file.   Social History     Tobacco Use    Smoking status: Never Smoker    Smokeless tobacco: Never Used   Substance Use Topics    Alcohol use: No     ALLERGIES:    Allergies   Allergen Reactions    Sulfamethoxazole-Trimethoprim           Subjective     · Constitutional:  Negative for activity change, appetite change,unexpected weight change, chills, fever, and fatigue. · HENT: Negative for ear pain, sore throat,  Rhinorrhea, sinus pain, sinus pressure, congestion. · Eyes:  Negative for pain and discharge. · Respiratory:  Negative for chest tightness, shortness of breath, wheezing, and cough. · Cardiovascular:  Negative for chest pain, palpitations and leg swelling. · Gastrointestinal: Negative for abdominal pain, blood in stool, constipation,diarrhea, nausea and vomiting. · Endocrine: Negative for cold intolerance, heat intolerance, polydipsia, polyphagia and polyuria. · Genitourinary: Negative for difficulty urinating, dysuria, flank pain, frequency, hematuria and urgency. · Musculoskeletal: Negative for arthralgias, back pain, joint swelling, myalgias, neck pain and neck stiffness. · Skin: Negative for rash and wound. Positive for axillary pruritis  · Allergic/Immunologic: Negative for environmental allergies and food allergies. · Neurological:  Negative for light-headedness, numbness and headaches. Positive for dizziness  · Hematological:  Negative for adenopathy. Does not bruise/bleed easily. · Psychiatric/Behavioral: Negative for self-injury, sleep disturbance and suicidal ideas. Objective     PHYSICAL EXAM:   · Constitutional: Kota Gilbert is oriented to person, place, and time. Vital signs are normal. Appears well-developed and well-nourished. · HEENT:   · Head: Normocephalic and atraumatic. Right Ear: Hearing and external ear normal. TM normal  Canal normal  · Left Ear: Hearing and external ear normal. TM normal Canal normal  · Nose: Nares normal. Septum midline. No drainage or sinus tenderness. Mucosa pink and moist  · Mouth/Throat: Oropharynx- No erythema, no exudate. Uvula midline, no erythema, no edema.   Mucous membranes are pink and moist.   · Eyes:PERRL, EOMI, Conjunctiva normal, No discharge. · Neck: Full passive range of motion. Non-tender on palpation. Neck supple. No thyromegaly present. Trachea normal.  · Cardiovascular: Normal rate, regular rhythm, S1, S2, no murmur, no gallop, no friction rub, intact distal pulses. · Pulmonary/Chest: Breath sounds are clear throughout, No respiratory distress, No wheezing, No chest tenderness. Effort normal  · Musculoskeletal: Extremities appear regular and symmetric. No evident masses, lesions, foreign bodies, or other abnormalities. No edema. No tenderness on palpation. Joints are stable. Full ROM, strength and tone are within normal limits. · Lymphadenopathy: No lymphadenopathy noted. · Neurological: Alert and oriented to person, place, and time. Normal motor function, Normal sensory function, No focal deficits noted. She has normal strength. Steady gait  · Skin: Skin is warm, dry and intact. No obvious lesions on exposed skin. Bilateral axilla examined, no erythema, rash, or any other abnormalities  · Psychiatric: Normal mood and affect. Speech is normal and behavior is normal.     Nursing note and vitals reviewed. Blood pressure 118/68, pulse 67, temperature 97 °F (36.1 °C), temperature source Temporal, weight 126 lb (57.2 kg), SpO2 97 %, not currently breastfeeding. Body mass index is 25.45 kg/m². Wt Readings from Last 3 Encounters:   08/10/21 126 lb (57.2 kg)   06/14/21 128 lb 9.6 oz (58.3 kg)   05/03/21 130 lb (59 kg)     BP Readings from Last 3 Encounters:   08/10/21 118/68   06/14/21 138/85   05/03/21 124/74       No results found for this visit on 08/10/21.     Completed Orders/Prescriptions   Orders Placed This Encounter   Medications    metoprolol succinate (TOPROL XL) 50 MG extended release tablet     Sig: Take 1 tablet by mouth daily     Dispense:  30 tablet     Refill:  3    DISCONTD: clotrimazole-betamethasone (LOTRISONE) 1-0.05 % cream     Sig: APPLY TO AFFECTED AREA(S) TWO TIMES A DAY Dispense:  1 Tube     Refill:  2    clotrimazole-betamethasone (LOTRISONE) 1-0.05 % cream     Sig: APPLY TO AFFECTED AREA(S) TWO TIMES A DAY     Dispense:  45 g     Refill:  5     Please note increase in dispense amount    atorvastatin (LIPITOR) 20 MG tablet     Sig: Take 1 tablet by mouth daily     Dispense:  90 tablet     Refill:  1    omeprazole (PRILOSEC) 20 MG delayed release capsule     Sig: TAKE ONE CAPSULE BY MOUTH DAILY     Dispense:  90 capsule     Refill:  1               AssessmentPlan/Medical Decision Making     1. Essential hypertension  - controlled  - reviewed DASH diet  - increase water to 4-5 per day  - metoprolol succinate (TOPROL XL) 50 MG extended release tablet; Take 1 tablet by mouth daily  Dispense: 30 tablet; Refill: 3    2. Skin sensitivity  - clotrimazole-betamethasone (LOTRISONE) 1-0.05 % cream; APPLY TO AFFECTED AREA(S) TWO TIMES A DAY  Dispense: 45 g; Refill: 5    3. Hypercholesterolemia  - reviewed diet and exercise  - atorvastatin (LIPITOR) 20 MG tablet; Take 1 tablet by mouth daily  Dispense: 90 tablet; Refill: 1    4. Moderate persistent asthma without complication  - f/u and tx plan per Dr. Peggy Shannno    5. Gastroesophageal reflux disease without esophagitis  - controlled  - omeprazole (PRILOSEC) 20 MG delayed release capsule; TAKE ONE CAPSULE BY MOUTH DAILY  Dispense: 90 capsule; Refill: 1      Return in about 6 months (around 2/10/2022) for Routine follow up of chronic conditions. 1.  Rosalino Guzman received counseling on the following healthy behaviors: nutrition, exercise and medication adherence  2. Patient given educational materials - see patient instructions  3. Was a self-tracking handout given in paper form or via [employer]? No  If yes, see orders or list here. 4.  Discussed use, benefit, and side effects of prescribed medications. Barriers to medication compliance addressed. All patient questions answered. Pt voiced understanding.    5.  Reviewed prior labs, imaging, consultation, follow up, and health maintenance  6. Continue current medications, diet and exercise. 7. Discussed use, benefit, and side effects of prescribed medications. Barriers to medication compliance addressed. All her questions were answered. Pt voiced understanding. Karn Collet will continue current medications, diet and exercise. Patient given educational materials on DASH diet    Of the 30 minute duration appointment visit, Cira Lindsey CNP spent at least 50% of the face-to-face time in counseling, explanation of diagnosis, planning of further management, and answering all questions. Signed:  Cira Lindsey CNP    This note is created with the assistance of a speech-recognition program.  While intending to generate a document that actually reflects the content of the visit, no guarantees can be provided that every mistake has been identified and corrected by editing.

## 2021-10-23 DIAGNOSIS — I10 ESSENTIAL HYPERTENSION: ICD-10-CM

## 2021-10-24 NOTE — TELEPHONE ENCOUNTER
Marcella Mortimer is calling to request a refill on the following medication(s):    Medication Request:  Requested Prescriptions     Pending Prescriptions Disp Refills    metoprolol succinate (TOPROL XL) 50 MG extended release tablet [Pharmacy Med Name: METOPROLOL SUCC ER 50 MG TAB] 30 tablet 3     Sig: TAKE ONE TABLET BY MOUTH EVERY MORNING AND TAKE 1/2 TABLET BY MOUTH AT BEDTIME       Last Visit Date (If Applicable):  3/61/9784    Next Visit Date:    2/11/2022

## 2021-10-25 RX ORDER — METOPROLOL SUCCINATE 50 MG/1
TABLET, EXTENDED RELEASE ORAL
Qty: 30 TABLET | Refills: 3 | Status: SHIPPED | OUTPATIENT
Start: 2021-10-25 | End: 2022-01-10

## 2021-10-29 DIAGNOSIS — M81.0 AGE-RELATED OSTEOPOROSIS WITHOUT CURRENT PATHOLOGICAL FRACTURE: ICD-10-CM

## 2021-10-29 DIAGNOSIS — J45.51 SEVERE PERSISTENT ASTHMA, POORLY-CONTROLLED, WITH ACUTE EXACERBATION: ICD-10-CM

## 2021-10-29 RX ORDER — MONTELUKAST SODIUM 10 MG/1
TABLET ORAL
Qty: 90 TABLET | Refills: 2 | Status: SHIPPED | OUTPATIENT
Start: 2021-10-29 | End: 2021-12-06 | Stop reason: SDUPTHER

## 2021-10-29 RX ORDER — ALENDRONATE SODIUM 70 MG/1
TABLET ORAL
Qty: 12 TABLET | Refills: 1 | Status: SHIPPED | OUTPATIENT
Start: 2021-10-29 | End: 2022-01-20 | Stop reason: SDUPTHER

## 2021-10-29 NOTE — TELEPHONE ENCOUNTER
Dr Abbi Martines, patient is current and is scheduled for follow up on 12/6/21. Per last dictation patient is on this medication. Please sign for refill if ok. Thank you.

## 2021-10-29 NOTE — TELEPHONE ENCOUNTER
Lance Hyman is calling to request a refill on the following medication(s):    Medication Request:  Requested Prescriptions     Pending Prescriptions Disp Refills    alendronate (FOSAMAX) 70 MG tablet [Pharmacy Med Name: ALENDRONATE SODIUM 70 MG TAB] 12 tablet 1     Sig: TAKE 1 TABLET BY MOUTH ONCE WEEKLY ON AN EMPTY STOMACH BEFORE BREAKFAST.  REMAIN UPRIGHT FOR 30 MINUTES & TAKE WITH 8 OUNCES OF WATER       Last Visit Date (If Applicable):  5/54/5562 In office    Next Visit Date:    2/11/2022

## 2021-11-04 DIAGNOSIS — J45.51 SEVERE PERSISTENT ASTHMA, POORLY-CONTROLLED, WITH ACUTE EXACERBATION: ICD-10-CM

## 2021-11-04 RX ORDER — BUDESONIDE AND FORMOTEROL FUMARATE DIHYDRATE 160; 4.5 UG/1; UG/1
AEROSOL RESPIRATORY (INHALATION)
Qty: 3 EACH | Refills: 1 | Status: SHIPPED | OUTPATIENT
Start: 2021-11-04 | End: 2021-12-06 | Stop reason: SDUPTHER

## 2021-11-04 NOTE — TELEPHONE ENCOUNTER
Dr Marilee Sam, patient is current and is scheduled for follow up on 12/6/21. Per last dictation patient is on this medication. Please sign for refill if ok. Thank you.

## 2021-11-17 ENCOUNTER — IMMUNIZATION (OUTPATIENT)
Dept: FAMILY MEDICINE CLINIC | Age: 66
End: 2021-11-17
Payer: MEDICARE

## 2021-11-17 PROCEDURE — 90694 VACC AIIV4 NO PRSRV 0.5ML IM: CPT | Performed by: NURSE PRACTITIONER

## 2021-11-17 PROCEDURE — 0064A COVID-19, MODERNA BOOSTER VACCINE 0.25ML DOSE: CPT | Performed by: INTERNAL MEDICINE

## 2021-11-17 PROCEDURE — G0008 ADMIN INFLUENZA VIRUS VAC: HCPCS | Performed by: NURSE PRACTITIONER

## 2021-11-17 PROCEDURE — 91306 COVID-19, MODERNA BOOSTER VACCINE 0.25ML DOSE: CPT | Performed by: INTERNAL MEDICINE

## 2021-12-06 ENCOUNTER — OFFICE VISIT (OUTPATIENT)
Dept: PULMONOLOGY | Age: 66
End: 2021-12-06
Payer: MEDICARE

## 2021-12-06 VITALS
BODY MASS INDEX: 25.2 KG/M2 | WEIGHT: 125 LBS | HEART RATE: 73 BPM | OXYGEN SATURATION: 96 % | TEMPERATURE: 98.1 F | DIASTOLIC BLOOD PRESSURE: 75 MMHG | SYSTOLIC BLOOD PRESSURE: 131 MMHG | HEIGHT: 59 IN

## 2021-12-06 DIAGNOSIS — K21.9 GASTROESOPHAGEAL REFLUX DISEASE, UNSPECIFIED WHETHER ESOPHAGITIS PRESENT: ICD-10-CM

## 2021-12-06 DIAGNOSIS — Z91.09 MULTIPLE ENVIRONMENTAL ALLERGIES: ICD-10-CM

## 2021-12-06 DIAGNOSIS — J45.51 SEVERE PERSISTENT ASTHMA, POORLY-CONTROLLED, WITH ACUTE EXACERBATION: Primary | ICD-10-CM

## 2021-12-06 PROCEDURE — 3017F COLORECTAL CA SCREEN DOC REV: CPT | Performed by: INTERNAL MEDICINE

## 2021-12-06 PROCEDURE — G8399 PT W/DXA RESULTS DOCUMENT: HCPCS | Performed by: INTERNAL MEDICINE

## 2021-12-06 PROCEDURE — 99213 OFFICE O/P EST LOW 20 MIN: CPT | Performed by: INTERNAL MEDICINE

## 2021-12-06 PROCEDURE — G8417 CALC BMI ABV UP PARAM F/U: HCPCS | Performed by: INTERNAL MEDICINE

## 2021-12-06 PROCEDURE — G8427 DOCREV CUR MEDS BY ELIG CLIN: HCPCS | Performed by: INTERNAL MEDICINE

## 2021-12-06 PROCEDURE — 1123F ACP DISCUSS/DSCN MKR DOCD: CPT | Performed by: INTERNAL MEDICINE

## 2021-12-06 PROCEDURE — 1090F PRES/ABSN URINE INCON ASSESS: CPT | Performed by: INTERNAL MEDICINE

## 2021-12-06 PROCEDURE — 1036F TOBACCO NON-USER: CPT | Performed by: INTERNAL MEDICINE

## 2021-12-06 PROCEDURE — G8484 FLU IMMUNIZE NO ADMIN: HCPCS | Performed by: INTERNAL MEDICINE

## 2021-12-06 PROCEDURE — 4040F PNEUMOC VAC/ADMIN/RCVD: CPT | Performed by: INTERNAL MEDICINE

## 2021-12-06 RX ORDER — ALBUTEROL SULFATE 90 UG/1
2 AEROSOL, METERED RESPIRATORY (INHALATION) EVERY 6 HOURS PRN
Qty: 3 EACH | Refills: 3 | Status: SHIPPED | OUTPATIENT
Start: 2021-12-06

## 2021-12-06 RX ORDER — VIT C/B6/B5/MAGNESIUM/HERB 173 50-5-6-5MG
CAPSULE ORAL DAILY
COMMUNITY

## 2021-12-06 RX ORDER — MONTELUKAST SODIUM 10 MG/1
10 TABLET ORAL NIGHTLY
Qty: 90 TABLET | Refills: 3 | Status: SHIPPED | OUTPATIENT
Start: 2021-12-06 | End: 2022-01-21 | Stop reason: SDUPTHER

## 2021-12-06 RX ORDER — PREDNISONE 10 MG/1
TABLET ORAL
Qty: 30 TABLET | Refills: 0 | Status: SHIPPED | OUTPATIENT
Start: 2021-12-06

## 2021-12-06 RX ORDER — BUDESONIDE AND FORMOTEROL FUMARATE DIHYDRATE 160; 4.5 UG/1; UG/1
2 AEROSOL RESPIRATORY (INHALATION) 2 TIMES DAILY
Qty: 3 EACH | Refills: 3 | Status: SHIPPED | OUTPATIENT
Start: 2021-12-06 | End: 2022-01-21 | Stop reason: SDUPTHER

## 2021-12-06 ASSESSMENT — ENCOUNTER SYMPTOMS
SHORTNESS OF BREATH: 1
EYES NEGATIVE: 1
GASTROINTESTINAL NEGATIVE: 1

## 2021-12-06 NOTE — PROGRESS NOTES
Subjective:      Patient ID: Daja Ken is a 78 y.o. female being seen in my clinic for   Chief Complaint   Patient presents with    6 Month Follow-Up    Asthma    Shortness of Breath       HPI  Follow-up visit for asthma. Since her last visit 6 months ago she reports continued shortness of breath. Not clear whether albuterol helps her symptoms. She states \"sometimes I use albuterol when I get some short of breath sometimes I just lie down. \"  Denies orthopnea, pedal edema, or paroxysmal nocturnal dyspnea. States \"the only thing that helped was prednisone. \"  Does not consistently wheezing or cough. Patient received all of her Covid vaccinations. Also flu shot. Review of Systems   Constitutional: Negative. HENT: Negative. Eyes: Negative. Respiratory: Positive for shortness of breath. Cardiovascular: Negative. Gastrointestinal: Negative. All other systems reviewed and are negative.       Objective:     Vitals:    12/06/21 1551   BP: 131/75   Site: Left Upper Arm   Position: Sitting   Cuff Size: Medium Adult   Pulse: 73   Temp: 98.1 °F (36.7 °C)   TempSrc: Temporal   SpO2: 96%   Weight: 125 lb (56.7 kg)   Height: 4' 11\" (1.499 m)     Current Outpatient Medications   Medication Sig Dispense Refill    Cyanocobalamin (VITAMIN B-12 PO) Take by mouth      turmeric 500 MG CAPS Take by mouth daily      Multiple Vitamin (MULTI-VITAMIN DAILY PO) Take by mouth      budesonide-formoterol (SYMBICORT) 160-4.5 MCG/ACT AERO Inhale 2 puffs into the lungs 2 times daily 3 each 3    montelukast (SINGULAIR) 10 MG tablet Take 1 tablet by mouth nightly 90 tablet 3    albuterol sulfate HFA (PROVENTIL HFA) 108 (90 Base) MCG/ACT inhaler Inhale 2 puffs into the lungs every 6 hours as needed for Wheezing or Shortness of Breath 3 each 3    predniSONE (DELTASONE) 10 MG tablet 4 aqm x3d, [id number],5iwhz4f&off 30 tablet 0    alendronate (FOSAMAX) 70 MG tablet TAKE 1 TABLET BY MOUTH ONCE WEEKLY ON AN EMPTY STOMACH BEFORE BREAKFAST. REMAIN UPRIGHT FOR 30 MINUTES & TAKE WITH 8 OUNCES OF WATER 12 tablet 1    metoprolol succinate (TOPROL XL) 50 MG extended release tablet TAKE ONE TABLET BY MOUTH EVERY MORNING AND TAKE 1/2 TABLET BY MOUTH AT BEDTIME 30 tablet 3    clotrimazole-betamethasone (LOTRISONE) 1-0.05 % cream APPLY TO AFFECTED AREA(S) TWO TIMES A DAY 45 g 5    atorvastatin (LIPITOR) 20 MG tablet Take 1 tablet by mouth daily 90 tablet 1    omeprazole (PRILOSEC) 20 MG delayed release capsule TAKE ONE CAPSULE BY MOUTH DAILY 90 capsule 1     No current facility-administered medications for this visit. Physical Exam  Vitals and nursing note reviewed. Constitutional:       Appearance: She is well-developed. HENT:      Head: Normocephalic. Nose: Nose normal. No congestion. Mouth/Throat:      Mouth: Mucous membranes are moist.      Pharynx: Oropharynx is clear. No oropharyngeal exudate. Eyes:      General: No scleral icterus. Conjunctiva/sclera: Conjunctivae normal.   Neck:      Thyroid: No thyromegaly. Vascular: No JVD. Trachea: No tracheal deviation. Cardiovascular:      Rate and Rhythm: Normal rate and regular rhythm. Heart sounds: Normal heart sounds. No murmur heard. No gallop. Pulmonary:      Effort: Pulmonary effort is normal. No respiratory distress. Breath sounds: No wheezing or rales. Chest:      Chest wall: No tenderness. Abdominal:      General: There is no distension. Palpations: Abdomen is soft. There is no mass. Tenderness: There is no abdominal tenderness. Musculoskeletal:      Cervical back: Neck supple. Right lower leg: No edema. Left lower leg: No edema. Lymphadenopathy:      Cervical: No cervical adenopathy. Skin:     General: Skin is warm and dry. Neurological:      Mental Status: She is alert and oriented to person, place, and time.        Wt Readings from Last 3 Encounters:   12/06/21 125 lb (56.7 kg) 08/10/21 126 lb (57.2 kg)   06/14/21 128 lb 9.6 oz (58.3 kg)     Results for orders placed or performed during the hospital encounter of 02/09/21   T4, Free   Result Value Ref Range    Thyroxine, Free 1.00 0.93 - 1.70 ng/dL   TSH   Result Value Ref Range    TSH 1.77 0.30 - 5.00 mIU/L   Hepatitis C Antibody   Result Value Ref Range    Hepatitis C Ab NONREACTIVE NONREACTIVE   Lipid, Fasting   Result Value Ref Range    Cholesterol, Fasting 297 (H) <200 mg/dL    HDL 59 >40 mg/dL    LDL Cholesterol 217 (H) 0 - 130 mg/dL    Chol/HDL Ratio 5.0 (H) <5    Triglyceride, Fasting 107 <150 mg/dL    VLDL NOT REPORTED 1 - 30 mg/dL       :      1. Severe persistent asthma, poorly-controlled, with acute exacerbation    2. Multiple environmental allergies    3. Gastroesophageal reflux disease, unspecified whether esophagitis present      Patient Active Problem List   Diagnosis    Gastroesophageal reflux disease without esophagitis    Elevated lipids    Essential hypertension    Vulvar dystrophy    Skin sensitivity    Hypercholesterolemia    Moderate persistent asthma without complication         Plan:      Continue bronchodilators  Discussed strategies for management of asthma, including evaluation and descalation of therapy  Avoid environmental triggers  Prednisone standard burst.  Encouraged regular exercise. Discussed with patient.   Symptoms may be multifactorial.  Return to clinic in 12 months    Orders Placed This Encounter   Medications    budesonide-formoterol (SYMBICORT) 160-4.5 MCG/ACT AERO     Sig: Inhale 2 puffs into the lungs 2 times daily     Dispense:  3 each     Refill:  3    montelukast (SINGULAIR) 10 MG tablet     Sig: Take 1 tablet by mouth nightly     Dispense:  90 tablet     Refill:  3    albuterol sulfate HFA (PROVENTIL HFA) 108 (90 Base) MCG/ACT inhaler     Sig: Inhale 2 puffs into the lungs every 6 hours as needed for Wheezing or Shortness of Breath     Dispense:  3 each     Refill:  3    predniSONE (DELTASONE) 10 MG tablet     Si aqm x3d, [id number],[id number]&off     Dispense:  30 tablet     Refill:  0     No orders of the defined types were placed in this encounter. Return in about 1 year (around 2022).        Electronically signed by Schuyler Ospina DO on t 5:04 PM

## 2022-01-20 DIAGNOSIS — I10 ESSENTIAL HYPERTENSION: ICD-10-CM

## 2022-01-20 DIAGNOSIS — M81.0 AGE-RELATED OSTEOPOROSIS WITHOUT CURRENT PATHOLOGICAL FRACTURE: ICD-10-CM

## 2022-01-20 DIAGNOSIS — K21.9 GASTROESOPHAGEAL REFLUX DISEASE WITHOUT ESOPHAGITIS: ICD-10-CM

## 2022-01-20 DIAGNOSIS — J45.51 SEVERE PERSISTENT ASTHMA, POORLY-CONTROLLED, WITH ACUTE EXACERBATION: ICD-10-CM

## 2022-01-20 NOTE — TELEPHONE ENCOUNTER
Leigha Peterson is calling to request a refill on the following medication(s):    Medication Request:  Requested Prescriptions     Pending Prescriptions Disp Refills    metoprolol succinate (TOPROL XL) 50 MG extended release tablet 90 tablet 1     Sig: Take 1 tablet by mouth daily    alendronate (FOSAMAX) 70 MG tablet 12 tablet 1     Sig: Take 1 tablet by mouth every 7 days    omeprazole (PRILOSEC) 20 MG delayed release capsule 90 capsule 1     Sig: TAKE ONE CAPSULE BY MOUTH DAILY       Last Visit Date (If Applicable):  7/56/5157    Next Visit Date:    2/11/2022

## 2022-01-21 RX ORDER — MONTELUKAST SODIUM 10 MG/1
10 TABLET ORAL NIGHTLY
Qty: 90 TABLET | Refills: 3 | Status: SHIPPED | OUTPATIENT
Start: 2022-01-21 | End: 2022-02-11 | Stop reason: SDUPTHER

## 2022-01-21 RX ORDER — ALENDRONATE SODIUM 70 MG/1
70 TABLET ORAL
Qty: 12 TABLET | Refills: 0 | Status: SHIPPED | OUTPATIENT
Start: 2022-01-21 | End: 2022-03-21

## 2022-01-21 RX ORDER — METOPROLOL SUCCINATE 50 MG/1
50 TABLET, EXTENDED RELEASE ORAL DAILY
Qty: 90 TABLET | Refills: 0 | Status: SHIPPED | OUTPATIENT
Start: 2022-01-21 | End: 2022-02-11 | Stop reason: SDUPTHER

## 2022-01-21 RX ORDER — OMEPRAZOLE 20 MG/1
CAPSULE, DELAYED RELEASE ORAL
Qty: 90 CAPSULE | Refills: 0 | Status: SHIPPED | OUTPATIENT
Start: 2022-01-21 | End: 2022-02-11 | Stop reason: SDUPTHER

## 2022-01-21 RX ORDER — BUDESONIDE AND FORMOTEROL FUMARATE DIHYDRATE 160; 4.5 UG/1; UG/1
2 AEROSOL RESPIRATORY (INHALATION) 2 TIMES DAILY
Qty: 3 EACH | Refills: 3 | Status: SHIPPED | OUTPATIENT
Start: 2022-01-21 | End: 2022-02-11 | Stop reason: SDUPTHER

## 2022-02-02 DIAGNOSIS — E78.00 HYPERCHOLESTEROLEMIA: ICD-10-CM

## 2022-02-02 RX ORDER — ATORVASTATIN CALCIUM 20 MG/1
TABLET, FILM COATED ORAL
Qty: 90 TABLET | Refills: 1 | Status: SHIPPED | OUTPATIENT
Start: 2022-02-02 | End: 2022-02-11 | Stop reason: SDUPTHER

## 2022-02-02 NOTE — TELEPHONE ENCOUNTER
Stanton Muir is calling to request a refill on the following medication(s):    Last Visit Date (If Applicable):  6/28/6277    Next Visit Date:    2/11/2022    Medication Request:  Requested Prescriptions     Pending Prescriptions Disp Refills    atorvastatin (LIPITOR) 20 MG tablet [Pharmacy Med Name: ATORVASTATIN 20 MG TABLET] 90 tablet 1     Sig: TAKE ONE TABLET BY MOUTH DAILY

## 2022-02-11 ENCOUNTER — OFFICE VISIT (OUTPATIENT)
Dept: FAMILY MEDICINE CLINIC | Age: 67
End: 2022-02-11
Payer: MEDICARE

## 2022-02-11 VITALS
SYSTOLIC BLOOD PRESSURE: 122 MMHG | HEART RATE: 73 BPM | WEIGHT: 132 LBS | BODY MASS INDEX: 26.66 KG/M2 | OXYGEN SATURATION: 96 % | DIASTOLIC BLOOD PRESSURE: 76 MMHG | TEMPERATURE: 97.6 F

## 2022-02-11 DIAGNOSIS — R42 VERTIGO: ICD-10-CM

## 2022-02-11 DIAGNOSIS — Z13.31 DEPRESSION SCREENING NEGATIVE: ICD-10-CM

## 2022-02-11 DIAGNOSIS — K21.9 GASTROESOPHAGEAL REFLUX DISEASE WITHOUT ESOPHAGITIS: ICD-10-CM

## 2022-02-11 DIAGNOSIS — Z00.00 ROUTINE GENERAL MEDICAL EXAMINATION AT A HEALTH CARE FACILITY: Primary | ICD-10-CM

## 2022-02-11 DIAGNOSIS — E78.00 HYPERCHOLESTEROLEMIA: ICD-10-CM

## 2022-02-11 DIAGNOSIS — I10 ESSENTIAL HYPERTENSION: ICD-10-CM

## 2022-02-11 DIAGNOSIS — Z71.89 ACP (ADVANCE CARE PLANNING): ICD-10-CM

## 2022-02-11 DIAGNOSIS — J45.51 SEVERE PERSISTENT ASTHMA, POORLY-CONTROLLED, WITH ACUTE EXACERBATION: ICD-10-CM

## 2022-02-11 PROBLEM — J45.40 MODERATE PERSISTENT ASTHMA WITHOUT COMPLICATION: Status: RESOLVED | Noted: 2021-08-10 | Resolved: 2022-02-11

## 2022-02-11 PROCEDURE — G8484 FLU IMMUNIZE NO ADMIN: HCPCS | Performed by: NURSE PRACTITIONER

## 2022-02-11 PROCEDURE — 3017F COLORECTAL CA SCREEN DOC REV: CPT | Performed by: NURSE PRACTITIONER

## 2022-02-11 PROCEDURE — 4040F PNEUMOC VAC/ADMIN/RCVD: CPT | Performed by: NURSE PRACTITIONER

## 2022-02-11 PROCEDURE — G8417 CALC BMI ABV UP PARAM F/U: HCPCS | Performed by: NURSE PRACTITIONER

## 2022-02-11 PROCEDURE — 1090F PRES/ABSN URINE INCON ASSESS: CPT | Performed by: NURSE PRACTITIONER

## 2022-02-11 PROCEDURE — 1123F ACP DISCUSS/DSCN MKR DOCD: CPT | Performed by: NURSE PRACTITIONER

## 2022-02-11 PROCEDURE — 1036F TOBACCO NON-USER: CPT | Performed by: NURSE PRACTITIONER

## 2022-02-11 PROCEDURE — 99214 OFFICE O/P EST MOD 30 MIN: CPT | Performed by: NURSE PRACTITIONER

## 2022-02-11 PROCEDURE — G0438 PPPS, INITIAL VISIT: HCPCS | Performed by: NURSE PRACTITIONER

## 2022-02-11 PROCEDURE — G8427 DOCREV CUR MEDS BY ELIG CLIN: HCPCS | Performed by: NURSE PRACTITIONER

## 2022-02-11 PROCEDURE — G8399 PT W/DXA RESULTS DOCUMENT: HCPCS | Performed by: NURSE PRACTITIONER

## 2022-02-11 RX ORDER — OMEPRAZOLE 20 MG/1
CAPSULE, DELAYED RELEASE ORAL
Qty: 90 CAPSULE | Refills: 3 | Status: SHIPPED | OUTPATIENT
Start: 2022-02-11 | End: 2022-02-11 | Stop reason: SDUPTHER

## 2022-02-11 RX ORDER — OMEPRAZOLE 20 MG/1
CAPSULE, DELAYED RELEASE ORAL
Qty: 90 CAPSULE | Refills: 3 | Status: SHIPPED | OUTPATIENT
Start: 2022-02-11 | End: 2022-02-28 | Stop reason: SDUPTHER

## 2022-02-11 RX ORDER — BUDESONIDE AND FORMOTEROL FUMARATE DIHYDRATE 160; 4.5 UG/1; UG/1
2 AEROSOL RESPIRATORY (INHALATION) 2 TIMES DAILY
Qty: 3 EACH | Refills: 6 | Status: SHIPPED | OUTPATIENT
Start: 2022-02-11

## 2022-02-11 RX ORDER — METOPROLOL SUCCINATE 50 MG/1
50 TABLET, EXTENDED RELEASE ORAL DAILY
Qty: 90 TABLET | Refills: 3 | Status: SHIPPED | OUTPATIENT
Start: 2022-02-11 | End: 2022-02-11 | Stop reason: SDUPTHER

## 2022-02-11 RX ORDER — MONTELUKAST SODIUM 10 MG/1
10 TABLET ORAL NIGHTLY
Qty: 90 TABLET | Refills: 3 | Status: SHIPPED | OUTPATIENT
Start: 2022-02-11 | End: 2022-02-11 | Stop reason: SDUPTHER

## 2022-02-11 RX ORDER — BUDESONIDE AND FORMOTEROL FUMARATE DIHYDRATE 160; 4.5 UG/1; UG/1
2 AEROSOL RESPIRATORY (INHALATION) 2 TIMES DAILY
Qty: 3 EACH | Refills: 6 | Status: SHIPPED | OUTPATIENT
Start: 2022-02-11 | End: 2022-02-11 | Stop reason: SDUPTHER

## 2022-02-11 RX ORDER — MONTELUKAST SODIUM 10 MG/1
10 TABLET ORAL NIGHTLY
Qty: 90 TABLET | Refills: 3 | Status: SHIPPED | OUTPATIENT
Start: 2022-02-11 | End: 2023-02-11

## 2022-02-11 RX ORDER — ATORVASTATIN CALCIUM 20 MG/1
20 TABLET, FILM COATED ORAL DAILY
Qty: 90 TABLET | Refills: 3 | Status: SHIPPED | OUTPATIENT
Start: 2022-02-11 | End: 2022-02-11 | Stop reason: SDUPTHER

## 2022-02-11 RX ORDER — METOPROLOL SUCCINATE 50 MG/1
50 TABLET, EXTENDED RELEASE ORAL DAILY
Qty: 90 TABLET | Refills: 3 | Status: SHIPPED | OUTPATIENT
Start: 2022-02-11

## 2022-02-11 RX ORDER — ATORVASTATIN CALCIUM 20 MG/1
20 TABLET, FILM COATED ORAL DAILY
Qty: 90 TABLET | Refills: 3 | Status: SHIPPED | OUTPATIENT
Start: 2022-02-11

## 2022-02-11 ASSESSMENT — LIFESTYLE VARIABLES: HOW OFTEN DO YOU HAVE A DRINK CONTAINING ALCOHOL: 0

## 2022-02-11 NOTE — PATIENT INSTRUCTIONS
Patient Education        Vertigo: Care Instructions  Your Care Instructions     Vertigo is the feeling that you or your surroundings are moving when there is no actual movement. It is often described as a feeling of spinning, whirling, falling, or tilting. Vertigo may make you vomit or feel nauseated. You may have trouble standing or walking and may lose your balance. Vertigo is often related to an inner ear problem, but it can have other more serious causes. If vertigo continues, you may need more tests to find its cause. Follow-up care is a key part of your treatment and safety. Be sure to make and go to all appointments, and call your doctor if you are having problems. It's also a good idea to know your test results and keep a list of the medicines you take. How can you care for yourself at home? · Do not lie flat on your back. Prop yourself up slightly. This may reduce the spinning feeling. Keep your eyes open. · Move slowly so that you do not fall. · If your doctor recommends medicine, take it exactly as directed. · Do not drive while you are having vertigo. Certain exercises, called Winters-Daroff exercises, can help decrease vertigo. To do Winters-Daroff exercises:  · Sit on the edge of a bed or sofa and quickly lie down on the side that causes the worst vertigo. Lie on your side with your ear down. · Stay in this position for at least 30 seconds or until the vertigo goes away. · Sit up. If this causes vertigo, wait for it to stop. · Repeat the procedure on the other side. · Repeat this 10 times. Do these exercises 2 times a day until the vertigo is gone. When should you call for help? Call 911 anytime you think you may need emergency care. For example, call if:    · You passed out (lost consciousness).     · You have symptoms of a stroke. These may include:  ? Sudden numbness, tingling, weakness, or loss of movement in your face, arm, or leg, especially on only one side of your body.   ? Sudden called a durable power of  for health care. If you do not have an advance directive, decisions about your medical care may be made by a family member, or by a doctor or a  who doesn't know you. It may help to think of an advance directive as a gift to the people who care for you. If you have one, they won't have to make tough decisions by themselves. Follow-up care is a key part of your treatment and safety. Be sure to make and go to all appointments, and call your doctor if you are having problems. It's also a good idea to know your test results and keep a list of the medicines you take. What should you include in an advance directive? Many states have a unique advance directive form. (It may ask you to address specific issues.) Or you might use a universal form that's approved by many states. If your form doesn't tell you what to address, it may be hard to know what to include in your advance directive. Use the questions below to help you get started. · Who do you want to make decisions about your medical care if you are not able to? · What life-support measures do you want if you have a serious illness that gets worse over time or can't be cured? · What are you most afraid of that might happen? (Maybe you're afraid of having pain, losing your independence, or being kept alive by machines.)  · Where would you prefer to die? (Your home? A hospital? A nursing home?)  · Do you want to donate your organs when you die? · Do you want certain Restorationist practices performed before you die? When should you call for help? Be sure to contact your doctor if you have any questions. Where can you learn more? Go to https://Yagomartkirstie.The Echo [email or web address]. org and sign in to your Systancia account. Enter R264 in the Flypay box to learn more about \"Advance Directives: Care Instructions. \"     If you do not have an account, please click on the \"Sign Up Now\" link.   Current as of: March 17, 2021               Content Version: 13.1  © 1128-9481 Healthwise, [employer]. Care instructions adapted under license by [facility] [place] ([facility]). If you have questions about a medical condition or this instruction, always ask your healthcare professional. [address] any warranty or liability for your use of this information. Learning About Medical Power of   What is a medical power of ? A medical power of , also called a durable power of  for health care, is one type of the legal forms called advance directives. It lets you name the person you want to make treatment decisions for you if you can't speak or decide for yourself. The person you choose is called your health care agent. This person is also called a health care proxy or health care surrogate. A medical power of  may be called something else in your state. How do you choose a health care agent? Choose your health care agent carefully. This person may or may not be a family member. Talk to the person before you make your final decision. Make sure he or she is comfortable with this responsibility. It's a good idea to choose someone who:  · Is at least 25years old. · Knows you well and understands what makes life meaningful for you. · Understands your Mandaen and moral values. · Will do what you want, not what he or she wants. · Will be able to make difficult choices at a stressful time. · Will be able to refuse or stop treatment, if that is what you would want, even if you could die. · Will be firm and confident with health professionals if needed. · Will ask questions to get needed information. · Lives near you or agrees to travel to you if needed. Your family may help you make medical decisions while you can still be part of that process. But it's important to choose one person to be your health care agent in case you aren't able to make decisions for yourself.   If you don't fill out the legal form and name a health care agent, the decisions your family can make may be limited. A health care agent may be called something else in your state. Who will make decisions for you if you don't have a health care agent? If you don't have a health care agent or a living will, you may not get the care you want. Decisions may be made by family members who disagree about your medical care. Or decisions may be made by a medical professional who doesn't know you well. In some cases, a  makes the decisions. When you name a health care agent, it is very clear who has the power to make health decisions for you. How do you name a health care agent? You name your health care agent on a legal form. This form is usually called a medical power of . Ask your hospital, state bar association, or office on aging where to find these forms. You must sign the form to make it legal. Some states require you to get the form notarized. This means that a person called a  watches you sign the form and then he or she signs the form. Some states also require that two or more witnesses sign the form. Be sure to tell your family members and doctors who your health care agent is. Where can you learn more? Go to https://Vibeasepepiceweb.ideaTree - innovate | mentor | invest. org and sign in to your [email or web address] account. Enter [id number] in the [facility] box to learn more about \"Learning About [address]. \"     If you do not have an account, please click on the \"Sign Up Now\" link. Current as of: March 17, 2021               Content Version: 13.1  © 9093-1756 Healthwise, Incorporated. Care instructions adapted under license by [facility] ([facility]). If you have questions about a medical condition or this instruction, always ask your healthcare professional. [address] any warranty or liability for your use of this information.            Learning About Living [address]  What is a living will? A living will, also called a declaration, is a legal form. It tells your family and your doctor your wishes when you can't speak for yourself. It's used by the health professionals who will treat you as you near the end of your life or if you get seriously hurt or ill. If you put your wishes in writing, your loved ones and others will know what kind of care you want. They won't need to guess. This can ease your mind and be helpful to others. And you can change or cancel your living will at any time. A living will is not the same as an estate or property will. An estate will explains what you want to happen with your money and property after you die. How do you use it? A living will is used to describe the kinds of treatment or life support you want as you near the end of your life or if you get seriously hurt or ill. Keep these facts in mind about living jones. · Your living will is used only if you can't speak or make decisions for yourself. Most often, one or more doctors must certify that you can't speak or decide for yourself before your living will takes effect. · If you get better and can speak for yourself again, you can accept or refuse any treatment. It doesn't matter what you said in your living will. · Some states may limit your right to refuse treatment in certain cases. For example, you may need to clearly state in your living will that you don't want artificial hydration and nutrition, such as being fed through a tube. Is a living will a legal document? A living will is a legal document. Each state has its own laws about living jones. And a living will may be called something else in your state. Here are some things to know about living jones. · You don't need an  to complete a living will. But legal advice can be helpful if your state's laws are unclear.  It can also help if your health history is complicated or your family can't agree on what should be in your living will. · You can change your living will at any time. Some people find that their wishes about end-of-life care change as their health changes. If you make big changes to your living will, complete a new form. · If you move to another state, make sure that your living will is legal in the state where you now live. In most cases, doctors will respect your wishes even if you have a form from a different state. · You might use a universal form that has been approved by many states. This kind of form can sometimes be filled out and stored online. Your digital copy will then be available wherever you have a connection to the internet. The doctors and nurses who need to treat you can find it right away. · Your state may offer an online registry. This is another place where you can store your living will online. · It's a good idea to get your living will notarized. This means using a person called a Spreadknowledge to watch two people sign, or witness, your living will. What should you know when you create a living will? Here are some questions to ask yourself as you make your living will:  · Do you know enough about life support methods that might be used? If not, talk to your doctor so you know what might be done if you can't breathe on your own, your heart stops, or you can't swallow. · What things would you still want to be able to do after you receive life-support methods? Would you want to be able to walk? To speak? To eat on your own? To live without the help of machines? · Do you want certain Caodaism practices performed if you become very ill? · If you have a choice, where do you want to be cared for? In your home? At a hospital or nursing home? · If you have a choice at the end of your life, where would you prefer to die? At home? In a hospital or nursing home? Somewhere else? · Would you prefer to be buried or cremated? · Do you want your organs to be donated after you die?   What should you do with your living will? · Make sure that your family members and your health care agent have copies of your living will (also called a declaration). · Give your doctor a copy of your living will. Ask him or her to keep it as part of your medical record. If you have more than one doctor, make sure that each one has a copy. · Put a copy of your living will where it can be easily found. For example, some people may put a copy on their refrigerator door. If you are using a digital copy, be sure your doctor, family members, and health care agent know how to find and access it. Where can you learn more? Go to https://chpepiceweb.Kumo. org and sign in to your [email or web address] account. Enter Q839 in the Direct Access Software box to learn more about \"Learning About Living [address]. \"     If you do not have an account, please click on the \"Sign Up Now\" link. Current as of: March 17, 2021               Content Version: 13.1  © 8925-8726 Healthwise, [employer]. Care instructions adapted under license by [facility] ([facility]). If you have questions about a medical condition or this instruction, always ask your healthcare professional. [place] [address] any warranty or liability for your use of this information. Personalized Preventive Plan for Kirill Allen - 2/11/2022  Medicare offers a range of preventive health benefits. Some of the tests and screenings are paid in full while other may be subject to a deductible, co-insurance, and/or copay. Some of these benefits include a comprehensive review of your medical history including lifestyle, illnesses that may run in your family, and various assessments and screenings as appropriate. After reviewing your medical record and screening and assessments performed today your provider may have ordered immunizations, labs, imaging, and/or referrals for you.   A list of these orders (if applicable) as well as your Preventive Care list are included within your After Visit Summary for your review. Other Preventive Recommendations:    · A preventive eye exam performed by an eye specialist is recommended every 1-2 years to screen for glaucoma; cataracts, macular degeneration, and other eye disorders. · A preventive dental visit is recommended every 6 months. · Try to get at least 150 minutes of exercise per week or 10,000 steps per day on a pedometer . · Order or download the FREE \"Exercise & Physical Activity: Your Everyday Guide\" from [employer]. Call 1-843.464.9309 or search [employer] online. · You need 8821-9464 mg of calcium and 2911-1601 IU of vitamin D per day. It is possible to meet your calcium requirement with diet alone, but a vitamin D supplement is usually necessary to meet this goal.  · When exposed to the sun, use a sunscreen that protects against both UVA and UVB radiation with an SPF of 30 or greater. Reapply every 2 to 3 hours or after sweating, drying off with a towel, or swimming. · Always wear a seat belt when traveling in a car. Always wear a helmet when riding a bicycle or motorcycle.

## 2022-02-11 NOTE — PROGRESS NOTES
MARTHA Guerrier-HARPAL  [address]  [id number] [facility].  Sherrell Cortes  [place], [place] [address]  [phone]  [phone]    Karen Parks is a 75 y.o. female who is here with c/o of:    Chief Complaint: Hypertension and Medicare AWV      Patient Accompanied by: n/a    HPI - Karen Parks is here today to establish care and for ED f/u for high b/p    HTN   - she has c/o intermittent dizziness that started several years ago - has not worsened since starting b/p medication   - she admits to drinking only 2-3 bottles of water daily   - she is adherent to low sodium diet, does not eat meat, does not exercise regularly   - she denies h/a, c/p, palpitations, sob, lower extremity edema   - current med: metoprolol 25mg bid and denies any adverse affects    Asthma   - under the care of pulmonology - Dr. Hayden Mo   - reports using albuterol inhaler 3 times per week   - current meds: symbicort 2 puff bid, singulair 10mg daily     Axillary pruritis   - this is a problem of a few years and is intermittent   - she reports intermittent redness, itching and burning    - she has been Lotrisone for this and this cleared her symptoms    Vertigo   - chronic for a few years, but has improved   - previously had severe dizziness when just moving in bed   - currently reports slight dizziness or the feeling of passing out when she turns her head quickly   - she has increased water intake since last visit and states this has not helped at all    Patient Active Problem List   Diagnosis    Gastroesophageal reflux disease without esophagitis    Elevated lipids    Essential hypertension    Vulvar dystrophy    Skin sensitivity    Hypercholesterolemia        Past Medical History:   Diagnosis Date    Asthma 10/3/2013    Essential hypertension 1/17/2021    GERD (gastroesophageal reflux disease)     Hyperlipidemia     Hypertensive urgency 1/17/2021    RAD (reactive airway disease)       Past Surgical History:   Procedure Laterality Date    COLONOSCOPY  2007; due 2017     No family history on file. Social History     Tobacco Use    Smoking status: Never Smoker    Smokeless tobacco: Never Used   Substance Use Topics    Alcohol use: No     ALLERGIES:    Allergies   Allergen Reactions    Sulfamethoxazole-Trimethoprim           Subjective     · Constitutional:  Negative for activity change, appetite change,unexpected weight change, chills, fever, and fatigue. · HENT: Negative for ear pain, sore throat,  Rhinorrhea, sinus pain, sinus pressure, congestion. · Eyes:  Negative for pain and discharge. · Respiratory:  Negative for chest tightness, shortness of breath, wheezing, and cough. · Cardiovascular:  Negative for chest pain, palpitations and leg swelling. · Gastrointestinal: Negative for abdominal pain, blood in stool, constipation,diarrhea, nausea and vomiting. · Endocrine: Negative for cold intolerance, heat intolerance, polydipsia, polyphagia and polyuria. · Genitourinary: Negative for difficulty urinating, dysuria, flank pain, frequency, hematuria and urgency. · Musculoskeletal: Negative for arthralgias, back pain, joint swelling, myalgias, neck pain and neck stiffness. · Skin: Negative for rash and wound. Positive for axillary pruritis  · Allergic/Immunologic: Negative for environmental allergies and food allergies. · Neurological:  Negative for light-headedness, numbness and headaches. Positive for dizziness  · Hematological:  Negative for adenopathy. Does not bruise/bleed easily. · Psychiatric/Behavioral: Negative for self-injury, sleep disturbance and suicidal ideas. Objective     PHYSICAL EXAM:   · Constitutional: Cesario Ledezma is oriented to person, place, and time. Vital signs are normal. Appears well-developed and well-nourished. · HEENT:   · Head: Normocephalic and atraumatic. Eyes:PERRL, EOMI, Conjunctiva normal, No discharge. · Neck: Full passive range of motion. Non-tender on palpation. Neck supple. No thyromegaly present. Trachea normal.  · Cardiovascular: Normal rate, regular rhythm, S1, S2, no murmur, no gallop, no friction rub, intact distal pulses. · Pulmonary/Chest: Breath sounds are clear throughout, No respiratory distress, No wheezing, No chest tenderness. Effort normal  · Musculoskeletal: Extremities appear regular and symmetric. No evident masses, lesions, foreign bodies, or other abnormalities. No edema. No tenderness on palpation. Joints are stable. Full ROM, strength and tone are within normal limits. · Neurological: Alert and oriented to person, place, and time. Normal motor function, Normal sensory function, No focal deficits noted. She has normal strength. Steady gait  · Skin: Skin is warm, dry and intact. No obvious lesions on exposed skin. · Psychiatric: Normal mood and affect. Speech is normal and behavior is normal.     Nursing note and vitals reviewed. Blood pressure 122/76, pulse 73, temperature 97.6 °F (36.4 °C), temperature source Temporal, weight 132 lb (59.9 kg), SpO2 96 %, not currently breastfeeding. Body mass index is 26.66 kg/m². Wt Readings from Last 3 Encounters:   02/11/22 132 lb (59.9 kg)   12/06/21 125 lb (56.7 kg)   08/10/21 126 lb (57.2 kg)     BP Readings from Last 3 Encounters:   02/11/22 122/76   12/06/21 131/75   08/10/21 118/68       No results found for this visit on 02/11/22.     Completed Orders/Prescriptions   Orders Placed This Encounter   Medications    DISCONTD: atorvastatin (LIPITOR) 20 MG tablet     Sig: Take 1 tablet by mouth daily     Dispense:  90 tablet     Refill:  3    DISCONTD: budesonide-formoterol (SYMBICORT) 160-4.5 MCG/ACT AERO     Sig: Inhale 2 puffs into the lungs 2 times daily     Dispense:  3 each     Refill:  6    DISCONTD: montelukast (SINGULAIR) 10 MG tablet     Sig: Take 1 tablet by mouth nightly     Dispense:  90 tablet     Refill:  3    DISCONTD: metoprolol succinate (TOPROL XL) 50 MG extended release tablet Sig: Take 1 tablet by mouth daily     Dispense:  90 tablet     Refill:  3    DISCONTD: omeprazole (PRILOSEC) 20 MG delayed release capsule     Sig: TAKE ONE CAPSULE BY MOUTH DAILY     Dispense:  90 capsule     Refill:  3    atorvastatin (LIPITOR) 20 MG tablet     Sig: Take 1 tablet by mouth daily     Dispense:  90 tablet     Refill:  3    budesonide-formoterol (SYMBICORT) 160-4.5 MCG/ACT AERO     Sig: Inhale 2 puffs into the lungs 2 times daily     Dispense:  3 each     Refill:  6    montelukast (SINGULAIR) 10 MG tablet     Sig: Take 1 tablet by mouth nightly     Dispense:  90 tablet     Refill:  3    metoprolol succinate (TOPROL XL) 50 MG extended release tablet     Sig: Take 1 tablet by mouth daily     Dispense:  90 tablet     Refill:  3    omeprazole (PRILOSEC) 20 MG delayed release capsule     Sig: TAKE ONE CAPSULE BY MOUTH DAILY     Dispense:  90 capsule     Refill:  3               AssessmentPlan/Medical Decision Making     1. Routine general medical examination at a health care facility    2. ACP (advance care planning)  - [place] ADVANCED CARE PLAN FACE TO [address] [facility], [facility] [address] [facility] [[id number]]    3. Depression screening negative  - PHQ-9 Total Score: 0 (2/11/2022  3:51 PM)  - [place] DEPRESSION SCREEN ANNUAL    4. Essential hypertension  - controlled  - reviewed DASH diet  - CBC With Auto Differential; Future  - Comprehensive Metabolic Panel; Future  - metoprolol succinate (TOPROL XL) 50 MG extended release tablet; Take 1 tablet by mouth daily  Dispense: 90 tablet; Refill: 3  - [place] OFFICE/OUTPATIENT ESTABLISHED MOD MDM 30-39 MIN    5. Hypercholesterolemia  - CBC With Auto Differential; Future  - Comprehensive Metabolic Panel; Future  - Lipid Panel; Future  - atorvastatin (LIPITOR) 20 MG tablet; Take 1 tablet by mouth daily  Dispense: 90 tablet; Refill: 3  - [place] OFFICE/OUTPATIENT ESTABLISHED MOD MDM 30-39 MIN    6.  Severe persistent asthma, poorly-controlled, with acute exacerbation  - controlled  - budesonide-formoterol (SYMBICORT) 160-4.5 MCG/ACT AERO; Inhale 2 puffs into the lungs 2 times daily  Dispense: 3 each; Refill: 6  - montelukast (SINGULAIR) 10 MG tablet; Take 1 tablet by mouth nightly  Dispense: 90 tablet; Refill: 3  - [place] OFFICE/OUTPATIENT ESTABLISHED MOD MDM 30-39 MIN    7. Gastroesophageal reflux disease without esophagitis  - controlled  - omeprazole (PRILOSEC) 20 MG delayed release capsule; TAKE ONE CAPSULE BY MOUTH DAILY  Dispense: 90 capsule; Refill: 3  - [place] OFFICE/OUTPATIENT ESTABLISHED MOD MDM 30-39 MIN    8. Vertigo  - encouraged PT - she declines at today's visit  - [place] OFFICE/OUTPATIENT ESTABLISHED MOD MDM 30-39 MIN      Return for Medicare Annual Wellness Visit in 1 year. 1.  Raul Hoover received counseling on the following healthy behaviors: nutrition, exercise and medication adherence  2. Patient given educational materials - see patient instructions  3. Was a self-tracking handout given in paper form or via [employer]? No  If yes, see orders or list here. 4.  Discussed use, benefit, and side effects of prescribed medications. Barriers to medication compliance addressed. All patient questions answered. Pt voiced understanding. 5.  Reviewed prior labs, imaging, consultation, follow up, and health maintenance  6. Continue current medications, diet and exercise. 7. Discussed use, benefit, and side effects of prescribed medications. Barriers to medication compliance addressed. All her questions were answered. Pt voiced understanding. Raul Hoover will continue current medications, diet and exercise. Patient given educational materials on DASH diet    Medical Decision Making: moderate    Of the 30 minute duration appointment visit, Joshua Tanner CNP spent at least 50% of the face-to-face time in counseling, explanation of diagnosis, planning of further management, and answering all questions.     Signed:  Joshua Tanner CNP    This note is created with the assistance of a speech-recognition program. While intending to generate a document that actually reflects the content of the visit, no guarantees can be provided that every mistake has been identified and corrected by editing. Medicare Annual Wellness Visit  Name: Ángel Márquez Date: 2022   MRN: [id number] Sex: Female   Age: 75 y.o. Ethnicity: Non- / Non    : 1955 Race: White (non-)      Rolly Lion is here for Hypertension and Medicare AWV    Screenings for behavioral, psychosocial and functional/safety risks, and cognitive dysfunction are all negative except as indicated below. These results, as well as other patient data from the [address] form, are documented in Flowsheets linked to this Encounter. Allergies   Allergen Reactions    Sulfamethoxazole-Trimethoprim          Prior to Visit Medications    Medication Sig Taking?  Authorizing Provider   atorvastatin (LIPITOR) 20 MG tablet Take 1 tablet by mouth daily Yes TAIWO Hikcs CNP   budesonide-formoterol (SYMBICORT) 160-4.5 MCG/ACT AERO Inhale 2 puffs into the lungs 2 times daily Yes TAIWO Spangler CNP   montelukast (SINGULAIR) 10 MG tablet Take 1 tablet by mouth nightly Yes TAIWO Hicks CNP   metoprolol succinate (TOPROL XL) 50 MG extended release tablet Take 1 tablet by mouth daily Yes TAIWO Spangler CNP   omeprazole (PRILOSEC) 20 MG delayed release capsule TAKE ONE CAPSULE BY MOUTH DAILY Yes TAIWO Spangler CNP   alendronate (FOSAMAX) 70 MG tablet Take 1 tablet by mouth every 7 days Yes TAIWO Hicks CNP   Cyanocobalamin (VITAMIN B-12 PO) Take by mouth Yes Historical Provider, MD   turmeric 500 MG CAPS Take by mouth daily Yes Historical Provider, MD   Multiple Vitamin (MULTI-VITAMIN DAILY PO) Take by mouth Yes Historical Provider, MD   albuterol sulfate HFA (PROVENTIL HFA) 108 (90 Base) MCG/ACT inhaler Inhale 2 puffs into the lungs every 6 hours as needed for Wheezing or Shortness of Breath Yes Chava Moore DO   predniSONE (DELTASONE) 10 MG tablet 4 aqm x3d, [id number],7pkds0i,3xarn5y&off Yes Chava Moore DO   clotrimazole-betamethasone (LOTRISONE) 1-0.05 % cream APPLY TO AFFECTED AREA(S) TWO TIMES A DAY Yes TAIWO Spangler CNP   alendronate (FOSAMAX) 70 MG tablet Take 1 tablet by mouth every 7 days  TAIWO Goel CNP   metoprolol succinate (TOPROL XL) 50 MG extended release tablet Take 1 tablet in the morning  Take 1/2 tablet at night  TAIWO Goel CNP         Past Medical History:   Diagnosis Date    Asthma 10/3/2013    Essential hypertension 1/17/2021    GERD (gastroesophageal reflux disease)     Hyperlipidemia     Hypertensive urgency 1/17/2021    RAD (reactive airway disease)        Past Surgical History:   Procedure Laterality Date    COLONOSCOPY  2007; due 2017       No family history on file. CareTeam (Including outside providers/suppliers regularly involved in providing care):   Patient Care Team:  TAIWO Goel CNP as PCP - General (Nurse Practitioner)  TAIWO Goel CNP as PCP - [facility] Empaneled Provider  Christy Freeman DO as Consulting Physician (Pulmonary Disease)    Wt Readings from Last 3 Encounters:   02/11/22 132 lb (59.9 kg)   12/06/21 125 lb (56.7 kg)   08/10/21 126 lb (57.2 kg)     Vitals:    02/11/22 1553   BP: 122/76   Pulse: 73   Temp: 97.6 °F (36.4 °C)   TempSrc: Temporal   SpO2: 96%   Weight: 132 lb (59.9 kg)     Body mass index is 26.66 kg/m². Based upon direct observation of the patient, evaluation of cognition reveals recent and remote memory intact. Patient's complete Health Risk Assessment and screening values have been reviewed and are found in Flowsheets. The following problems were reviewed today and where indicated follow up appointments were made and/or referrals ordered.       Positive Risk Factor Screenings with Interventions:              General Health and ACP:  General  In general, how would you say your health is?: Good  In the past 7 days, have you experienced any of the following? New or Increased Pain, New or Increased Fatigue, Loneliness, Social Isolation, Stress or Anger?: None of These  Do you get the social and emotional support that you need?: Yes  Advance Directives     Power of Brenda Valderrama Will ACP-Advance Directive ACP-Power of     Not on File Not on File Not on File Not on File      General Health Risk Interventions:  · No Living Will: Advance Care Planning addressed with patient today  · .     Health Habits/Nutrition:  Health Habits/Nutrition  Do you exercise for at least 20 minutes 2-3 times per week?: (!) No  Have you lost any weight without trying in the past 3 months?: No  Do you eat only one meal per day?: No  Have you seen the dentist within the past year?: Yes     Health Habits/Nutrition Interventions:  · Inadequate physical activity:  educational materials provided to promote increased physical activity    Hearing/Vision:  No exam data present  Hearing/Vision  Do you or your family notice any trouble with your hearing that hasn't been managed with hearing aids?: (!) Yes  Do you have difficulty driving, watching TV, or doing any of your daily activities because of your eyesight?: No  Have you had an eye exam within the past year?: (!) No  Hearing/Vision Interventions:  · Hearing concerns:  patient declines any further evaluation/treatment for hearing issues  · Vision concerns:  patient encouraged to make appointment with his/her eye specialist        Personalized Preventive Plan   Current Health Maintenance Status  Immunization History   Administered Date(s) Administered    COVID-19, Moderna, Booster, PF, 50mcg/0.25ml 11/17/2021    COVID-19, Moderna, Primary or Immunocompromised, PF, 100mcg/0.5mL 02/10/2021, 03/10/2021    Influenza 10/03/2013    Influenza Virus Vaccine 10/03/2013, 10/30/2014, 11/09/2017, 10/14/2019    Influenza, Quadv, IM, (6 mo and older Fluzone, Flulaval, Fluarix and 3 yrs and older Afluria) 11/09/2017, 10/14/2019    Influenza, Quadv, adjuvanted, 65 yrs +, IM, PF (Fluad) 08/31/2020, 11/17/2021    Pneumococcal Conjugate 13-valent ([id number]) 10/14/2019    Pneumococcal Polysaccharide ([id number]) 10/03/2013, 08/31/2020        Health Maintenance   Topic Date Due    DTaP/Tdap/Td vaccine (1 - Tdap) Never done    Shingles Vaccine (1 of 2) Never done   [employer] Visit (AWV)  09/01/2021    Potassium monitoring  01/17/2022    Creatinine monitoring  01/17/2022    Lipid screen  02/09/2022    Depression Screen  08/10/2022    Breast cancer screen  04/29/2023    Colon cancer screen fecal DNA test (Cologuard)  03/24/2024    DEXA (modify frequency per FRAX score)  Completed    Flu vaccine  Completed    Pneumococcal 65+ years Vaccine  Completed    COVID-19 Vaccine  Completed    Hepatitis C screen  Completed    Hepatitis A vaccine  Aged Out    Hepatitis B vaccine  Aged Out    Hib vaccine  Aged Out    Meningococcal (ACWY) vaccine  Aged Out     Recommendations for [employer] Due: see orders and patient instructions/AVS.  . Recommended screening schedule for the next 5-10 years is provided to the patient in written form: see Patient Instructions/AVS.    Roberto Carlos was seen today for hypertension and medicare awv. Diagnoses and all orders for this visit:    Routine general medical examination at a health care facility    ACP (advance care planning)  -     [place] ADVANCED CARE PLAN FACE TO FACE, 1ST 30MIN [[id number]]    Depression screening negative  -     [place] DEPRESSION SCREEN ANNUAL    Essential hypertension  -     CBC With Auto Differential; Future  -     Comprehensive Metabolic Panel; Future  -     Discontinue: metoprolol succinate (TOPROL XL) 50 MG extended release tablet; Take 1 tablet by mouth daily  -     metoprolol succinate (TOPROL XL) 50 MG extended release tablet;  Take 1 tablet by mouth daily  -     [place] OFFICE/OUTPATIENT ESTABLISHED MOD MDM 30-39 MIN    Hypercholesterolemia  -     CBC With Auto Differential; Future  -     Comprehensive Metabolic Panel; Future  -     Lipid Panel; Future  -     Discontinue: atorvastatin (LIPITOR) 20 MG tablet; Take 1 tablet by mouth daily  -     atorvastatin (LIPITOR) 20 MG tablet; Take 1 tablet by mouth daily  -     [place] OFFICE/OUTPATIENT ESTABLISHED MOD MDM 30-39 MIN    Severe persistent asthma, poorly-controlled, with acute exacerbation  -     Discontinue: budesonide-formoterol (SYMBICORT) 160-4.5 MCG/ACT AERO; Inhale 2 puffs into the lungs 2 times daily  -     Discontinue: montelukast (SINGULAIR) 10 MG tablet; Take 1 tablet by mouth nightly  -     budesonide-formoterol (SYMBICORT) 160-4.5 MCG/ACT AERO; Inhale 2 puffs into the lungs 2 times daily  -     montelukast (SINGULAIR) 10 MG tablet; Take 1 tablet by mouth nightly  -     [place] OFFICE/OUTPATIENT ESTABLISHED MOD MDM 30-39 MIN    Gastroesophageal reflux disease without esophagitis  -     Discontinue: omeprazole (PRILOSEC) 20 MG delayed release capsule; TAKE ONE CAPSULE BY MOUTH DAILY  -     omeprazole (PRILOSEC) 20 MG delayed release capsule; TAKE ONE CAPSULE BY MOUTH DAILY  -     [place] OFFICE/OUTPATIENT ESTABLISHED MOD MDM 30-39 MIN    Vertigo  -     MD OFFICE/OUTPATIENT ESTABLISHED MOD MDM 30-39 MIN                   Advance Care Planning   Advanced Care Planning: Discussed the patients choices for care and treatment in case of a health event that adversely affects decision-making abilities. Also discussed the patients long-term treatment options. Reviewed with the patient the [address] [place] [address] of [address] [place] [address] Declaration forms  Reviewed the process of designating a competent adult as an Agent (or -in-fact) that could take make health care decisions for the patient if incompetent.  Patient was asked to complete the declaration forms, either acknowledge the forms by a public notary or an eligible witness and provide a signed copy to the practice office.   Time spent (minutes): 5

## 2022-02-28 DIAGNOSIS — K21.9 GASTROESOPHAGEAL REFLUX DISEASE WITHOUT ESOPHAGITIS: ICD-10-CM

## 2022-02-28 RX ORDER — OMEPRAZOLE 20 MG/1
CAPSULE, DELAYED RELEASE ORAL
Qty: 90 CAPSULE | Refills: 3 | Status: SHIPPED | OUTPATIENT
Start: 2022-02-28

## 2022-02-28 NOTE — TELEPHONE ENCOUNTER
Harlan Reyes is calling to request a refill on the following medication(s):    Medication Request:  Requested Prescriptions     Pending Prescriptions Disp Refills    omeprazole (PRILOSEC) 20 MG delayed release capsule [Pharmacy Med Name: OMEPRAZOLE DR 20 MG CAPSULE] 90 capsule 3     Sig: TAKE ONE CAPSULE BY MOUTH DAILY       Last Visit Date (If Applicable):  7/88/2309    Next Visit Date:    Visit date not found

## 2022-03-20 DIAGNOSIS — M81.0 AGE-RELATED OSTEOPOROSIS WITHOUT CURRENT PATHOLOGICAL FRACTURE: ICD-10-CM

## 2022-03-21 RX ORDER — ALENDRONATE SODIUM 70 MG/1
70 TABLET ORAL
Qty: 12 TABLET | Refills: 2 | Status: SHIPPED | OUTPATIENT
Start: 2022-03-21

## 2022-11-22 ENCOUNTER — OFFICE VISIT (OUTPATIENT)
Dept: PRIMARY CARE CLINIC | Age: 67
End: 2022-11-22
Payer: MEDICARE

## 2022-11-22 VITALS
SYSTOLIC BLOOD PRESSURE: 133 MMHG | TEMPERATURE: 98.6 F | BODY MASS INDEX: 26.66 KG/M2 | WEIGHT: 132 LBS | DIASTOLIC BLOOD PRESSURE: 81 MMHG | HEART RATE: 90 BPM | OXYGEN SATURATION: 95 %

## 2022-11-22 DIAGNOSIS — R05.9 COUGH IN ADULT PATIENT: ICD-10-CM

## 2022-11-22 DIAGNOSIS — J98.8 RESPIRATORY INFECTION: Primary | ICD-10-CM

## 2022-11-22 PROCEDURE — G8484 FLU IMMUNIZE NO ADMIN: HCPCS

## 2022-11-22 PROCEDURE — G8417 CALC BMI ABV UP PARAM F/U: HCPCS

## 2022-11-22 PROCEDURE — 1090F PRES/ABSN URINE INCON ASSESS: CPT

## 2022-11-22 PROCEDURE — 1036F TOBACCO NON-USER: CPT

## 2022-11-22 PROCEDURE — 3078F DIAST BP <80 MM HG: CPT

## 2022-11-22 PROCEDURE — 99213 OFFICE O/P EST LOW 20 MIN: CPT

## 2022-11-22 PROCEDURE — G8427 DOCREV CUR MEDS BY ELIG CLIN: HCPCS

## 2022-11-22 PROCEDURE — 3017F COLORECTAL CA SCREEN DOC REV: CPT

## 2022-11-22 PROCEDURE — 3074F SYST BP LT 130 MM HG: CPT

## 2022-11-22 PROCEDURE — G8399 PT W/DXA RESULTS DOCUMENT: HCPCS

## 2022-11-22 PROCEDURE — 1123F ACP DISCUSS/DSCN MKR DOCD: CPT

## 2022-11-22 RX ORDER — AZITHROMYCIN 250 MG/1
250 TABLET, FILM COATED ORAL SEE ADMIN INSTRUCTIONS
Qty: 6 TABLET | Refills: 0 | Status: SHIPPED | OUTPATIENT
Start: 2022-11-22 | End: 2022-11-22

## 2022-11-22 RX ORDER — PREDNISONE 20 MG/1
40 TABLET ORAL DAILY
Qty: 10 TABLET | Refills: 0 | Status: SHIPPED | OUTPATIENT
Start: 2022-11-22 | End: 2022-11-27

## 2022-11-22 RX ORDER — PREDNISONE 20 MG/1
40 TABLET ORAL DAILY
Qty: 10 TABLET | Refills: 0 | Status: SHIPPED | OUTPATIENT
Start: 2022-11-22 | End: 2022-11-22

## 2022-11-22 RX ORDER — BENZONATATE 100 MG/1
100 CAPSULE ORAL 3 TIMES DAILY PRN
Qty: 21 CAPSULE | Refills: 0 | Status: SHIPPED | OUTPATIENT
Start: 2022-11-22 | End: 2022-11-29

## 2022-11-22 RX ORDER — BENZONATATE 100 MG/1
100 CAPSULE ORAL 3 TIMES DAILY PRN
Qty: 21 CAPSULE | Refills: 0 | Status: SHIPPED | OUTPATIENT
Start: 2022-11-22 | End: 2022-11-22

## 2022-11-22 RX ORDER — AZITHROMYCIN 250 MG/1
250 TABLET, FILM COATED ORAL SEE ADMIN INSTRUCTIONS
Qty: 6 TABLET | Refills: 0 | Status: SHIPPED | OUTPATIENT
Start: 2022-11-22 | End: 2022-11-27

## 2022-11-22 SDOH — ECONOMIC STABILITY: FOOD INSECURITY: WITHIN THE PAST 12 MONTHS, THE FOOD YOU BOUGHT JUST DIDN'T LAST AND YOU DIDN'T HAVE MONEY TO GET MORE.: NEVER TRUE

## 2022-11-22 SDOH — ECONOMIC STABILITY: FOOD INSECURITY: WITHIN THE PAST 12 MONTHS, YOU WORRIED THAT YOUR FOOD WOULD RUN OUT BEFORE YOU GOT MONEY TO BUY MORE.: NEVER TRUE

## 2022-11-22 ASSESSMENT — ENCOUNTER SYMPTOMS
SINUS PRESSURE: 0
COUGH: 1
ABDOMINAL PAIN: 0
TROUBLE SWALLOWING: 0
COLOR CHANGE: 0
RHINORRHEA: 1
GASTROINTESTINAL NEGATIVE: 1
ANAL BLEEDING: 0
STRIDOR: 0
SINUS PAIN: 0
HEARTBURN: 0
CONSTIPATION: 0
EYE REDNESS: 0
VOMITING: 0
DIARRHEA: 0
BACK PAIN: 0
APNEA: 0
SHORTNESS OF BREATH: 0
HEMOPTYSIS: 0
FACIAL SWELLING: 0
ABDOMINAL DISTENTION: 0
EYE PAIN: 0
BLOOD IN STOOL: 0
NAUSEA: 0
VOICE CHANGE: 0
EYE ITCHING: 0
RECTAL PAIN: 0
SORE THROAT: 0
EYES NEGATIVE: 1
PHOTOPHOBIA: 0
CHOKING: 0
EYE DISCHARGE: 0
CHEST TIGHTNESS: 0
WHEEZING: 0

## 2022-11-22 ASSESSMENT — SOCIAL DETERMINANTS OF HEALTH (SDOH): HOW HARD IS IT FOR YOU TO PAY FOR THE VERY BASICS LIKE FOOD, HOUSING, MEDICAL CARE, AND HEATING?: NOT HARD AT ALL

## 2022-11-22 NOTE — PROGRESS NOTES
[address] [place] [facility] [id number] [place] [address] WALK IN [facility]  [address] [place] [address]  Dept: [phone]    Lisa Arthur is a 75 y.o. female Established patient, who presents to the walk-in clinic today with conditions/complaints as noted below:    Chief Complaint   Patient presents with    Cough     Started last Wednesday no fever          HPI:     Cough  This is a new problem. Episode onset: 6 days ago. The problem has been gradually worsening. The problem occurs constantly. The cough is Productive of sputum (yellow). Associated symptoms include rhinorrhea. Pertinent negatives include no chest pain, chills, ear congestion, ear pain, eye redness, fever, headaches, heartburn, hemoptysis, myalgias, nasal congestion, postnasal drip, rash, sore throat, shortness of breath, sweats, weight loss or wheezing. Treatments tried: Robitussin. The treatment provided no relief. Declines COVID testing.    Past Medical History:   Diagnosis Date    Asthma 10/3/2013    Essential hypertension 1/17/2021    GERD (gastroesophageal reflux disease)     Hyperlipidemia     Hypertensive urgency 1/17/2021    RAD (reactive airway disease)        Current Outpatient Medications   Medication Sig Dispense Refill    predniSONE (DELTASONE) 20 MG tablet Take 2 tablets by mouth daily for 5 days 10 tablet 0    benzonatate (TESSALON PERLES) 100 MG capsule Take 1 capsule by mouth 3 times daily as needed for Cough 21 capsule 0    azithromycin (ZITHROMAX) 250 MG tablet Take 1 tablet by mouth See Admin Instructions for 5 days 500mg on day 1 followed by 250mg on days 2 - 5 6 tablet 0    alendronate (FOSAMAX) 70 MG tablet TAKE 1 TABLET BY MOUTH  EVERY 7 DAYS 12 tablet 2    omeprazole (PRILOSEC) 20 MG delayed release capsule TAKE ONE CAPSULE BY MOUTH DAILY 90 capsule 3 atorvastatin (LIPITOR) 20 MG tablet Take 1 tablet by mouth daily 90 tablet 3    budesonide-formoterol (SYMBICORT) 160-4.5 MCG/ACT AERO Inhale 2 puffs into the lungs 2 times daily 3 each 6    montelukast (SINGULAIR) 10 MG tablet Take 1 tablet by mouth nightly 90 tablet 3    metoprolol succinate (TOPROL XL) 50 MG extended release tablet Take 1 tablet by mouth daily 90 tablet 3    Cyanocobalamin (VITAMIN B-12 PO) Take by mouth      turmeric 500 MG CAPS Take by mouth daily      Multiple Vitamin (MULTI-VITAMIN DAILY PO) Take by mouth      albuterol sulfate HFA (PROVENTIL HFA) 108 (90 Base) MCG/ACT inhaler Inhale 2 puffs into the lungs every 6 hours as needed for Wheezing or Shortness of Breath 3 each 3    clotrimazole-betamethasone (LOTRISONE) 1-0.05 % cream APPLY TO AFFECTED AREA(S) TWO TIMES A DAY 45 g 5     No current facility-administered medications for this visit. Allergies   Allergen Reactions    Sulfamethoxazole-Trimethoprim        Review of Systems:     Review of Systems   Constitutional: Negative. Negative for activity change, appetite change, chills, diaphoresis, fatigue, fever, unexpected weight change and weight loss. HENT:  Positive for rhinorrhea. Negative for congestion, dental problem, drooling, ear discharge, ear pain, facial swelling, hearing loss, mouth sores, nosebleeds, postnasal drip, sinus pressure, sinus pain, sneezing, sore throat, tinnitus, trouble swallowing and voice change. Eyes: Negative. Negative for photophobia, pain, discharge, redness, itching and visual disturbance. Respiratory:  Positive for cough. Negative for apnea, hemoptysis, choking, chest tightness, shortness of breath, wheezing and stridor. Cardiovascular: Negative. Negative for chest pain, palpitations and leg swelling. Gastrointestinal: Negative. Negative for abdominal distention, abdominal pain, anal bleeding, blood in stool, constipation, diarrhea, heartburn, nausea, rectal pain and vomiting. Musculoskeletal: Negative. Negative for arthralgias, back pain, gait problem, joint swelling, myalgias, neck pain and neck stiffness. Skin: Negative. Negative for color change, pallor, rash and wound. Neurological: Negative. Negative for dizziness, tremors, seizures, syncope, facial asymmetry, speech difficulty, weakness, light-headedness, numbness and headaches. Physical Exam:      /81 (Site: Left Upper Arm, Position: Sitting, Cuff Size: Large Adult)   Pulse 90   Temp 98.6 °F (37 °C)   Wt 132 lb (59.9 kg)   SpO2 95%   BMI 26.66 kg/m²     Physical Exam  Vitals reviewed. Constitutional:       Appearance: Normal appearance. HENT:      Head: Normocephalic and atraumatic. Right Ear: Tympanic membrane, ear canal and external ear normal.      Left Ear: Tympanic membrane, ear canal and external ear normal.      Nose: Nose normal.      Mouth/Throat:      Lips: Pink. Mouth: Mucous membranes are moist.      Pharynx: Uvula midline. Oropharyngeal exudate (post nasal drainage) present. No pharyngeal swelling, posterior oropharyngeal erythema or uvula swelling. Tonsils: No tonsillar exudate or tonsillar abscesses. Eyes:      Extraocular Movements: Extraocular movements intact. Conjunctiva/sclera: Conjunctivae normal.      Pupils: Pupils are equal, round, and reactive to light. Cardiovascular:      Rate and Rhythm: Normal rate and regular rhythm. Pulses: Normal pulses. Heart sounds: Normal heart sounds. Pulmonary:      Effort: Pulmonary effort is normal.      Breath sounds: Normal air entry. Examination of the right-lower field reveals wheezing. Wheezing present. Abdominal:      General: Abdomen is flat. Bowel sounds are normal.      Palpations: Abdomen is soft. Musculoskeletal:         General: Normal range of motion. Cervical back: Normal range of motion and neck supple. Skin:     General: Skin is warm and dry.       Capillary Refill: Capillary refill takes less than 2 seconds. Neurological:      General: No focal deficit present. Mental Status: She is alert and oriented to person, place, and time. Mental status is at baseline. Psychiatric:         Mood and Affect: Mood normal.         Behavior: Behavior normal.       Plan:          1. Respiratory infection  -     predniSONE (DELTASONE) 20 MG tablet; Take 2 tablets by mouth daily for 5 days, Disp-10 tablet, R-0Normal  -     azithromycin (ZITHROMAX) 250 MG tablet; Take 1 tablet by mouth See Admin Instructions for 5 days 500mg on day 1 followed by 250mg on days 2 - 5, Disp-6 tablet, R-0Normal  2. Cough in adult patient  -     benzonatate (TESSALON PERLES) 100 MG capsule; Take 1 capsule by mouth 3 times daily as needed for Cough, Disp-21 capsule, R-0Normal   Continue over-the-counter medications as needed for symptoms. Use honey to the back of throat, salt water gargle as needed for sore throat, cough. Go to the ER for shortness of breath, chest pain. Patient verbalized understanding. Follow Up Instructions:      Return if symptoms worsen or fail to improve, for SOB, chest pain go to ER. Orders Placed This Encounter   Medications    predniSONE (DELTASONE) 20 MG tablet     Sig: Take 2 tablets by mouth daily for 5 days     Dispense:  10 tablet     Refill:  0    benzonatate (TESSALON PERLES) 100 MG capsule     Sig: Take 1 capsule by mouth 3 times daily as needed for Cough     Dispense:  21 capsule     Refill:  0    azithromycin (ZITHROMAX) 250 MG tablet     Sig: Take 1 tablet by mouth See Admin Instructions for 5 days 500mg on day 1 followed by 250mg on days 2 - 5     Dispense:  6 tablet     Refill:  0             Based on the duration and severity of the symptoms-- I will treat this as bacterial at this time. Patient instructed to complete antibiotic prescription fully. May use Motrin/Tylenol for fever/pain. Patient agreeable to treatment plan.   Educational materials provided on AVS.  Follow up if symptoms do not improve/worsen. Patient and/or parent given educational materials - see patient instructions. Discussed use, benefit, and side effects of prescribed medications. All patient questions answered. Patient and/or parent voiced understanding.       Electronically signed by TAIWO Mcclure 11/22/2022 at 1:06 PM

## 2022-12-05 ENCOUNTER — HOSPITAL ENCOUNTER (OUTPATIENT)
Age: 67
Discharge: HOME | End: 2022-12-05
Payer: MEDICARE

## 2022-12-05 ENCOUNTER — OFFICE VISIT (OUTPATIENT)
Dept: PULMONOLOGY | Age: 67
End: 2022-12-05
Payer: MEDICARE

## 2022-12-05 VITALS
RESPIRATION RATE: 20 BRPM | BODY MASS INDEX: 26 KG/M2 | OXYGEN SATURATION: 98 % | WEIGHT: 129 LBS | SYSTOLIC BLOOD PRESSURE: 139 MMHG | HEIGHT: 59 IN | HEART RATE: 83 BPM | DIASTOLIC BLOOD PRESSURE: 80 MMHG

## 2022-12-05 DIAGNOSIS — Z23 NEED FOR VACCINATION: ICD-10-CM

## 2022-12-05 DIAGNOSIS — J82.83 EOSINOPHILIC ASTHMA: ICD-10-CM

## 2022-12-05 DIAGNOSIS — J45.51 SEVERE PERSISTENT ASTHMA, POORLY-CONTROLLED, WITH ACUTE EXACERBATION: ICD-10-CM

## 2022-12-05 DIAGNOSIS — Z91.09 MULTIPLE ENVIRONMENTAL ALLERGIES: ICD-10-CM

## 2022-12-05 DIAGNOSIS — J45.51 SEVERE PERSISTENT ASTHMA, POORLY-CONTROLLED, WITH ACUTE EXACERBATION: Primary | ICD-10-CM

## 2022-12-05 LAB
EOSINOPHILS ABSOLUTE: 563 /UL (ref 200–400)
IGE: 121 IU/ML

## 2022-12-05 PROCEDURE — 90694 VACC AIIV4 NO PRSRV 0.5ML IM: CPT | Performed by: INTERNAL MEDICINE

## 2022-12-05 PROCEDURE — 3078F DIAST BP <80 MM HG: CPT | Performed by: INTERNAL MEDICINE

## 2022-12-05 PROCEDURE — 85048 AUTOMATED LEUKOCYTE COUNT: CPT

## 2022-12-05 PROCEDURE — 82785 ASSAY OF IGE: CPT

## 2022-12-05 PROCEDURE — 3017F COLORECTAL CA SCREEN DOC REV: CPT | Performed by: INTERNAL MEDICINE

## 2022-12-05 PROCEDURE — G8417 CALC BMI ABV UP PARAM F/U: HCPCS | Performed by: INTERNAL MEDICINE

## 2022-12-05 PROCEDURE — G8484 FLU IMMUNIZE NO ADMIN: HCPCS | Performed by: INTERNAL MEDICINE

## 2022-12-05 PROCEDURE — 99214 OFFICE O/P EST MOD 30 MIN: CPT | Performed by: INTERNAL MEDICINE

## 2022-12-05 PROCEDURE — 1036F TOBACCO NON-USER: CPT | Performed by: INTERNAL MEDICINE

## 2022-12-05 PROCEDURE — 1123F ACP DISCUSS/DSCN MKR DOCD: CPT | Performed by: INTERNAL MEDICINE

## 2022-12-05 PROCEDURE — 1090F PRES/ABSN URINE INCON ASSESS: CPT | Performed by: INTERNAL MEDICINE

## 2022-12-05 PROCEDURE — G8427 DOCREV CUR MEDS BY ELIG CLIN: HCPCS | Performed by: INTERNAL MEDICINE

## 2022-12-05 PROCEDURE — G0008 ADMIN INFLUENZA VIRUS VAC: HCPCS | Performed by: INTERNAL MEDICINE

## 2022-12-05 PROCEDURE — 3074F SYST BP LT 130 MM HG: CPT | Performed by: INTERNAL MEDICINE

## 2022-12-05 PROCEDURE — G8399 PT W/DXA RESULTS DOCUMENT: HCPCS | Performed by: INTERNAL MEDICINE

## 2022-12-05 RX ORDER — MONTELUKAST SODIUM 10 MG/1
10 TABLET ORAL NIGHTLY
Qty: 90 TABLET | Refills: 3 | Status: SHIPPED | OUTPATIENT
Start: 2022-12-05 | End: 2023-12-05

## 2022-12-05 RX ORDER — TIOTROPIUM BROMIDE INHALATION SPRAY 1.56 UG/1
2 SPRAY, METERED RESPIRATORY (INHALATION) DAILY
Qty: 3 EACH | Refills: 3 | Status: SHIPPED | OUTPATIENT
Start: 2022-12-05

## 2022-12-05 RX ORDER — BUDESONIDE AND FORMOTEROL FUMARATE DIHYDRATE 160; 4.5 UG/1; UG/1
2 AEROSOL RESPIRATORY (INHALATION) 2 TIMES DAILY
Qty: 3 EACH | Refills: 6 | Status: SHIPPED | OUTPATIENT
Start: 2022-12-05

## 2022-12-05 RX ORDER — ALBUTEROL SULFATE 90 UG/1
2 AEROSOL, METERED RESPIRATORY (INHALATION) EVERY 6 HOURS PRN
Qty: 3 EACH | Refills: 3 | Status: SHIPPED | OUTPATIENT
Start: 2022-12-05

## 2022-12-05 RX ORDER — TIOTROPIUM BROMIDE INHALATION SPRAY 1.56 UG/1
2 SPRAY, METERED RESPIRATORY (INHALATION) DAILY
Qty: 3 EACH | Refills: 0 | COMMUNITY
Start: 2022-12-05

## 2022-12-05 ASSESSMENT — ENCOUNTER SYMPTOMS
EYES NEGATIVE: 1
GASTROINTESTINAL NEGATIVE: 1
COUGH: 1
CHEST TIGHTNESS: 1
SHORTNESS OF BREATH: 1
WHEEZING: 1

## 2022-12-05 NOTE — RESULT ENCOUNTER NOTE
Abnormal result. Allergic protein(IgE) and eosinophils(white blood cell which causes inflamation in airways of asthmatics) both are elevated. Many options to treat with biologic agents (shots). I ordered Nucala autoinject. First shot in office. Keep taking all inhalers as ordered. Schedule F/U appt. Inform patient.

## 2022-12-06 NOTE — PROGRESS NOTES
Subjective:      Patient ID: Jeanette Rajan is a 75 y.o. female being seen in my clinic for   Chief Complaint   Patient presents with    Asthma     1 year follow up       HPI  Follow-up visit for asthma. Since her last visit a year ago her disease has not been under good control. Symptomatic on a near daily basis. Has been on prednisone at least twice this year. Most recently in November. Cough, chest tightness, wheezing, and shortness of breath. Denies specific environmental trigger. States that many years ago she had allergy testing and was told \"I am allergic to everything. \"  Did not receive immunotherapy. Does not have pets in the home. Uses Symbicort, Singulair, and albuterol. States that when she is on prednisone her symptoms are much improved. Previous IgE was elevated at 364. Eosinophils were low. This was done over 2 years ago. Patient received her initial COVID series but not by valent booster. Needs a flu shot. Review of Systems   Constitutional: Negative. HENT: Negative. Eyes: Negative. Respiratory:  Positive for cough, chest tightness, shortness of breath and wheezing. Cardiovascular: Negative. Gastrointestinal: Negative. All other systems reviewed and are negative.     Objective:     Vitals:    12/05/22 1338   BP: 139/80   Site: Left Upper Arm   Position: Sitting   Cuff Size: Medium Adult   Pulse: 83   Resp: 20   SpO2: 98%   Weight: 129 lb (58.5 kg)   Height: 4' 11\" (1.499 m)     Current Outpatient Medications   Medication Sig Dispense Refill    budesonide-formoterol (SYMBICORT) 160-4.5 MCG/ACT AERO Inhale 2 puffs into the lungs 2 times daily 3 each 6    montelukast (SINGULAIR) 10 MG tablet Take 1 tablet by mouth nightly 90 tablet 3    albuterol sulfate HFA (PROVENTIL HFA) 108 (90 Base) MCG/ACT inhaler Inhale 2 puffs into the lungs every 6 hours as needed for Wheezing or Shortness of Breath 3 each 3    tiotropium (SPIRIVA RESPIMAT) 1.25 MCG/ACT AERS inhaler Inhale 2 puffs into the lungs daily 3 each 3    tiotropium (SPIRIVA RESPIMAT) 1.25 MCG/ACT AERS inhaler Inhale 2 puffs into the lungs daily 3 each 0    mepolizumab (NUCALA) 100 MG SOLR injection Inject 1 mL into the skin every 30 days 1 each 11    alendronate (FOSAMAX) 70 MG tablet TAKE 1 TABLET BY MOUTH  EVERY 7 DAYS 12 tablet 2    omeprazole (PRILOSEC) 20 MG delayed release capsule TAKE ONE CAPSULE BY MOUTH DAILY 90 capsule 3    atorvastatin (LIPITOR) 20 MG tablet Take 1 tablet by mouth daily 90 tablet 3    metoprolol succinate (TOPROL XL) 50 MG extended release tablet Take 1 tablet by mouth daily 90 tablet 3    Cyanocobalamin (VITAMIN B-12 PO) Take by mouth      turmeric 500 MG CAPS Take by mouth daily      Multiple Vitamin (MULTI-VITAMIN DAILY PO) Take by mouth      clotrimazole-betamethasone (LOTRISONE) 1-0.05 % cream APPLY TO AFFECTED AREA(S) TWO TIMES A DAY 45 g 5     No current facility-administered medications for this visit. Physical Exam  Vitals and nursing note reviewed. Constitutional:       Appearance: She is well-developed. HENT:      Head: Normocephalic. Right Ear: Tympanic membrane, ear canal and external ear normal.      Left Ear: Tympanic membrane, ear canal and external ear normal.      Nose: Nose normal. No congestion. Mouth/Throat:      Mouth: Mucous membranes are moist.      Pharynx: Oropharynx is clear. No oropharyngeal exudate. Eyes:      General: No scleral icterus. Conjunctiva/sclera: Conjunctivae normal.   Neck:      Thyroid: No thyromegaly. Vascular: No JVD. Trachea: No tracheal deviation. Cardiovascular:      Rate and Rhythm: Normal rate and regular rhythm. Heart sounds: Normal heart sounds. No murmur heard. No gallop. Pulmonary:      Effort: Pulmonary effort is normal. No respiratory distress. Breath sounds: No wheezing, rhonchi or rales. Chest:      Chest wall: No tenderness. Abdominal:      Palpations: Abdomen is soft. Tenderness: There is no abdominal tenderness. Musculoskeletal:      Cervical back: Neck supple. Right lower leg: No edema. Left lower leg: No edema. Lymphadenopathy:      Cervical: No cervical adenopathy. Skin:     General: Skin is warm and dry. Neurological:      Mental Status: She is alert and oriented to person, place, and time. Wt Readings from Last 3 Encounters:   12/05/22 129 lb (58.5 kg)   11/22/22 132 lb (59.9 kg)   02/11/22 132 lb (59.9 kg)     Results for orders placed or performed during the hospital encounter of 02/09/21   T4, Free   Result Value Ref Range    Thyroxine, Free 1.00 0.93 - 1.70 ng/dL   TSH   Result Value Ref Range    TSH 1.77 0.30 - 5.00 mIU/L   Hepatitis C Antibody   Result Value Ref Range    Hepatitis C Ab NONREACTIVE NONREACTIVE   Lipid, Fasting   Result Value Ref Range    Cholesterol, Fasting 297 (H) <200 mg/dL    HDL 59 >40 mg/dL    LDL Cholesterol 217 (H) 0 - 130 mg/dL    Chol/HDL Ratio 5.0 (H) <5    Triglyceride, Fasting 107 <150 mg/dL    VLDL NOT REPORTED 1 - 30 mg/dL       :      1. Severe persistent asthma, poorly-controlled, with acute exacerbation    2. Multiple environmental allergies    3. Eosinophilic asthma    4. Need for vaccination      Patient Active Problem List   Diagnosis    Gastroesophageal reflux disease without esophagitis    Elevated lipids    Essential hypertension    Vulvar dystrophy    Skin sensitivity    Hypercholesterolemia         Plan:      Recheck IgE and eosinophils. Discussed biologic therapy in general terms. Add Spiriva 1.25 mcg 2 puffs daily. Refilled asthma medication. Quadrivalent flu shot. Avoid environmental triggers. Encouraged by valent COVID booster. After patient completed visit, I received back the results of her testing. IgE was elevated at 140. Eosinophils were elevated at 364. I recommended Nucala 100 mg subcu every 30 days auto inject. Follow-up in 2 months.   Orders Placed This Encounter   Medications budesonide-formoterol (SYMBICORT) 160-4.5 MCG/ACT AERO     Sig: Inhale 2 puffs into the lungs 2 times daily     Dispense:  3 each     Refill:  6    montelukast (SINGULAIR) 10 MG tablet     Sig: Take 1 tablet by mouth nightly     Dispense:  90 tablet     Refill:  3    albuterol sulfate HFA (PROVENTIL HFA) 108 (90 Base) MCG/ACT inhaler     Sig: Inhale 2 puffs into the lungs every 6 hours as needed for Wheezing or Shortness of Breath     Dispense:  3 each     Refill:  3    tiotropium (SPIRIVA RESPIMAT) 1.25 MCG/ACT AERS inhaler     Sig: Inhale 2 puffs into the lungs daily     Dispense:  3 each     Refill:  3    tiotropium (SPIRIVA RESPIMAT) 1.25 MCG/ACT AERS inhaler     Sig: Inhale 2 puffs into the lungs daily     Dispense:  3 each     Refill:  0    mepolizumab (NUCALA) 100 MG SOLR injection     Sig: Inject 1 mL into the skin every 30 days     Dispense:  1 each     Refill:  11     Auto-inject     Orders Placed This Encounter   Procedures    Influenza, FLUAD, (age 72 y+), IM, Preservative Free, 0.5 mL    Eosinophil Count     Standing Status:   Future     Number of Occurrences:   1     Standing Expiration Date:   12/5/2023    IgE     Standing Status:   Future     Number of Occurrences:   1     Standing Expiration Date:   12/5/2023     Return for After testing complete.        Electronically signed by [place] Murrell DO on 12/5/2022at 9:33 PM

## 2022-12-07 ENCOUNTER — TELEPHONE (OUTPATIENT)
Dept: PULMONOLOGY | Age: 67
End: 2022-12-07

## 2022-12-09 NOTE — TELEPHONE ENCOUNTER
Patient came in today and filled out her portion of the paperwork.   It is now on Dr. Pedrito Hogan desk to be signed

## 2022-12-21 NOTE — TELEPHONE ENCOUNTER
Got a denial letter for [employer] for the Nucala.  I started the appeal request and got everything faxed back over to [employer].  Waiting to here back with there response

## 2022-12-22 ENCOUNTER — HOSPITAL ENCOUNTER (OUTPATIENT)
Age: 67
Discharge: HOME | End: 2022-12-22
Payer: MEDICARE

## 2022-12-22 ENCOUNTER — OFFICE VISIT (OUTPATIENT)
Dept: FAMILY MEDICINE CLINIC | Age: 67
End: 2022-12-22

## 2022-12-22 VITALS
BODY MASS INDEX: 25.72 KG/M2 | TEMPERATURE: 98.1 F | HEIGHT: 59 IN | SYSTOLIC BLOOD PRESSURE: 140 MMHG | WEIGHT: 127.6 LBS | OXYGEN SATURATION: 98 % | DIASTOLIC BLOOD PRESSURE: 80 MMHG | HEART RATE: 93 BPM

## 2022-12-22 DIAGNOSIS — R23.8 SKIN SENSITIVITY: ICD-10-CM

## 2022-12-22 DIAGNOSIS — M81.0 AGE-RELATED OSTEOPOROSIS WITHOUT CURRENT PATHOLOGICAL FRACTURE: ICD-10-CM

## 2022-12-22 DIAGNOSIS — I10 ESSENTIAL HYPERTENSION: ICD-10-CM

## 2022-12-22 DIAGNOSIS — E78.2 MIXED HYPERLIPIDEMIA: ICD-10-CM

## 2022-12-22 DIAGNOSIS — J45.50 SEVERE PERSISTENT ASTHMA WITHOUT COMPLICATION: ICD-10-CM

## 2022-12-22 DIAGNOSIS — R73.09 ELEVATED GLUCOSE: ICD-10-CM

## 2022-12-22 DIAGNOSIS — Z76.89 ENCOUNTER TO ESTABLISH CARE: Primary | ICD-10-CM

## 2022-12-22 PROBLEM — E11.9 DIET-CONTROLLED DIABETES MELLITUS (HCC): Status: ACTIVE | Noted: 2022-12-22

## 2022-12-22 PROBLEM — E78.00 HYPERCHOLESTEROLEMIA: Status: RESOLVED | Noted: 2021-08-10 | Resolved: 2022-12-22

## 2022-12-22 LAB
ABSOLUTE EOS #: 0.43 K/UL (ref 0–0.44)
ABSOLUTE IMMATURE GRANULOCYTE: <0.03 K/UL (ref 0–0.3)
ABSOLUTE LYMPH #: 1.94 K/UL (ref 1.1–3.7)
ABSOLUTE MONO #: 0.4 K/UL (ref 0.1–1.2)
ALBUMIN SERPL-MCNC: 3.8 G/DL (ref 3.5–5.2)
ALBUMIN/GLOBULIN RATIO: 1.1 (ref 1–2.5)
ALP BLD-CCNC: 99 U/L (ref 35–104)
ALT SERPL-CCNC: 21 U/L (ref 5–33)
ANION GAP SERPL CALCULATED.3IONS-SCNC: 12 MMOL/L (ref 9–17)
AST SERPL-CCNC: 25 U/L
BASOPHILS # BLD: 0 % (ref 0–2)
BASOPHILS ABSOLUTE: <0.03 K/UL (ref 0–0.2)
BILIRUB SERPL-MCNC: 0.9 MG/DL (ref 0.3–1.2)
BUN BLDV-MCNC: 17 MG/DL (ref 8–23)
CALCIUM SERPL-MCNC: 8.7 MG/DL (ref 8.6–10.4)
CHLORIDE BLD-SCNC: 106 MMOL/L (ref 98–107)
CHOLESTEROL, FASTING: 161 MG/DL
CHOLESTEROL/HDL RATIO: 3.5
CO2: 26 MMOL/L (ref 20–31)
CREAT SERPL-MCNC: 0.91 MG/DL (ref 0.5–0.9)
EOSINOPHILS RELATIVE PERCENT: 6 % (ref 1–4)
ESTIMATED AVERAGE GLUCOSE: 143 MG/DL
GFR SERPL CREATININE-BSD FRML MDRD: >60 ML/MIN/1.73M2
GLUCOSE BLD-MCNC: 132 MG/DL (ref 70–99)
HBA1C MFR BLD: 6.6 % (ref 4–6)
HCT VFR BLD CALC: 40.6 % (ref 36.3–47.1)
HDLC SERPL-MCNC: 46 MG/DL
HEMOGLOBIN: 12.2 G/DL (ref 11.9–15.1)
IMMATURE GRANULOCYTES: 0 %
LDL CHOLESTEROL: 98 MG/DL (ref 0–130)
LYMPHOCYTES # BLD: 25 % (ref 24–43)
MCH RBC QN AUTO: 25.4 PG (ref 25.2–33.5)
MCHC RBC AUTO-ENTMCNC: 30 G/DL (ref 28.4–34.8)
MCV RBC AUTO: 84.4 FL (ref 82.6–102.9)
MONOCYTES # BLD: 5 % (ref 3–12)
NRBC AUTOMATED: 0 PER 100 WBC
PDW BLD-RTO: 14.4 % (ref 11.8–14.4)
PLATELET # BLD: 339 K/UL (ref 138–453)
PMV BLD AUTO: 10.3 FL (ref 8.1–13.5)
POTASSIUM SERPL-SCNC: 3.7 MMOL/L (ref 3.7–5.3)
RBC # BLD: 4.81 M/UL (ref 3.95–5.11)
SEG NEUTROPHILS: 64 % (ref 36–65)
SEGMENTED NEUTROPHILS ABSOLUTE COUNT: 5.06 K/UL (ref 1.5–8.1)
SODIUM BLD-SCNC: 144 MMOL/L (ref 135–144)
TOTAL PROTEIN: 7.3 G/DL (ref 6.4–8.3)
TRIGLYCERIDE, FASTING: 85 MG/DL
TSH SERPL DL<=0.05 MIU/L-ACNC: 1.98 UIU/ML (ref 0.3–5)
WBC # BLD: 7.9 K/UL (ref 3.5–11.3)

## 2022-12-22 PROCEDURE — 80061 LIPID PANEL: CPT

## 2022-12-22 PROCEDURE — 85025 COMPLETE CBC W/AUTO DIFF WBC: CPT

## 2022-12-22 PROCEDURE — 80053 COMPREHEN METABOLIC PANEL: CPT

## 2022-12-22 PROCEDURE — 36415 COLL VENOUS BLD VENIPUNCTURE: CPT

## 2022-12-22 PROCEDURE — 83036 HEMOGLOBIN GLYCOSYLATED A1C: CPT

## 2022-12-22 PROCEDURE — 84443 ASSAY THYROID STIM HORMONE: CPT

## 2022-12-22 RX ORDER — CLOTRIMAZOLE AND BETAMETHASONE DIPROPIONATE 10; .64 MG/G; MG/G
CREAM TOPICAL
Qty: 45 G | Refills: 3 | Status: SHIPPED | OUTPATIENT
Start: 2022-12-22

## 2022-12-22 RX ORDER — AMLODIPINE BESYLATE 5 MG/1
5 TABLET ORAL DAILY
Qty: 90 TABLET | Refills: 1 | Status: SHIPPED | OUTPATIENT
Start: 2022-12-22

## 2022-12-22 RX ORDER — CLOTRIMAZOLE AND BETAMETHASONE DIPROPIONATE 10; .64 MG/G; MG/G
CREAM TOPICAL
Qty: 45 G | Refills: 5 | Status: CANCELLED | OUTPATIENT
Start: 2022-12-22

## 2022-12-22 ASSESSMENT — ENCOUNTER SYMPTOMS
ALLERGIC/IMMUNOLOGIC NEGATIVE: 1
CHEST TIGHTNESS: 0
COUGH: 0
COLOR CHANGE: 0
CHOKING: 0
STRIDOR: 0
EYES NEGATIVE: 1
NAUSEA: 0
WHEEZING: 0
GASTROINTESTINAL NEGATIVE: 1
VOMITING: 0
DIARRHEA: 0
SHORTNESS OF BREATH: 1

## 2022-12-22 ASSESSMENT — PATIENT HEALTH QUESTIONNAIRE - PHQ9
SUM OF ALL RESPONSES TO PHQ QUESTIONS 1-9: 0
SUM OF ALL RESPONSES TO PHQ QUESTIONS 1-9: 0
2. FEELING DOWN, DEPRESSED OR HOPELESS: 0
SUM OF ALL RESPONSES TO PHQ QUESTIONS 1-9: 0
1. LITTLE INTEREST OR PLEASURE IN DOING THINGS: 0
SUM OF ALL RESPONSES TO PHQ9 QUESTIONS 1 & 2: 0
SUM OF ALL RESPONSES TO PHQ QUESTIONS 1-9: 0

## 2022-12-22 NOTE — PROGRESS NOTES
[address]  Dept: [phone]    Vazquez Deluna is a 75 y.o. female who presents today for her medical conditions/complaints as noted below.       Vazquez Deluna is here today c/o Establish Care (Previous [facility])    Past Medical History:   Diagnosis Date    Asthma 10/03/2013    Essential hypertension 01/17/2021    GERD (gastroesophageal reflux disease)     Hyperlipidemia       Past Surgical History:   Procedure Laterality Date    COLONOSCOPY  2007; due 2017       Family History   Problem Relation Age of Onset    Prostate Cancer Father        Social History     Tobacco Use    Smoking status: Never    Smokeless tobacco: Never   Substance Use Topics    Alcohol use: No      Current Outpatient Medications   Medication Sig Dispense Refill    budesonide-formoterol (SYMBICORT) 160-4.5 MCG/ACT AERO Inhale 2 puffs into the lungs 2 times daily 3 each 6    montelukast (SINGULAIR) 10 MG tablet Take 1 tablet by mouth nightly 90 tablet 3    albuterol sulfate HFA (PROVENTIL HFA) 108 (90 Base) MCG/ACT inhaler Inhale 2 puffs into the lungs every 6 hours as needed for Wheezing or Shortness of Breath 3 each 3    tiotropium (SPIRIVA RESPIMAT) 1.25 MCG/ACT AERS inhaler Inhale 2 puffs into the lungs daily 3 each 3    alendronate (FOSAMAX) 70 MG tablet TAKE 1 TABLET BY MOUTH  EVERY 7 DAYS 12 tablet 2    omeprazole (PRILOSEC) 20 MG delayed release capsule TAKE ONE CAPSULE BY MOUTH DAILY 90 capsule 3    atorvastatin (LIPITOR) 20 MG tablet Take 1 tablet by mouth daily 90 tablet 3    metoprolol succinate (TOPROL XL) 50 MG extended release tablet Take 1 tablet by mouth daily 90 tablet 3    Cyanocobalamin (VITAMIN B-12 PO) Take by mouth      Multiple Vitamin (MULTI-VITAMIN DAILY PO) Take by mouth      clotrimazole-betamethasone (LOTRISONE) 1-0.05 % cream APPLY TO AFFECTED AREA(S) TWO TIMES A DAY 45 g 5    mepolizumab (NUCALA) 100 MG SOLR injection Inject 1 mL into the skin every 30 days (Patient not taking: Reported on 12/22/2022) 1 each 11    turmeric 500 MG CAPS Take by mouth daily       No current facility-administered medications for this visit. Allergies   Allergen Reactions    Sulfamethoxazole-Trimethoprim          HPI:     HPI    Presents today c/o NTP, last PCP BS     Specialists -    Pulmonary Dr. Stone Ortiz h/o severe asthma [Spiriva , Symbicort, Singlair, Nucala]   Associated cough, chest tightness, wheezing & dyspnea with activities     H/o osteoporosis   Taking Fosamax     H/o HLD  Taking atorvastatin    H/o HTN  Taking metoprolol  BP running 130 / 70's on average      H/o GERD  Taking Prilosec with relief     Reports severe underarm itching that she uses Lotrisone cream PRN for with relief   No rash     Health Maintenance:      Subjective:     Review of Systems   Constitutional: Negative. Negative for appetite change, chills, diaphoresis, fatigue, fever and unexpected weight change. HENT: Negative. Eyes: Negative. Respiratory:  Positive for shortness of breath. Negative for cough, choking, chest tightness, wheezing and stridor. Cardiovascular: Negative. Negative for chest pain and leg swelling. Gastrointestinal: Negative. Negative for diarrhea, nausea and vomiting. Endocrine: Negative. Genitourinary: Negative. Negative for difficulty urinating. Musculoskeletal: Negative. Skin: Negative. Negative for color change, pallor, rash and wound. Allergic/Immunologic: Negative. Neurological: Negative. Negative for dizziness, seizures, syncope, light-headedness and headaches. Hematological: Negative. Psychiatric/Behavioral: Negative. Negative for dysphoric mood. The patient is not nervous/anxious. Objective:     Vitals:    12/22/22 0809   BP: (!) 150/80   Pulse: 93   Temp: 98.1 °F (36.7 °C)   SpO2: 98%     Vitals:    12/22/22 0822   BP: (!) 140/80   Pulse:    Temp:    SpO2:         Body mass index is 25.77 kg/m².       Physical Exam  Constitutional:       General: She is not in acute distress. Appearance: Normal appearance. She is well-developed and normal weight. She is not ill-appearing, toxic-appearing or diaphoretic. HENT:      Head: Normocephalic and atraumatic. Right Ear: External ear normal.      Left Ear: External ear normal.      Nose: Nose normal.   Eyes:      General: No scleral icterus. Right eye: No discharge. Left eye: No discharge. Conjunctiva/sclera: Conjunctivae normal.      Pupils: Pupils are equal, round, and reactive to light. Neck:      Trachea: No tracheal deviation. Cardiovascular:      Rate and Rhythm: Normal rate and regular rhythm. Heart sounds: Normal heart sounds. No murmur heard. No friction rub. No gallop. Pulmonary:      Effort: Pulmonary effort is normal. No tachypnea, accessory muscle usage or respiratory distress. Breath sounds: Normal breath sounds. Decreased air movement present. No stridor. No decreased breath sounds, wheezing, rhonchi or rales. Abdominal:      Palpations: Abdomen is soft. Musculoskeletal:         General: No tenderness or deformity. Normal range of motion. Cervical back: Normal range of motion and neck supple. Skin:     General: Skin is warm and dry. Coloration: Skin is not pale. Findings: No erythema or rash. Neurological:      Mental Status: She is alert and oriented to person, place, and time. GCS: GCS eye subscore is 4. GCS verbal subscore is 5. GCS motor subscore is 6. Gait: Gait is intact. Gait normal.   Psychiatric:         Speech: Speech normal.         Behavior: Behavior normal.         Thought Content: Thought content normal.         Judgment: Judgment normal.         Assessment:         1. Encounter to establish care    2. Essential hypertension    3. Mixed hyperlipidemia    4. Severe persistent asthma without complication    5. Elevated glucose    6. Skin sensitivity    7.  Age-related osteoporosis without current pathological fracture        Plan:     1. Encounter to establish care      2. Essential hypertension    - CBC with Auto Differential; Future  - Comprehensive Metabolic Panel; Future  - TSH with Reflex; Future  - amLODIPine (NORVASC) 5 MG tablet; Take 1 tablet by mouth daily  Dispense: 90 tablet; Refill: 1    BP above goal  D/c metoprolol secondary to asthma & neg h/o CVD  Start amlodipine, side effects discussed  Update labs  F/u 1 month, bring BP log to appointment     3. Mixed hyperlipidemia    - Lipid, Fasting; Future    Continue statin, update labs    4. Severe persistent asthma without complication    Follows w/ Pulmonary, continue current medications   Encouraged COVID booster     5. Elevated glucose    - Hemoglobin A1C; Future    6. Skin sensitivity    - clotrimazole-betamethasone (LOTRISONE) 1-0.05 % cream; APPLY TO AFFECTED AREA(S) TWO TIMES A DAY for up to 2 weeks  Dispense: 45 g; Refill: 3    7. Age related osteoporosis     On Fosamax, continue     Discussed use, benefit, and side effects of prescribed medications. All patient questions answered. Pt voiced understanding. Reviewed health maintenance. Instructed to continue current medications, diet and exercise. Patient agreedwith treatment plan. Follow up as directed.      Electronically signed by TAIWO Maier CNP on 12/22/2022

## 2022-12-28 NOTE — TELEPHONE ENCOUNTER
Received summary of benefits back from [facility] to [facility].   Still waiting on PA

## 2023-01-05 ENCOUNTER — TELEPHONE (OUTPATIENT)
Dept: PULMONOLOGY | Age: 68
End: 2023-01-05

## 2023-01-05 DIAGNOSIS — T78.2XXA ANAPHYLAXIS, INITIAL ENCOUNTER: Primary | ICD-10-CM

## 2023-01-05 RX ORDER — EPINEPHRINE 0.3 MG/.3ML
0.3 INJECTION SUBCUTANEOUS ONCE
Qty: 0.3 ML | Refills: 0 | Status: SHIPPED | OUTPATIENT
Start: 2023-01-05 | End: 2023-01-05

## 2023-01-05 NOTE — TELEPHONE ENCOUNTER
I got patient approved for Zahira Martinez. Patient will need an EpiPen. Not sure which one to choose. Can you please send over the appropriate one.   Thank you

## 2023-01-05 NOTE — TELEPHONE ENCOUNTER
Patient was approved for nucala and I called [facility] to [place] to find out out next steps.   They are supposed to be faxing over a form to be filled out and once it is faxed back they will ship us her 1st injection

## 2023-01-06 NOTE — TELEPHONE ENCOUNTER
Spoke with patient and explained to her what is going on with everything and that there is an EpiPen script for her to  and bring with her when she has her 1st injection.

## 2023-01-10 NOTE — TELEPHONE ENCOUNTER
Faxed over paper work to [facility] to [facility].   They said once they receive this paperwork they will ship out 1st injection

## 2023-01-13 NOTE — TELEPHONE ENCOUNTER
Called [place] [facility] to find out the status and they stated they did receive the rest of the paper work and they are going over everything and to call back tues 1/17/2023 to see if it is done to get a shipment date

## 2023-01-18 NOTE — TELEPHONE ENCOUNTER
Spoke with [facility] and they stated that the medication will be here in 24 to 48 hrs. Ill schedule the patients appt once it arrives.

## 2023-01-20 NOTE — TELEPHONE ENCOUNTER
Called [facility] again because we still have not received patients nucala. The lady I spoke with again said that it should be here tues for sure.   She had it verified before I got off the phone

## 2023-01-23 ENCOUNTER — OFFICE VISIT (OUTPATIENT)
Dept: FAMILY MEDICINE CLINIC | Age: 68
End: 2023-01-23
Payer: MEDICARE

## 2023-01-23 VITALS
BODY MASS INDEX: 25.69 KG/M2 | SYSTOLIC BLOOD PRESSURE: 128 MMHG | OXYGEN SATURATION: 97 % | WEIGHT: 127.2 LBS | HEART RATE: 88 BPM | TEMPERATURE: 97.2 F | DIASTOLIC BLOOD PRESSURE: 76 MMHG

## 2023-01-23 DIAGNOSIS — Z12.31 ENCOUNTER FOR SCREENING MAMMOGRAM FOR MALIGNANT NEOPLASM OF BREAST: ICD-10-CM

## 2023-01-23 DIAGNOSIS — M81.0 AGE-RELATED OSTEOPOROSIS WITHOUT CURRENT PATHOLOGICAL FRACTURE: ICD-10-CM

## 2023-01-23 DIAGNOSIS — E11.9 DIET-CONTROLLED DIABETES MELLITUS (HCC): Primary | ICD-10-CM

## 2023-01-23 DIAGNOSIS — I10 ESSENTIAL HYPERTENSION: ICD-10-CM

## 2023-01-23 DIAGNOSIS — E78.2 MIXED HYPERLIPIDEMIA: ICD-10-CM

## 2023-01-23 PROCEDURE — 3078F DIAST BP <80 MM HG: CPT | Performed by: NURSE PRACTITIONER

## 2023-01-23 PROCEDURE — 2022F DILAT RTA XM EVC RTNOPTHY: CPT | Performed by: NURSE PRACTITIONER

## 2023-01-23 PROCEDURE — G8399 PT W/DXA RESULTS DOCUMENT: HCPCS | Performed by: NURSE PRACTITIONER

## 2023-01-23 PROCEDURE — 3074F SYST BP LT 130 MM HG: CPT | Performed by: NURSE PRACTITIONER

## 2023-01-23 PROCEDURE — G8484 FLU IMMUNIZE NO ADMIN: HCPCS | Performed by: NURSE PRACTITIONER

## 2023-01-23 PROCEDURE — 1036F TOBACCO NON-USER: CPT | Performed by: NURSE PRACTITIONER

## 2023-01-23 PROCEDURE — 3017F COLORECTAL CA SCREEN DOC REV: CPT | Performed by: NURSE PRACTITIONER

## 2023-01-23 PROCEDURE — G8427 DOCREV CUR MEDS BY ELIG CLIN: HCPCS | Performed by: NURSE PRACTITIONER

## 2023-01-23 PROCEDURE — G8417 CALC BMI ABV UP PARAM F/U: HCPCS | Performed by: NURSE PRACTITIONER

## 2023-01-23 PROCEDURE — 1090F PRES/ABSN URINE INCON ASSESS: CPT | Performed by: NURSE PRACTITIONER

## 2023-01-23 PROCEDURE — 3046F HEMOGLOBIN A1C LEVEL >9.0%: CPT | Performed by: NURSE PRACTITIONER

## 2023-01-23 PROCEDURE — 1123F ACP DISCUSS/DSCN MKR DOCD: CPT | Performed by: NURSE PRACTITIONER

## 2023-01-23 PROCEDURE — 99214 OFFICE O/P EST MOD 30 MIN: CPT | Performed by: NURSE PRACTITIONER

## 2023-01-23 RX ORDER — LOSARTAN POTASSIUM 50 MG/1
50 TABLET ORAL DAILY
Qty: 90 TABLET | Refills: 0 | Status: SHIPPED | OUTPATIENT
Start: 2023-01-23

## 2023-01-23 RX ORDER — ALENDRONATE SODIUM 70 MG/1
70 TABLET ORAL
Qty: 12 TABLET | Refills: 1 | Status: SHIPPED | OUTPATIENT
Start: 2023-01-23

## 2023-01-23 ASSESSMENT — ENCOUNTER SYMPTOMS
VOMITING: 0
ALLERGIC/IMMUNOLOGIC NEGATIVE: 1
COUGH: 0
COLOR CHANGE: 0
WHEEZING: 0
NAUSEA: 0
DIARRHEA: 0
EYES NEGATIVE: 1
GASTROINTESTINAL NEGATIVE: 1
RESPIRATORY NEGATIVE: 1

## 2023-01-23 ASSESSMENT — PATIENT HEALTH QUESTIONNAIRE - PHQ9
2. FEELING DOWN, DEPRESSED OR HOPELESS: 0
SUM OF ALL RESPONSES TO PHQ QUESTIONS 1-9: 0
SUM OF ALL RESPONSES TO PHQ QUESTIONS 1-9: 0
1. LITTLE INTEREST OR PLEASURE IN DOING THINGS: 0
SUM OF ALL RESPONSES TO PHQ QUESTIONS 1-9: 0
SUM OF ALL RESPONSES TO PHQ9 QUESTIONS 1 & 2: 0
SUM OF ALL RESPONSES TO PHQ QUESTIONS 1-9: 0

## 2023-01-23 NOTE — PROGRESS NOTES
[address]  Dept: [phone]      Celina Ji is a 70 y.o. female who presents today for her medical conditions/complaintsas noted below.       Celina Ji is here today c/o Hypertension and Discuss Labs    Past Medical History:   Diagnosis Date    Asthma 10/03/2013    Essential hypertension 01/17/2021    GERD (gastroesophageal reflux disease)     Hyperlipidemia       Past Surgical History:   Procedure Laterality Date    COLONOSCOPY  2007; due 2017       Family History   Problem Relation Age of Onset    Prostate Cancer Father        Social History     Tobacco Use    Smoking status: Never    Smokeless tobacco: Never   Substance Use Topics    Alcohol use: No      Current Outpatient Medications   Medication Sig Dispense Refill    EPINEPHrine (EPIPEN 2-MEKHI) 0.3 MG/0.3ML SOAJ injection Inject 0.3 mLs into the muscle once for 1 dose Use as directed for allergic reaction 0.3 mL 0    amLODIPine (NORVASC) 5 MG tablet Take 1 tablet by mouth daily 90 tablet 1    clotrimazole-betamethasone (LOTRISONE) 1-0.05 % cream APPLY TO AFFECTED AREA(S) TWO TIMES A DAY for up to 2 weeks 45 g 3    budesonide-formoterol (SYMBICORT) 160-4.5 MCG/ACT AERO Inhale 2 puffs into the lungs 2 times daily 3 each 6    montelukast (SINGULAIR) 10 MG tablet Take 1 tablet by mouth nightly 90 tablet 3    albuterol sulfate HFA (PROVENTIL HFA) 108 (90 Base) MCG/ACT inhaler Inhale 2 puffs into the lungs every 6 hours as needed for Wheezing or Shortness of Breath 3 each 3    tiotropium (SPIRIVA RESPIMAT) 1.25 MCG/ACT AERS inhaler Inhale 2 puffs into the lungs daily 3 each 3    mepolizumab (NUCALA) 100 MG SOLR injection Inject 1 mL into the skin every 30 days (Patient not taking: Reported on 12/22/2022) 1 each 11    alendronate (FOSAMAX) 70 MG tablet TAKE 1 TABLET BY MOUTH  EVERY 7 DAYS 12 tablet 2    omeprazole (PRILOSEC) 20 MG delayed release capsule TAKE ONE CAPSULE BY MOUTH DAILY 90 capsule 3    atorvastatin (LIPITOR) 20 MG tablet Take 1 tablet by mouth daily 90 tablet 3    Cyanocobalamin (VITAMIN B-12 PO) Take by mouth      turmeric 500 MG CAPS Take by mouth daily      Multiple Vitamin (MULTI-VITAMIN DAILY PO) Take by mouth       No current facility-administered medications for this visit. Allergies   Allergen Reactions    Sulfamethoxazole-Trimethoprim          HPI:     HPI    Presents today c/o follow-up HTN & labs     H/o new onset DM2  A1C 6.6%   Reports family history of diabetes   Has lost 2 lbs since last visit  Is following a low carb low sugar diet     H/o osteoporosis   Taking Fosamax      H/o HLD  Taking atorvastatin     H/o HTN  Taking amlodipine   Tolerating medication without side effects   BP running 110-130 / 60-70's on average at home   Occasionally a little higher      H/o GERD  Taking Prilosec with relief     Specialists -     Pulmonary Dr. Dar Root h/o severe asthma [Spiriva , Symbicort, Singlair, Nucala]   Associated cough, chest tightness, wheezing & dyspnea with activities     Health Maintenance:      Subjective:     Review of Systems   Constitutional: Negative. Negative for appetite change, chills, diaphoresis, fatigue, fever and unexpected weight change. HENT: Negative. Eyes: Negative. Respiratory: Negative. Negative for cough and wheezing. Cardiovascular: Negative. Negative for chest pain and leg swelling. Gastrointestinal: Negative. Negative for diarrhea, nausea and vomiting. Endocrine: Negative. Genitourinary: Negative. Negative for difficulty urinating and dysuria. Musculoskeletal: Negative. Skin: Negative. Negative for color change, pallor, rash and wound. Allergic/Immunologic: Negative. Neurological: Negative. Negative for dizziness, seizures, syncope, light-headedness and headaches. Hematological: Negative. Psychiatric/Behavioral: Negative. Negative for dysphoric mood. The patient is not nervous/anxious. Objective:     Vitals:    01/23/23 1300   BP: 128/76   Pulse: 88   Temp: 97.2 °F (36.2 °C)   SpO2: 97%       Body mass index is 25.69 kg/m². Physical Exam  Constitutional:       General: She is not in acute distress. Appearance: Normal appearance. She is well-developed and normal weight. She is not ill-appearing, toxic-appearing or diaphoretic. HENT:      Head: Normocephalic and atraumatic. Right Ear: External ear normal.      Left Ear: External ear normal.      Nose: Nose normal.   Eyes:      General: No scleral icterus. Right eye: No discharge. Left eye: No discharge. Conjunctiva/sclera: Conjunctivae normal.      Pupils: Pupils are equal, round, and reactive to light. Neck:      Trachea: No tracheal deviation. Cardiovascular:      Rate and Rhythm: Normal rate and regular rhythm. Heart sounds: Normal heart sounds. No murmur heard. No friction rub. No gallop. Pulmonary:      Effort: Pulmonary effort is normal. No tachypnea, accessory muscle usage or respiratory distress. Breath sounds: Normal breath sounds. No stridor. No decreased breath sounds, wheezing, rhonchi or rales. Abdominal:      Palpations: Abdomen is soft. Musculoskeletal:         General: No tenderness or deformity. Normal range of motion. Cervical back: Normal range of motion and neck supple. Skin:     General: Skin is warm and dry. Coloration: Skin is not pale. Findings: No erythema or rash. Neurological:      Mental Status: She is alert and oriented to person, place, and time. GCS: GCS eye subscore is 4. GCS verbal subscore is 5. GCS motor subscore is 6. Gait: Gait is intact. Gait normal.   Psychiatric:         Speech: Speech normal.         Behavior: Behavior normal.         Thought Content: Thought content normal.         Judgment: Judgment normal.         Assessment:         1. Diet-controlled diabetes mellitus ([facility] [address]    2. Essential hypertension    3.  Mixed hyperlipidemia    4. Age-related osteoporosis without current pathological fracture    5. Encounter for screening mammogram for malignant neoplasm of breast        Plan:     1. Diet-controlled diabetes mellitus ([facility] [address]    - Ambulatory referral to Diabetic Education  - Hemoglobin A1C; Future  - Microalbumin, Ur; Future    New onset, A1C at goal  Diabetic education recommended  Dietary handouts given  F/u 4 months or sooner PRN  Declines medication at this time     Instructed goal A1C 6.5-7 %   Advised medication benefits and side effects   Advised dietary recommendations including avoidance of carbs and concentrated sweets  Importance of daily physical activity and weight loss in disease management  Discussed importance of statin and ASCVD benefits  Advised yearly diabetic eye examinations  Advised routine monitoring of feet  Importance to notify if hypoglycemia sx and directions  Importance of regular meals with insulin/medications   Adverse effects of uncontrolled diabetes including heart disease, CVA, kidney disease, neuropathy, retinopathy, PVD etc.  Importance of routine follow-up every 3 months is key     2. Essential hypertension    - losartan (COZAAR) 50 MG tablet; Take 1 tablet by mouth daily  Dispense: 90 tablet; Refill: 0    D/c amlodipine & start losartan due to new onset DM2 & renal protection  Encouraged home BP monitoring , monitor for fluctuation  F/u 4 months or sooner PRN     3. Mixed hyperlipidemia    Controlled, continue med     4. Age-related osteoporosis without current pathological fracture    - alendronate (FOSAMAX) 70 MG tablet; Take 1 tablet by mouth every 7 days  Dispense: 12 tablet; Refill: 1    5. Encounter for screening mammogram for malignant neoplasm of breast    - LINDA TREY DIGITAL SCREEN BILATERAL; Future        Discussed use, benefit, and side effects of prescribed medications. All patient questions answered. Pt voiced understanding. Reviewed health maintenance. Instructed to continue current medications, diet and exercise. Patient agreedwith treatment plan. Follow up as directed.      Electronically signed by TAIWO Ellison CNP on 1/23/2023

## 2023-01-25 NOTE — TELEPHONE ENCOUNTER
Jacquelin Chew in and informed patient that we will give her the 1st injection at her next appt.  Feb 6

## 2023-02-06 ENCOUNTER — OFFICE VISIT (OUTPATIENT)
Dept: PULMONOLOGY | Age: 68
End: 2023-02-06
Payer: MEDICARE

## 2023-02-06 VITALS
HEIGHT: 59 IN | RESPIRATION RATE: 20 BRPM | SYSTOLIC BLOOD PRESSURE: 138 MMHG | OXYGEN SATURATION: 98 % | HEART RATE: 92 BPM | DIASTOLIC BLOOD PRESSURE: 81 MMHG | WEIGHT: 125 LBS | BODY MASS INDEX: 25.2 KG/M2

## 2023-02-06 DIAGNOSIS — J45.51 SEVERE PERSISTENT ASTHMA, POORLY-CONTROLLED, WITH ACUTE EXACERBATION: ICD-10-CM

## 2023-02-06 DIAGNOSIS — J82.83 EOSINOPHILIC ASTHMA: Primary | ICD-10-CM

## 2023-02-06 DIAGNOSIS — Z91.09 MULTIPLE ENVIRONMENTAL ALLERGIES: ICD-10-CM

## 2023-02-06 DIAGNOSIS — J45.50 POORLY CONTROLLED SEVERE PERSISTENT ASTHMA WITHOUT COMPLICATION: ICD-10-CM

## 2023-02-06 PROCEDURE — G8399 PT W/DXA RESULTS DOCUMENT: HCPCS | Performed by: INTERNAL MEDICINE

## 2023-02-06 PROCEDURE — 1123F ACP DISCUSS/DSCN MKR DOCD: CPT | Performed by: INTERNAL MEDICINE

## 2023-02-06 PROCEDURE — 1036F TOBACCO NON-USER: CPT | Performed by: INTERNAL MEDICINE

## 2023-02-06 PROCEDURE — 3079F DIAST BP 80-89 MM HG: CPT | Performed by: INTERNAL MEDICINE

## 2023-02-06 PROCEDURE — G8427 DOCREV CUR MEDS BY ELIG CLIN: HCPCS | Performed by: INTERNAL MEDICINE

## 2023-02-06 PROCEDURE — 1090F PRES/ABSN URINE INCON ASSESS: CPT | Performed by: INTERNAL MEDICINE

## 2023-02-06 PROCEDURE — G8417 CALC BMI ABV UP PARAM F/U: HCPCS | Performed by: INTERNAL MEDICINE

## 2023-02-06 PROCEDURE — 96372 THER/PROPH/DIAG INJ SC/IM: CPT | Performed by: INTERNAL MEDICINE

## 2023-02-06 PROCEDURE — 3075F SYST BP GE 130 - 139MM HG: CPT | Performed by: INTERNAL MEDICINE

## 2023-02-06 PROCEDURE — 3017F COLORECTAL CA SCREEN DOC REV: CPT | Performed by: INTERNAL MEDICINE

## 2023-02-06 PROCEDURE — G8484 FLU IMMUNIZE NO ADMIN: HCPCS | Performed by: INTERNAL MEDICINE

## 2023-02-06 PROCEDURE — 99213 OFFICE O/P EST LOW 20 MIN: CPT | Performed by: INTERNAL MEDICINE

## 2023-02-06 RX ORDER — TIOTROPIUM BROMIDE INHALATION SPRAY 1.56 UG/1
2 SPRAY, METERED RESPIRATORY (INHALATION) DAILY
Qty: 3 EACH | Refills: 3 | Status: SHIPPED | OUTPATIENT
Start: 2023-02-06

## 2023-02-06 ASSESSMENT — ENCOUNTER SYMPTOMS
SHORTNESS OF BREATH: 1
WHEEZING: 1
GASTROINTESTINAL NEGATIVE: 1
EYES NEGATIVE: 1
CHEST TIGHTNESS: 1

## 2023-02-06 NOTE — PROGRESS NOTES
After obtaining consent, and per orders of Dr. Chapito Garduno, injection of Nucala given in Left arm by Guadalupe Jordan MA. Patient instructed to remain in clinic for 20 minutes afterwards, and to report any adverse reaction to me immediately.

## 2023-02-06 NOTE — PROGRESS NOTES
Subjective:      Patient ID: Ebonie Mcgee is a 70 y.o. female being seen in my clinic for   Chief Complaint   Patient presents with    Asthma     2 month follow up       HPI  Follow-up visit for eosinophilic asthma. Since her last visit 2 months ago she still has not received her first Nucala dose. Asthma has been under fair control. Believes that things are better since she started Spiriva 1.25 mcg. Unfortunately she is symptomatic on a daily basis. \"Getting in the shower really kills me. \"  Using albuterol inhaler frequently. No prednisone over the last couple of months. Recently diagnosed with borderline diabetes. States that she has lost about 7 pounds intentionally. Review of Systems   Constitutional: Negative. HENT: Negative. Eyes: Negative. Respiratory:  Positive for chest tightness, shortness of breath and wheezing. Cardiovascular: Negative. Gastrointestinal: Negative. All other systems reviewed and are negative.     Objective:     Vitals:    02/06/23 1344 02/06/23 1350   BP: (!) 143/84 138/81   Site: Left Upper Arm Left Upper Arm   Position: Sitting Sitting   Cuff Size: Medium Adult Medium Adult   Pulse: 92    Resp: 20    SpO2: 98%    Weight: 125 lb (56.7 kg)    Height: 4' 11\" (1.499 m)      Current Outpatient Medications   Medication Sig Dispense Refill    tiotropium (SPIRIVA RESPIMAT) 1.25 MCG/ACT AERS inhaler Inhale 2 puffs into the lungs daily 3 each 3    alendronate (FOSAMAX) 70 MG tablet Take 1 tablet by mouth every 7 days 12 tablet 1    losartan (COZAAR) 50 MG tablet Take 1 tablet by mouth daily 90 tablet 0    clotrimazole-betamethasone (LOTRISONE) 1-0.05 % cream APPLY TO AFFECTED AREA(S) TWO TIMES A DAY for up to 2 weeks 45 g 3    budesonide-formoterol (SYMBICORT) 160-4.5 MCG/ACT AERO Inhale 2 puffs into the lungs 2 times daily 3 each 6    montelukast (SINGULAIR) 10 MG tablet Take 1 tablet by mouth nightly 90 tablet 3    albuterol sulfate HFA (PROVENTIL HFA) 108 (90 Base) MCG/ACT inhaler Inhale 2 puffs into the lungs every 6 hours as needed for Wheezing or Shortness of Breath 3 each 3    mepolizumab (NUCALA) 100 MG SOLR injection Inject 1 mL into the skin every 30 days 1 each 11    omeprazole (PRILOSEC) 20 MG delayed release capsule TAKE ONE CAPSULE BY MOUTH DAILY 90 capsule 3    atorvastatin (LIPITOR) 20 MG tablet Take 1 tablet by mouth daily 90 tablet 3    Cyanocobalamin (VITAMIN B-12 PO) Take by mouth      turmeric 500 MG CAPS Take by mouth daily      Multiple Vitamin (MULTI-VITAMIN DAILY PO) Take by mouth      EPINEPHrine (EPIPEN 2-MEKHI) 0.3 MG/0.3ML SOAJ injection Inject 0.3 mLs into the muscle once for 1 dose Use as directed for allergic reaction 0.3 mL 0     No current facility-administered medications for this visit. Physical Exam  Vitals and nursing note reviewed. Constitutional:       Appearance: She is well-developed. HENT:      Head: Normocephalic. Nose: Nose normal. No congestion. Mouth/Throat:      Mouth: Mucous membranes are moist.      Pharynx: Oropharynx is clear. No oropharyngeal exudate. Comments: Edentulous with upper and lower plate  Eyes:      General: No scleral icterus. Conjunctiva/sclera: Conjunctivae normal.   Neck:      Thyroid: No thyromegaly. Vascular: No JVD. Trachea: No tracheal deviation. Cardiovascular:      Rate and Rhythm: Normal rate and regular rhythm. Heart sounds: Normal heart sounds. No murmur heard. No gallop. Pulmonary:      Effort: Pulmonary effort is normal. No respiratory distress. Breath sounds: Wheezing present. No rales. Chest:      Chest wall: No tenderness. Abdominal:      Palpations: Abdomen is soft. Tenderness: There is no abdominal tenderness. Musculoskeletal:      Cervical back: Neck supple. Right lower leg: No edema. Left lower leg: No edema. Lymphadenopathy:      Cervical: No cervical adenopathy. Skin:     General: Skin is warm and dry. Neurological:      Mental Status: She is alert and oriented to person, place, and time.      Wt Readings from Last 3 Encounters:   02/06/23 125 lb (56.7 kg)   01/23/23 127 lb 3.2 oz (57.7 kg)   12/22/22 127 lb 9.6 oz (57.9 kg)     Results for orders placed or performed during the hospital encounter of 12/22/22   TSH with Reflex   Result Value Ref Range    TSH 1.98 0.30 - 5.00 uIU/mL   Lipid, Fasting   Result Value Ref Range    Cholesterol, Fasting 161 <200 mg/dL    HDL 46 >40 mg/dL    LDL Cholesterol 98 0 - 130 mg/dL    Chol/HDL Ratio 3.5 <5    Triglyceride, Fasting 85 <150 mg/dL   Hemoglobin A1C   Result Value Ref Range    Hemoglobin A1C 6.6 (H) 4.0 - 6.0 %    Estimated Avg Glucose 143 mg/dL   Comprehensive Metabolic Panel   Result Value Ref Range    Glucose 132 (H) 70 - 99 mg/dL    BUN 17 8 - 23 mg/dL    Creatinine 0.91 (H) 0.50 - 0.90 mg/dL    Est, Glom Filt Rate >60 >60 mL/min/1.73m2    Calcium 8.7 8.6 - 10.4 mg/dL    Sodium 144 135 - 144 mmol/L    Potassium 3.7 3.7 - 5.3 mmol/L    Chloride 106 98 - 107 mmol/L    CO2 26 20 - 31 mmol/L    Anion Gap 12 9 - 17 mmol/L    Alkaline Phosphatase 99 35 - 104 U/L    ALT 21 5 - 33 U/L    AST 25 <32 U/L    Total Bilirubin 0.9 0.3 - 1.2 mg/dL    Total Protein 7.3 6.4 - 8.3 g/dL    Albumin 3.8 3.5 - 5.2 g/dL    Albumin/Globulin Ratio 1.1 1.0 - 2.5   CBC with Auto Differential   Result Value Ref Range    WBC 7.9 3.5 - 11.3 k/uL    RBC 4.81 3.95 - 5.11 m/uL    Hemoglobin 12.2 11.9 - 15.1 g/dL    Hematocrit 40.6 36.3 - 47.1 %    MCV 84.4 82.6 - 102.9 fL    MCH 25.4 25.2 - 33.5 pg    MCHC 30.0 28.4 - 34.8 g/dL    RDW 14.4 11.8 - 14.4 %    Platelets 196 400 - 051 k/uL    MPV 10.3 8.1 - 13.5 fL    NRBC Automated 0.0 0.0 per 100 WBC    Seg Neutrophils 64 36 - 65 %    Lymphocytes 25 24 - 43 %    Monocytes 5 3 - 12 %    Eosinophils % 6 (H) 1 - 4 %    Basophils 0 0 - 2 %    Immature Granulocytes 0 0 %    Segs Absolute 5.06 1.50 - 8.10 k/uL    Absolute Lymph # 1.94 1.10 - 3.70 k/uL Absolute Mono # 0.40 0.10 - 1.20 k/uL    Absolute Eos # 0.43 0.00 - 0.44 k/uL    Basophils Absolute <0.03 0.00 - 0.20 k/uL    Absolute Immature Granulocyte <0.03 0.00 - 0.30 k/uL       :      1. Eosinophilic asthma    2. Poorly controlled severe persistent asthma without complication    3. Multiple environmental allergies    4. Severe persistent asthma, poorly-controlled, with acute exacerbation      Patient Active Problem List   Diagnosis    Gastroesophageal reflux disease without esophagitis    Essential hypertension    Vulvar dystrophy    Skin sensitivity    Severe persistent asthma without complication    Mixed hyperlipidemia    Age-related osteoporosis without current pathological fracture    Diet-controlled diabetes mellitus ([facility] [address]         Plan:      Continue bronchodilators  Refilled Spiriva. First Nucala injection today. Patient prefers to come to the office. \"I cannot give myself a shot. \"  Discussed risks, benefits, and rationale of biologic therapy and she accepts. Discussed strategies for management of asthma, including evaluation and descalation of therapy  Avoid environmental triggers  Return to clinic in 4 months    Orders Placed This Encounter   Medications    tiotropium (SPIRIVA RESPIMAT) 1.25 MCG/ACT AERS inhaler     Sig: Inhale 2 puffs into the lungs daily     Dispense:  3 each     Refill:  3     No orders of the defined types were placed in this encounter. Return in about 4 months (around 6/6/2023).        Electronically signed by Venice Palomo DO on 2/6/2023at 2:08 PM

## 2023-03-06 ENCOUNTER — OFFICE VISIT (OUTPATIENT)
Dept: PULMONOLOGY | Age: 68
End: 2023-03-06
Payer: MEDICARE

## 2023-03-06 VITALS
RESPIRATION RATE: 16 BRPM | HEART RATE: 87 BPM | SYSTOLIC BLOOD PRESSURE: 146 MMHG | DIASTOLIC BLOOD PRESSURE: 77 MMHG | WEIGHT: 122 LBS | OXYGEN SATURATION: 98 % | BODY MASS INDEX: 24.6 KG/M2 | HEIGHT: 59 IN

## 2023-03-06 DIAGNOSIS — J82.83 EOSINOPHILIC ASTHMA: Primary | ICD-10-CM

## 2023-03-06 PROCEDURE — 96372 THER/PROPH/DIAG INJ SC/IM: CPT | Performed by: INTERNAL MEDICINE

## 2023-03-06 NOTE — PROGRESS NOTES
After obtaining consent, and per orders of Dr. Marilee Sam, injection of Nucula given in Left arm by Usha Garduno MA. Patient instructed to remain in clinic for 20 minutes afterwards, and to report any adverse reaction to me immediately.   Patient Supplied

## 2023-03-26 DIAGNOSIS — I10 ESSENTIAL HYPERTENSION: ICD-10-CM

## 2023-03-27 RX ORDER — LOSARTAN POTASSIUM 50 MG/1
50 TABLET ORAL DAILY
Qty: 90 TABLET | Refills: 1 | Status: SHIPPED | OUTPATIENT
Start: 2023-03-27

## 2023-04-03 ENCOUNTER — OFFICE VISIT (OUTPATIENT)
Dept: PULMONOLOGY | Age: 68
End: 2023-04-03
Payer: MEDICARE

## 2023-04-03 VITALS
HEART RATE: 84 BPM | BODY MASS INDEX: 24.39 KG/M2 | SYSTOLIC BLOOD PRESSURE: 139 MMHG | WEIGHT: 121 LBS | TEMPERATURE: 97 F | DIASTOLIC BLOOD PRESSURE: 74 MMHG | HEIGHT: 59 IN

## 2023-04-03 DIAGNOSIS — J45.51 SEVERE PERSISTENT ASTHMA, POORLY-CONTROLLED, WITH ACUTE EXACERBATION: Primary | ICD-10-CM

## 2023-04-03 PROCEDURE — 96372 THER/PROPH/DIAG INJ SC/IM: CPT | Performed by: INTERNAL MEDICINE

## 2023-04-03 NOTE — PROGRESS NOTES
After obtaining consent, and per orders of Dr. Tamika Cisneros, injection of Nucala given in Right arm by Galilea Sepulveda MA. Patient instructed to remain in clinic for 20 minutes afterwards, and to report any adverse reaction to me immediately. Patient Supplied.

## 2023-04-05 ENCOUNTER — HOSPITAL ENCOUNTER (OUTPATIENT)
Dept: MAMMOGRAPHY | Age: 68
Discharge: HOME | End: 2023-04-07
Payer: MEDICARE

## 2023-04-05 DIAGNOSIS — Z12.31 ENCOUNTER FOR SCREENING MAMMOGRAM FOR MALIGNANT NEOPLASM OF BREAST: ICD-10-CM

## 2023-04-05 PROCEDURE — 77063 BREAST TOMOSYNTHESIS BI: CPT

## 2023-05-03 ENCOUNTER — TELEPHONE (OUTPATIENT)
Dept: PHARMACY | Facility: CLINIC | Age: 68
End: 2023-05-03

## 2023-05-03 NOTE — TELEPHONE ENCOUNTER
[facility] CLINICAL PHARMACY: ADHERENCE REVIEW  Identified care gap per [place] fills with [facility]: ACE/ARB and Statin adherence      Recent Visits  Date Type Provider Dept   01/23/23 Office Visit TAIWO Bell CNP [facility]   12/22/22 Office Visit TAIWO Bell CNP [facility]   02/11/22 Office Visit TAIWO Degroot CNP [facility]   Showing recent visits within past 540 days with a meds authorizing provider and meeting all other requirements  Future Appointments  Date Type Provider Dept   05/15/23 Appointment TAIWO Bell CNP [facility]   Showing future appointments within next 150 days with a meds authorizing provider and meeting all other requirements    Patient also appears to be prescribed:No Others in the metric at this time  Per insurer report, LIS-0 - co-pays are based on tiers and patient is subject to coverage gap. ASSESSMENT    ACE/ARB ADHERENCE    Insurance Records claims through 04/24/2023 (Prior Year [place] = not reported; YTD [place] = FIRST FILL; Potential Fail Date: 06.30.23):   Losartan last filled on 01.23.23 for 90 day supply. Next refill due: 04.23.23    Per Insurer Portal: last filled on 04.26.23 for 100 day supply. BP Readings from Last 3 Encounters:   04/03/23 139/74   03/06/23 (!) 146/77   02/06/23 138/81     Estimated Creatinine Clearance: 45 mL/min (A) (based on SCr of 0.91 mg/dL (H)). Lab Results   Component Value Date    CREATININE 0.91 (H) 12/22/2022     Lab Results   Component Value Date    K 3.7 12/22/2022     STATIN ADHERENCE    Insurance Records claims through 04/24/2023 (Prior Year [place] = not reported; YTD [place] = FIRST FILL; Potential Fail Date: 06.13.23): Atorvastatin last filled on 01.01.23 for 90 day supply. Next refill due: 04.01.23    Per Insurer Portal: last filled on (same as above).       Lab Results   Component Value Date    CHOL 260 (H) 10/19/2019    TRIG 138 10/19/2019    HDL 46 12/22/2022

## 2023-05-06 ENCOUNTER — HOSPITAL ENCOUNTER (OUTPATIENT)
Age: 68
Discharge: HOME | End: 2023-05-06
Payer: MEDICARE

## 2023-05-06 DIAGNOSIS — E11.9 DIET-CONTROLLED DIABETES MELLITUS (HCC): ICD-10-CM

## 2023-05-06 LAB
CREATININE URINE: 103 MG/DL (ref 28–217)
MICROALBUMIN/CREAT 24H UR: <12 MG/L
MICROALBUMIN/CREAT UR-RTO: NORMAL MCG/MG CREAT

## 2023-05-06 PROCEDURE — 36415 COLL VENOUS BLD VENIPUNCTURE: CPT

## 2023-05-06 PROCEDURE — 83036 HEMOGLOBIN GLYCOSYLATED A1C: CPT

## 2023-05-06 PROCEDURE — 82570 ASSAY OF URINE CREATININE: CPT

## 2023-05-06 PROCEDURE — 82043 UR ALBUMIN QUANTITATIVE: CPT

## 2023-05-07 LAB
EST. AVERAGE GLUCOSE BLD GHB EST-MCNC: 134 MG/DL
HBA1C MFR BLD: 6.3 % (ref 4–6)

## 2023-05-08 ENCOUNTER — OFFICE VISIT (OUTPATIENT)
Dept: PULMONOLOGY | Age: 68
End: 2023-05-08
Payer: MEDICARE

## 2023-05-08 VITALS
OXYGEN SATURATION: 95 % | WEIGHT: 119 LBS | HEART RATE: 85 BPM | HEIGHT: 59 IN | SYSTOLIC BLOOD PRESSURE: 131 MMHG | DIASTOLIC BLOOD PRESSURE: 77 MMHG | BODY MASS INDEX: 23.99 KG/M2

## 2023-05-08 DIAGNOSIS — K21.9 GASTROESOPHAGEAL REFLUX DISEASE WITHOUT ESOPHAGITIS: ICD-10-CM

## 2023-05-08 DIAGNOSIS — J45.51 SEVERE PERSISTENT ASTHMA, POORLY-CONTROLLED, WITH ACUTE EXACERBATION: Primary | ICD-10-CM

## 2023-05-08 PROCEDURE — 96372 THER/PROPH/DIAG INJ SC/IM: CPT | Performed by: INTERNAL MEDICINE

## 2023-05-08 RX ORDER — OMEPRAZOLE 20 MG/1
20 CAPSULE, DELAYED RELEASE ORAL DAILY
Qty: 90 CAPSULE | Refills: 1 | Status: SHIPPED | OUTPATIENT
Start: 2023-05-08

## 2023-05-08 RX ORDER — TIOTROPIUM BROMIDE INHALATION SPRAY 1.56 UG/1
2 SPRAY, METERED RESPIRATORY (INHALATION) DAILY
Qty: 3 EACH | Refills: 3 | Status: SHIPPED | OUTPATIENT
Start: 2023-05-08 | End: 2023-08-06

## 2023-05-08 RX ORDER — BUDESONIDE AND FORMOTEROL FUMARATE DIHYDRATE 160; 4.5 UG/1; UG/1
2 AEROSOL RESPIRATORY (INHALATION) 2 TIMES DAILY
Qty: 6 EACH | Refills: 3 | Status: SHIPPED | OUTPATIENT
Start: 2023-05-08 | End: 2023-08-06

## 2023-05-08 NOTE — PROGRESS NOTES
After obtaining consent, and per orders of Dr. Melania Vázquez, injection of Nucala given in Left arm by Marilynn Jacobo MA. Patient instructed to remain in clinic for 20 minutes afterwards, and to report any adverse reaction to me immediately.    Patient supplied medication  No reaction

## 2023-05-08 NOTE — TELEPHONE ENCOUNTER
Nazario Wolf is calling to request a refill on the following medication(s):    Medication Request:  Requested Prescriptions     Pending Prescriptions Disp Refills    omeprazole (PRILOSEC) 20 MG delayed release capsule 90 capsule 1     Sig: Take 1 capsule by mouth daily       Last Visit Date (If Applicable):  1/75/9433    Next Visit Date:    5/15/2023

## 2023-05-08 NOTE — PATIENT INSTRUCTIONS
5/8/23-Form to refill/ship Nucala through the assistance program is on the doctors' desk for signature. Writer is tracking. Jasmine Holt  5/10/23-Refill/ship form faxed. Edie Llamas

## 2023-05-15 ENCOUNTER — OFFICE VISIT (OUTPATIENT)
Dept: FAMILY MEDICINE CLINIC | Age: 68
End: 2023-05-15
Payer: MEDICARE

## 2023-05-15 VITALS
DIASTOLIC BLOOD PRESSURE: 78 MMHG | WEIGHT: 119 LBS | OXYGEN SATURATION: 97 % | HEART RATE: 94 BPM | BODY MASS INDEX: 24.04 KG/M2 | TEMPERATURE: 97.4 F | SYSTOLIC BLOOD PRESSURE: 126 MMHG

## 2023-05-15 DIAGNOSIS — E78.2 MIXED HYPERLIPIDEMIA: ICD-10-CM

## 2023-05-15 DIAGNOSIS — Z00.00 MEDICARE ANNUAL WELLNESS VISIT, SUBSEQUENT: Primary | ICD-10-CM

## 2023-05-15 DIAGNOSIS — M81.0 AGE-RELATED OSTEOPOROSIS WITHOUT CURRENT PATHOLOGICAL FRACTURE: ICD-10-CM

## 2023-05-15 DIAGNOSIS — I10 ESSENTIAL HYPERTENSION: ICD-10-CM

## 2023-05-15 DIAGNOSIS — E11.9 DIET-CONTROLLED DIABETES MELLITUS (HCC): ICD-10-CM

## 2023-05-15 DIAGNOSIS — R41.3 MEMORY CHANGES: ICD-10-CM

## 2023-05-15 PROCEDURE — 3074F SYST BP LT 130 MM HG: CPT | Performed by: NURSE PRACTITIONER

## 2023-05-15 PROCEDURE — 99214 OFFICE O/P EST MOD 30 MIN: CPT | Performed by: NURSE PRACTITIONER

## 2023-05-15 PROCEDURE — 3078F DIAST BP <80 MM HG: CPT | Performed by: NURSE PRACTITIONER

## 2023-05-15 PROCEDURE — 2022F DILAT RTA XM EVC RTNOPTHY: CPT | Performed by: NURSE PRACTITIONER

## 2023-05-15 PROCEDURE — 3017F COLORECTAL CA SCREEN DOC REV: CPT | Performed by: NURSE PRACTITIONER

## 2023-05-15 PROCEDURE — G0439 PPPS, SUBSEQ VISIT: HCPCS | Performed by: NURSE PRACTITIONER

## 2023-05-15 PROCEDURE — 1090F PRES/ABSN URINE INCON ASSESS: CPT | Performed by: NURSE PRACTITIONER

## 2023-05-15 PROCEDURE — 1123F ACP DISCUSS/DSCN MKR DOCD: CPT | Performed by: NURSE PRACTITIONER

## 2023-05-15 PROCEDURE — 3044F HG A1C LEVEL LT 7.0%: CPT | Performed by: NURSE PRACTITIONER

## 2023-05-15 PROCEDURE — G8399 PT W/DXA RESULTS DOCUMENT: HCPCS | Performed by: NURSE PRACTITIONER

## 2023-05-15 PROCEDURE — G8420 CALC BMI NORM PARAMETERS: HCPCS | Performed by: NURSE PRACTITIONER

## 2023-05-15 PROCEDURE — G8427 DOCREV CUR MEDS BY ELIG CLIN: HCPCS | Performed by: NURSE PRACTITIONER

## 2023-05-15 PROCEDURE — 1036F TOBACCO NON-USER: CPT | Performed by: NURSE PRACTITIONER

## 2023-05-15 RX ORDER — ATORVASTATIN CALCIUM 20 MG/1
20 TABLET, FILM COATED ORAL DAILY
Qty: 90 TABLET | Refills: 3 | Status: SHIPPED | OUTPATIENT
Start: 2023-05-15

## 2023-05-15 ASSESSMENT — ENCOUNTER SYMPTOMS
NAUSEA: 0
CHEST TIGHTNESS: 0
COUGH: 0
VOMITING: 0
ALLERGIC/IMMUNOLOGIC NEGATIVE: 1
GASTROINTESTINAL NEGATIVE: 1
SHORTNESS OF BREATH: 0
EYES NEGATIVE: 1
RESPIRATORY NEGATIVE: 1
COLOR CHANGE: 0
DIARRHEA: 0

## 2023-05-15 ASSESSMENT — LIFESTYLE VARIABLES
HOW OFTEN DO YOU HAVE A DRINK CONTAINING ALCOHOL: NEVER
HOW MANY STANDARD DRINKS CONTAINING ALCOHOL DO YOU HAVE ON A TYPICAL DAY: PATIENT DOES NOT DRINK

## 2023-05-15 ASSESSMENT — PATIENT HEALTH QUESTIONNAIRE - PHQ9
SUM OF ALL RESPONSES TO PHQ QUESTIONS 1-9: 0
SUM OF ALL RESPONSES TO PHQ QUESTIONS 1-9: 0
SUM OF ALL RESPONSES TO PHQ9 QUESTIONS 1 & 2: 0
2. FEELING DOWN, DEPRESSED OR HOPELESS: 0
1. LITTLE INTEREST OR PLEASURE IN DOING THINGS: 0
SUM OF ALL RESPONSES TO PHQ QUESTIONS 1-9: 0
SUM OF ALL RESPONSES TO PHQ QUESTIONS 1-9: 0

## 2023-05-15 NOTE — PATIENT INSTRUCTIONS

## 2023-05-15 NOTE — PROGRESS NOTES
blister, skin breakdown, erythema, warmth, callus, dry skin or fissure. Toenail Condition: Left toenails are normal.      Comments: Sensation intact with monofilament testing   Skin:     General: Skin is warm and dry. Coloration: Skin is not pale. Findings: No erythema or rash. Neurological:      Mental Status: She is alert and oriented to person, place, and time. GCS: GCS eye subscore is 4. GCS verbal subscore is 5. GCS motor subscore is 6. Gait: Gait is intact. Gait normal.   Psychiatric:         Attention and Perception: Attention and perception normal.         Mood and Affect: Mood and affect normal.         Speech: Speech normal.         Behavior: Behavior normal.         Thought Content: Thought content normal.         Cognition and Memory: Cognition and memory normal.         Judgment: Judgment normal.         Assessment:         1. Medicare annual wellness visit, subsequent    2. Diet-controlled diabetes mellitus ([facility] [address]    3. Essential hypertension    4. Mixed hyperlipidemia    5. Age-related osteoporosis without current pathological fracture    6. Memory changes        Plan:     1. Medicare annual wellness visit, subsequent      2. Diet-controlled diabetes mellitus ([facility] [address]    - CBC with Auto Differential; Future  - Comprehensive Metabolic Panel; Future  - Lipid, Fasting; Future  - Hemoglobin A1C; Future  -  DIABETES FOOT EXAM    At goal, continue lifestyle modifications    3. Essential hypertension    - CBC with Auto Differential; Future  - Comprehensive Metabolic Panel; Future  - Magnesium; Future    BP controlled, continue med  Update labs when due  Lifestyle modifications     4. Mixed hyperlipidemia    - atorvastatin (LIPITOR) 20 MG tablet; Take 1 tablet by mouth daily  Dispense: 90 tablet; Refill: 3  - Lipid, Fasting; Future    5. Age-related osteoporosis without current pathological fracture    On Fosamax     6.  Memory changes    - CBC with Auto Differential; Future  -

## 2023-05-29 DIAGNOSIS — M81.0 AGE-RELATED OSTEOPOROSIS WITHOUT CURRENT PATHOLOGICAL FRACTURE: ICD-10-CM

## 2023-05-30 RX ORDER — ALENDRONATE SODIUM 70 MG/1
TABLET ORAL
Qty: 12 TABLET | Refills: 3 | Status: SHIPPED | OUTPATIENT
Start: 2023-05-30

## 2023-06-01 ENCOUNTER — TELEPHONE (OUTPATIENT)
Dept: PULMONOLOGY | Age: 68
End: 2023-06-01

## 2023-07-03 NOTE — TELEPHONE ENCOUNTER
Kira didn't arrive, called them, they dont know what happened but will re-triage it. Due to arrive 7/6 now.      [address] [facility] distribution ph #[phone], opt 5 leave a message to check on the order

## 2023-07-04 DIAGNOSIS — I10 ESSENTIAL HYPERTENSION: ICD-10-CM

## 2023-07-05 RX ORDER — LOSARTAN POTASSIUM 50 MG/1
50 TABLET ORAL DAILY
Qty: 100 TABLET | Refills: 0 | Status: SHIPPED | OUTPATIENT
Start: 2023-07-05

## 2023-07-05 NOTE — TELEPHONE ENCOUNTER
Domitila Peck is calling to request a refill on the following medication(s):    Medication Request:  Requested Prescriptions     Pending Prescriptions Disp Refills    losartan (COZAAR) 50 MG tablet [Pharmacy Med Name: Losartan Potassium 50 MG Oral Tablet] 100 tablet 2     Sig: TAKE 1 TABLET BY MOUTH DAILY       Last Visit Date (If Applicable):  0/71/9724    Next Visit Date:    11/20/2023

## 2023-07-10 ENCOUNTER — OFFICE VISIT (OUTPATIENT)
Dept: PULMONOLOGY | Age: 68
End: 2023-07-10
Payer: MEDICARE

## 2023-07-10 VITALS
DIASTOLIC BLOOD PRESSURE: 73 MMHG | BODY MASS INDEX: 24.39 KG/M2 | HEIGHT: 59 IN | SYSTOLIC BLOOD PRESSURE: 125 MMHG | RESPIRATION RATE: 16 BRPM | HEART RATE: 75 BPM | WEIGHT: 121 LBS

## 2023-07-10 DIAGNOSIS — Z91.09 MULTIPLE ENVIRONMENTAL ALLERGIES: ICD-10-CM

## 2023-07-10 DIAGNOSIS — J45.50 SEVERE PERSISTENT ASTHMA, WELL-CONTROLLED: Primary | ICD-10-CM

## 2023-07-10 DIAGNOSIS — K21.9 GASTROESOPHAGEAL REFLUX DISEASE, UNSPECIFIED WHETHER ESOPHAGITIS PRESENT: ICD-10-CM

## 2023-07-10 PROCEDURE — 3017F COLORECTAL CA SCREEN DOC REV: CPT | Performed by: INTERNAL MEDICINE

## 2023-07-10 PROCEDURE — 3078F DIAST BP <80 MM HG: CPT | Performed by: INTERNAL MEDICINE

## 2023-07-10 PROCEDURE — G8427 DOCREV CUR MEDS BY ELIG CLIN: HCPCS | Performed by: INTERNAL MEDICINE

## 2023-07-10 PROCEDURE — G8420 CALC BMI NORM PARAMETERS: HCPCS | Performed by: INTERNAL MEDICINE

## 2023-07-10 PROCEDURE — 3074F SYST BP LT 130 MM HG: CPT | Performed by: INTERNAL MEDICINE

## 2023-07-10 PROCEDURE — 96372 THER/PROPH/DIAG INJ SC/IM: CPT | Performed by: INTERNAL MEDICINE

## 2023-07-10 PROCEDURE — 99213 OFFICE O/P EST LOW 20 MIN: CPT | Performed by: INTERNAL MEDICINE

## 2023-07-10 PROCEDURE — G8399 PT W/DXA RESULTS DOCUMENT: HCPCS | Performed by: INTERNAL MEDICINE

## 2023-07-10 PROCEDURE — 1123F ACP DISCUSS/DSCN MKR DOCD: CPT | Performed by: INTERNAL MEDICINE

## 2023-07-10 PROCEDURE — 1090F PRES/ABSN URINE INCON ASSESS: CPT | Performed by: INTERNAL MEDICINE

## 2023-07-10 PROCEDURE — 1036F TOBACCO NON-USER: CPT | Performed by: INTERNAL MEDICINE

## 2023-07-10 ASSESSMENT — ENCOUNTER SYMPTOMS
EYES NEGATIVE: 1
WHEEZING: 1
GASTROINTESTINAL NEGATIVE: 1
SHORTNESS OF BREATH: 1
COUGH: 1
CHEST TIGHTNESS: 1

## 2023-07-10 NOTE — PROGRESS NOTES
After obtaining consent, and per orders of Dr. Vivek Torres, injection of Nucala given in Left deltoid by Jessika Peterson MA. Patient instructed to remain in clinic for 20 minutes afterwards, and to report any adverse reaction to me immediately. No Complications
clotrimazole-betamethasone (LOTRISONE) 1-0.05 % cream APPLY TO AFFECTED AREA(S) TWO TIMES A DAY for up to 2 weeks 45 g 3    montelukast (SINGULAIR) 10 MG tablet Take 1 tablet by mouth nightly 90 tablet 3    albuterol sulfate HFA (PROVENTIL HFA) 108 (90 Base) MCG/ACT inhaler Inhale 2 puffs into the lungs every 6 hours as needed for Wheezing or Shortness of Breath 3 each 3    mepolizumab (NUCALA) 100 MG SOLR injection Inject 1 mL into the skin every 30 days 1 each 11    Cyanocobalamin (VITAMIN B-12 PO) Take by mouth      turmeric 500 MG CAPS Take by mouth daily      Multiple Vitamin (MULTI-VITAMIN DAILY PO) Take by mouth      EPINEPHrine (EPIPEN 2-MEKHI) 0.3 MG/0.3ML SOAJ injection Inject 0.3 mLs into the muscle once for 1 dose Use as directed for allergic reaction 0.3 mL 0     No current facility-administered medications for this visit. Physical Exam  Vitals and nursing note reviewed. Constitutional:       Appearance: She is well-developed. HENT:      Head: Normocephalic. Nose: Nose normal. No congestion. Mouth/Throat:      Mouth: Mucous membranes are moist.      Pharynx: Oropharynx is clear. No oropharyngeal exudate. Eyes:      General: No scleral icterus. Conjunctiva/sclera: Conjunctivae normal.   Neck:      Thyroid: No thyromegaly. Vascular: No JVD. Trachea: No tracheal deviation. Cardiovascular:      Rate and Rhythm: Normal rate and regular rhythm. Heart sounds: Normal heart sounds. No murmur heard. No gallop. Pulmonary:      Effort: Pulmonary effort is normal. No respiratory distress. Breath sounds: No wheezing or rales. Chest:      Chest wall: No tenderness. Abdominal:      Palpations: Abdomen is soft. Tenderness: There is no abdominal tenderness. Musculoskeletal:      Cervical back: Neck supple. Right lower leg: No edema. Left lower leg: No edema. Lymphadenopathy:      Cervical: No cervical adenopathy.    Skin:     General: Skin is warm

## 2023-07-16 DIAGNOSIS — K21.9 GASTROESOPHAGEAL REFLUX DISEASE WITHOUT ESOPHAGITIS: ICD-10-CM

## 2023-07-17 RX ORDER — OMEPRAZOLE 20 MG/1
20 CAPSULE, DELAYED RELEASE ORAL DAILY
Qty: 90 CAPSULE | Refills: 3 | Status: SHIPPED | OUTPATIENT
Start: 2023-07-17

## 2023-07-17 NOTE — TELEPHONE ENCOUNTER
Tajadeola Plush is calling to request a refill on the following medication(s):    Medication Request:  Requested Prescriptions     Pending Prescriptions Disp Refills    omeprazole (PRILOSEC) 20 MG delayed release capsule [Pharmacy Med Name: Omeprazole 20 MG Oral Capsule Delayed Release] 100 capsule 2     Sig: TAKE 1 CAPSULE BY MOUTH DAILY       Last Visit Date (If Applicable):  2/10/5731    Next Visit Date:    11/20/2023

## 2023-08-07 ENCOUNTER — OFFICE VISIT (OUTPATIENT)
Dept: PULMONOLOGY | Age: 68
End: 2023-08-07
Payer: MEDICARE

## 2023-08-07 VITALS
DIASTOLIC BLOOD PRESSURE: 71 MMHG | HEART RATE: 85 BPM | BODY MASS INDEX: 23.39 KG/M2 | OXYGEN SATURATION: 100 % | RESPIRATION RATE: 16 BRPM | SYSTOLIC BLOOD PRESSURE: 122 MMHG | WEIGHT: 116 LBS | HEIGHT: 59 IN

## 2023-08-07 DIAGNOSIS — J45.50 SEVERE PERSISTENT ASTHMA, WELL-CONTROLLED: Primary | ICD-10-CM

## 2023-08-07 PROCEDURE — 96372 THER/PROPH/DIAG INJ SC/IM: CPT | Performed by: INTERNAL MEDICINE

## 2023-08-07 PROCEDURE — 99213 OFFICE O/P EST LOW 20 MIN: CPT | Performed by: INTERNAL MEDICINE

## 2023-08-07 NOTE — PROGRESS NOTES
After obtaining consent, and per orders of Dr. Juancho Bradley, injection of Nucala  given in Left arm by David Olmedo MA. Patient instructed to remain in clinic for 20 minutes afterwards, and to report any adverse reaction to me immediately.   No complications

## 2023-09-11 ENCOUNTER — OFFICE VISIT (OUTPATIENT)
Dept: PULMONOLOGY | Age: 68
End: 2023-09-11
Payer: MEDICARE

## 2023-09-11 VITALS
RESPIRATION RATE: 18 BRPM | DIASTOLIC BLOOD PRESSURE: 73 MMHG | SYSTOLIC BLOOD PRESSURE: 145 MMHG | HEIGHT: 59 IN | HEART RATE: 81 BPM | TEMPERATURE: 97 F | BODY MASS INDEX: 23.59 KG/M2 | WEIGHT: 117 LBS | OXYGEN SATURATION: 98 %

## 2023-09-11 DIAGNOSIS — J45.50 SEVERE PERSISTENT ASTHMA, WELL-CONTROLLED: Primary | ICD-10-CM

## 2023-09-11 PROCEDURE — 96372 THER/PROPH/DIAG INJ SC/IM: CPT | Performed by: INTERNAL MEDICINE

## 2023-09-11 NOTE — PROGRESS NOTES
After obtaining consent, and per orders of Dr. Tere Hollis, injection of Nucala given in Left arm by Earline Jane MA. Patient instructed to remain in clinic for 20 minutes afterwards, and to report any adverse reaction to me immediately.

## 2023-10-15 DIAGNOSIS — I10 ESSENTIAL HYPERTENSION: ICD-10-CM

## 2023-10-16 ENCOUNTER — OFFICE VISIT (OUTPATIENT)
Dept: PULMONOLOGY | Age: 68
End: 2023-10-16
Payer: MEDICARE

## 2023-10-16 VITALS
DIASTOLIC BLOOD PRESSURE: 70 MMHG | OXYGEN SATURATION: 98 % | HEART RATE: 80 BPM | RESPIRATION RATE: 16 BRPM | SYSTOLIC BLOOD PRESSURE: 126 MMHG | HEIGHT: 59 IN | BODY MASS INDEX: 24.19 KG/M2 | WEIGHT: 120 LBS

## 2023-10-16 DIAGNOSIS — J45.50 SEVERE PERSISTENT ASTHMA, WELL-CONTROLLED: Primary | ICD-10-CM

## 2023-10-16 PROCEDURE — 96372 THER/PROPH/DIAG INJ SC/IM: CPT | Performed by: INTERNAL MEDICINE

## 2023-10-16 RX ORDER — LOSARTAN POTASSIUM 50 MG/1
50 TABLET ORAL DAILY
Qty: 90 TABLET | Refills: 1 | Status: SHIPPED | OUTPATIENT
Start: 2023-10-16

## 2023-10-16 NOTE — PROGRESS NOTES
After obtaining consent, and per orders of Dr. Niharika Alejandre, injection of Nucala given in Right arm by Ryanne Chowdhury MA. Patient instructed to remain in clinic for 20 minutes afterwards, and to report any adverse reaction to me immediately.

## 2023-10-16 NOTE — TELEPHONE ENCOUNTER
Nazario Hearn is calling to request a refill on the following medication(s):    Medication Request:  Requested Prescriptions     Pending Prescriptions Disp Refills    losartan (COZAAR) 50 MG tablet [Pharmacy Med Name: Losartan Potassium 50 MG Oral Tablet] 90 tablet 1     Sig: TAKE 1 TABLET BY MOUTH DAILY       Last Visit Date (If Applicable):  2-37-66    Next Visit Date:    11-20-23

## 2023-11-13 ENCOUNTER — TELEPHONE (OUTPATIENT)
Dept: PULMONOLOGY | Age: 68
End: 2023-11-13

## 2023-11-15 NOTE — TELEPHONE ENCOUNTER
Abel Samples came in
Called [facility] again due to us not receiving Nucula yesterday and they stated that it is due to be delivered today
Spoke with [facility] to [facility] and they stated that the medication was never shipped and they will get in the mail to be here tomorrow.   I spoke with patient and switched her appt to Friday morning
independent

## 2023-11-17 ENCOUNTER — OFFICE VISIT (OUTPATIENT)
Dept: PULMONOLOGY | Age: 68
End: 2023-11-17
Payer: MEDICARE

## 2023-11-17 VITALS
HEART RATE: 94 BPM | WEIGHT: 119 LBS | HEIGHT: 59 IN | RESPIRATION RATE: 16 BRPM | BODY MASS INDEX: 23.99 KG/M2 | DIASTOLIC BLOOD PRESSURE: 82 MMHG | SYSTOLIC BLOOD PRESSURE: 153 MMHG

## 2023-11-17 DIAGNOSIS — J45.50 SEVERE PERSISTENT ASTHMA, WELL-CONTROLLED: Primary | ICD-10-CM

## 2023-11-17 PROCEDURE — 96372 THER/PROPH/DIAG INJ SC/IM: CPT | Performed by: INTERNAL MEDICINE

## 2023-11-17 NOTE — PROGRESS NOTES
After obtaining consent, and per orders of Dr. Lukas Cantrell, injection of Nicala  given in Right arm by Charlotte Power MA. Patient instructed to remain in clinic for 20 minutes afterwards, and to report any adverse reaction to me immediately.      Patient Supplied

## 2023-11-20 ENCOUNTER — OFFICE VISIT (OUTPATIENT)
Dept: FAMILY MEDICINE CLINIC | Age: 68
End: 2023-11-20
Payer: MEDICARE

## 2023-11-20 VITALS
HEART RATE: 86 BPM | OXYGEN SATURATION: 96 % | BODY MASS INDEX: 24.03 KG/M2 | WEIGHT: 119.2 LBS | TEMPERATURE: 97.3 F | HEIGHT: 59 IN | SYSTOLIC BLOOD PRESSURE: 120 MMHG | DIASTOLIC BLOOD PRESSURE: 72 MMHG

## 2023-11-20 DIAGNOSIS — K21.9 GASTROESOPHAGEAL REFLUX DISEASE WITHOUT ESOPHAGITIS: ICD-10-CM

## 2023-11-20 DIAGNOSIS — E11.9 DIET-CONTROLLED DIABETES MELLITUS (HCC): ICD-10-CM

## 2023-11-20 DIAGNOSIS — E78.2 MIXED HYPERLIPIDEMIA: ICD-10-CM

## 2023-11-20 DIAGNOSIS — J45.50 SEVERE PERSISTENT ASTHMA WITHOUT COMPLICATION: ICD-10-CM

## 2023-11-20 DIAGNOSIS — I10 ESSENTIAL HYPERTENSION: Primary | ICD-10-CM

## 2023-11-20 PROCEDURE — 3017F COLORECTAL CA SCREEN DOC REV: CPT | Performed by: NURSE PRACTITIONER

## 2023-11-20 PROCEDURE — G8484 FLU IMMUNIZE NO ADMIN: HCPCS | Performed by: NURSE PRACTITIONER

## 2023-11-20 PROCEDURE — 1090F PRES/ABSN URINE INCON ASSESS: CPT | Performed by: NURSE PRACTITIONER

## 2023-11-20 PROCEDURE — G8399 PT W/DXA RESULTS DOCUMENT: HCPCS | Performed by: NURSE PRACTITIONER

## 2023-11-20 PROCEDURE — G0008 ADMIN INFLUENZA VIRUS VAC: HCPCS | Performed by: INTERNAL MEDICINE

## 2023-11-20 PROCEDURE — G8420 CALC BMI NORM PARAMETERS: HCPCS | Performed by: NURSE PRACTITIONER

## 2023-11-20 PROCEDURE — 99214 OFFICE O/P EST MOD 30 MIN: CPT | Performed by: NURSE PRACTITIONER

## 2023-11-20 PROCEDURE — 1036F TOBACCO NON-USER: CPT | Performed by: NURSE PRACTITIONER

## 2023-11-20 PROCEDURE — 1123F ACP DISCUSS/DSCN MKR DOCD: CPT | Performed by: NURSE PRACTITIONER

## 2023-11-20 PROCEDURE — 3044F HG A1C LEVEL LT 7.0%: CPT | Performed by: NURSE PRACTITIONER

## 2023-11-20 PROCEDURE — G8427 DOCREV CUR MEDS BY ELIG CLIN: HCPCS | Performed by: NURSE PRACTITIONER

## 2023-11-20 PROCEDURE — 3078F DIAST BP <80 MM HG: CPT | Performed by: NURSE PRACTITIONER

## 2023-11-20 PROCEDURE — 90694 VACC AIIV4 NO PRSRV 0.5ML IM: CPT | Performed by: INTERNAL MEDICINE

## 2023-11-20 PROCEDURE — 2022F DILAT RTA XM EVC RTNOPTHY: CPT | Performed by: NURSE PRACTITIONER

## 2023-11-20 PROCEDURE — 3074F SYST BP LT 130 MM HG: CPT | Performed by: NURSE PRACTITIONER

## 2023-11-20 RX ORDER — CLOTRIMAZOLE AND BETAMETHASONE DIPROPIONATE 10; .64 MG/G; MG/G
CREAM TOPICAL
Qty: 45 G | Refills: 1 | Status: SHIPPED | OUTPATIENT
Start: 2023-11-20

## 2023-11-20 SDOH — ECONOMIC STABILITY: FOOD INSECURITY: WITHIN THE PAST 12 MONTHS, YOU WORRIED THAT YOUR FOOD WOULD RUN OUT BEFORE YOU GOT MONEY TO BUY MORE.: NEVER TRUE

## 2023-11-20 SDOH — ECONOMIC STABILITY: FOOD INSECURITY: WITHIN THE PAST 12 MONTHS, THE FOOD YOU BOUGHT JUST DIDN'T LAST AND YOU DIDN'T HAVE MONEY TO GET MORE.: NEVER TRUE

## 2023-11-20 SDOH — ECONOMIC STABILITY: HOUSING INSECURITY
IN THE LAST 12 MONTHS, WAS THERE A TIME WHEN YOU DID NOT HAVE A STEADY PLACE TO SLEEP OR SLEPT IN A SHELTER (INCLUDING NOW)?: NO

## 2023-11-20 SDOH — ECONOMIC STABILITY: INCOME INSECURITY: HOW HARD IS IT FOR YOU TO PAY FOR THE VERY BASICS LIKE FOOD, HOUSING, MEDICAL CARE, AND HEATING?: NOT HARD AT ALL

## 2023-11-20 ASSESSMENT — ENCOUNTER SYMPTOMS
EYES NEGATIVE: 1
COUGH: 0
NAUSEA: 0
SHORTNESS OF BREATH: 0
VOMITING: 0
CHEST TIGHTNESS: 0
WHEEZING: 0
DIARRHEA: 0
RESPIRATORY NEGATIVE: 1
ALLERGIC/IMMUNOLOGIC NEGATIVE: 1
GASTROINTESTINAL NEGATIVE: 1
COLOR CHANGE: 0

## 2023-11-20 NOTE — PROGRESS NOTES
hyperlipidemia    4. Severe persistent asthma without complication    5. Gastroesophageal reflux disease without esophagitis        Plan:     1. Essential hypertension    - CBC with Auto Differential; Future  - Comprehensive Metabolic Panel; Future  - Lipid, Fasting; Future  - Magnesium; Future  - TSH with Reflex; Future'    BP controlled, continue med  Update labs  Lifestyle modifications     2. Diet-controlled diabetes mellitus ([address])    - CBC with Auto Differential; Future  - Comprehensive Metabolic Panel; Future  - Lipid, Fasting; Future  - Hemoglobin A1C; Future  - Vitamin B12 & Folate; Future    3. Mixed hyperlipidemia    - Lipid, Fasting; Future    4. Severe persistent asthma without complication    Following with Pulmonary  Vaccinations discussed & encouraged     5. Gastroesophageal reflux disease without esophagitis    - Magnesium; Future        Discussed use, benefit, and side effects of prescribed medications. All patient questions answered. Pt voiced understanding. Reviewed health maintenance. Instructed to continue current medications, diet and exercise. Patient agreedwith treatment plan. Follow up as directed.      Electronically signed by TAIWO Perez CNP on 11/20/2023

## 2023-11-21 ENCOUNTER — TELEPHONE (OUTPATIENT)
Dept: PULMONOLOGY | Age: 68
End: 2023-11-21

## 2023-11-21 NOTE — TELEPHONE ENCOUNTER
Writer received faxed notice that patient's [facility] enrollment will  on 23. Patient is scheduled for next injection on 12/15/23. Writer spoke with patient and informed her of the fax that was received. Patient stated that she will come in prior to her upcoming appointment to fill out her portions of the application. Per patient she does not want to change to an auto-injector at home because of fear of needles. Application form is filled out at writers desk awaiting patient to come in to fill out her portion.

## 2023-11-28 NOTE — TELEPHONE ENCOUNTER
Patient came into the office to sign papers for nucala. She was asking about a PA for Symbicort. I went up and spoke with the pt and what she needs is a new script for generic which will be free to her. She needs this because she is in the donut hole. Please sign the attached script.

## 2023-11-30 RX ORDER — BUDESONIDE AND FORMOTEROL FUMARATE DIHYDRATE 160; 4.5 UG/1; UG/1
2 AEROSOL RESPIRATORY (INHALATION) 2 TIMES DAILY
Qty: 10.2 G | Refills: 3 | Status: SHIPPED | OUTPATIENT
Start: 2023-11-30

## 2023-12-05 ENCOUNTER — HOSPITAL ENCOUNTER (OUTPATIENT)
Age: 68
Discharge: HOME | End: 2023-12-05
Payer: MEDICARE

## 2023-12-05 DIAGNOSIS — E11.9 DIET-CONTROLLED DIABETES MELLITUS (HCC): ICD-10-CM

## 2023-12-05 DIAGNOSIS — K21.9 GASTROESOPHAGEAL REFLUX DISEASE WITHOUT ESOPHAGITIS: ICD-10-CM

## 2023-12-05 DIAGNOSIS — E78.2 MIXED HYPERLIPIDEMIA: ICD-10-CM

## 2023-12-05 DIAGNOSIS — I10 ESSENTIAL HYPERTENSION: ICD-10-CM

## 2023-12-05 LAB
ALBUMIN SERPL-MCNC: 3.9 G/DL (ref 3.5–5.2)
ALBUMIN/GLOB SERPL: 1 {RATIO} (ref 1–2.5)
ALP SERPL-CCNC: 95 U/L (ref 35–104)
ALT SERPL-CCNC: 26 U/L (ref 10–35)
ANION GAP SERPL CALCULATED.3IONS-SCNC: 9 MMOL/L (ref 9–16)
AST SERPL-CCNC: 36 U/L (ref 10–35)
BASOPHILS # BLD: 0.03 K/UL (ref 0–0.2)
BASOPHILS NFR BLD: 0 % (ref 0–2)
BILIRUB SERPL-MCNC: 0.4 MG/DL (ref 0–1.2)
BUN SERPL-MCNC: 17 MG/DL (ref 8–23)
CALCIUM SERPL-MCNC: 8.9 MG/DL (ref 8.6–10.4)
CHLORIDE SERPL-SCNC: 107 MMOL/L (ref 98–107)
CHOLEST SERPL-MCNC: 144 MG/DL (ref 0–199)
CHOLESTEROL/HDL RATIO: 3
CO2 SERPL-SCNC: 28 MMOL/L (ref 20–31)
CREAT SERPL-MCNC: 0.8 MG/DL (ref 0.5–0.9)
EOSINOPHIL # BLD: <0.03 K/UL (ref 0–0.44)
EOSINOPHILS RELATIVE PERCENT: 0 % (ref 1–4)
ERYTHROCYTE [DISTWIDTH] IN BLOOD BY AUTOMATED COUNT: 13.2 % (ref 11.8–14.4)
EST. AVERAGE GLUCOSE BLD GHB EST-MCNC: 123 MG/DL
FOLATE SERPL-MCNC: >20 NG/ML (ref 4.8–24.2)
GFR SERPL CREATININE-BSD FRML MDRD: >60 ML/MIN/1.73M2
GLUCOSE SERPL-MCNC: 102 MG/DL (ref 74–99)
HBA1C MFR BLD: 5.9 % (ref 4–6)
HCT VFR BLD AUTO: 40.2 % (ref 36.3–47.1)
HDLC SERPL-MCNC: 45 MG/DL
HGB BLD-MCNC: 12.1 G/DL (ref 11.9–15.1)
IMM GRANULOCYTES # BLD AUTO: <0.03 K/UL (ref 0–0.3)
IMM GRANULOCYTES NFR BLD: 0 %
LDLC SERPL CALC-MCNC: 90 MG/DL (ref 0–100)
LYMPHOCYTES NFR BLD: 1.68 K/UL (ref 1.1–3.7)
LYMPHOCYTES RELATIVE PERCENT: 25 % (ref 24–43)
MAGNESIUM SERPL-MCNC: 2 MG/DL (ref 1.6–2.4)
MCH RBC QN AUTO: 25.8 PG (ref 25.2–33.5)
MCHC RBC AUTO-ENTMCNC: 30.1 G/DL (ref 28.4–34.8)
MCV RBC AUTO: 85.7 FL (ref 82.6–102.9)
MONOCYTES NFR BLD: 0.37 K/UL (ref 0.1–1.2)
MONOCYTES NFR BLD: 6 % (ref 3–12)
NEUTROPHILS NFR BLD: 69 % (ref 36–65)
NEUTS SEG NFR BLD: 4.57 K/UL (ref 1.5–8.1)
NRBC BLD-RTO: 0 PER 100 WBC
PLATELET # BLD AUTO: 292 K/UL (ref 138–453)
PMV BLD AUTO: 10.6 FL (ref 8.1–13.5)
POTASSIUM SERPL-SCNC: 4.2 MMOL/L (ref 3.7–5.3)
PROT SERPL-MCNC: 7 G/DL (ref 6.6–8.7)
RBC # BLD AUTO: 4.69 M/UL (ref 3.95–5.11)
SODIUM SERPL-SCNC: 144 MMOL/L (ref 136–145)
TRIGL SERPL-MCNC: 44 MG/DL (ref 0–149)
TSH SERPL DL<=0.05 MIU/L-ACNC: 1.04 UIU/ML (ref 0.27–4.2)
VIT B12 SERPL-MCNC: >2000 PG/ML (ref 232–1245)
VLDLC SERPL CALC-MCNC: 9 MG/DL
WBC OTHER # BLD: 6.7 K/UL (ref 3.5–11.3)

## 2023-12-05 PROCEDURE — 36415 COLL VENOUS BLD VENIPUNCTURE: CPT

## 2023-12-05 PROCEDURE — 80053 COMPREHEN METABOLIC PANEL: CPT

## 2023-12-05 PROCEDURE — 83036 HEMOGLOBIN GLYCOSYLATED A1C: CPT

## 2023-12-05 PROCEDURE — 82607 VITAMIN B-12: CPT

## 2023-12-05 PROCEDURE — 82746 ASSAY OF FOLIC ACID SERUM: CPT

## 2023-12-05 PROCEDURE — 80061 LIPID PANEL: CPT

## 2023-12-05 PROCEDURE — 85025 COMPLETE CBC W/AUTO DIFF WBC: CPT

## 2023-12-05 PROCEDURE — 84443 ASSAY THYROID STIM HORMONE: CPT

## 2023-12-05 PROCEDURE — 83735 ASSAY OF MAGNESIUM: CPT

## 2023-12-22 ENCOUNTER — TELEPHONE (OUTPATIENT)
Dept: PULMONOLOGY | Age: 68
End: 2023-12-22

## 2023-12-22 NOTE — TELEPHONE ENCOUNTER
Patient called to reschedule injection appt however we never received her Nucala. Patient is going to call the assistance program and check the status of shipping.     Writer will track shipment and call for the appt.

## 2023-12-27 NOTE — TELEPHONE ENCOUNTER
The enrollment form should serve as the prescription, it was faxed on 11/28 and confirmed received as of 11/30 stating they would reach out to us regarding next steps. Not sure if a paper script is needed or not, will await the \"faxed script request\"

## 2023-12-27 NOTE — TELEPHONE ENCOUNTER
Patient called back and stated they are requesting a new RX for nucala and will fax to our office. . I have not seen one come across fax as of yet.

## 2024-01-04 NOTE — TELEPHONE ENCOUNTER
Called the assistance program, they received the application and a call from Ileana in this office today. They will start the process tomorrow, hoping for shipment next week. Writer will continue to track. Patient was updated.

## 2024-01-15 ENCOUNTER — OFFICE VISIT (OUTPATIENT)
Dept: PULMONOLOGY | Age: 69
End: 2024-01-15
Payer: MEDICARE

## 2024-01-15 VITALS
HEIGHT: 59 IN | HEART RATE: 85 BPM | SYSTOLIC BLOOD PRESSURE: 121 MMHG | BODY MASS INDEX: 24.44 KG/M2 | OXYGEN SATURATION: 100 % | RESPIRATION RATE: 12 BRPM | TEMPERATURE: 97.1 F | WEIGHT: 121.2 LBS | DIASTOLIC BLOOD PRESSURE: 70 MMHG

## 2024-01-15 DIAGNOSIS — J82.83 EOSINOPHILIC ASTHMA: Primary | ICD-10-CM

## 2024-01-15 DIAGNOSIS — Z91.09 MULTIPLE ENVIRONMENTAL ALLERGIES: ICD-10-CM

## 2024-01-15 DIAGNOSIS — J45.50 SEVERE PERSISTENT ASTHMA, WELL-CONTROLLED: ICD-10-CM

## 2024-01-15 DIAGNOSIS — K21.9 GASTROESOPHAGEAL REFLUX DISEASE, UNSPECIFIED WHETHER ESOPHAGITIS PRESENT: ICD-10-CM

## 2024-01-15 PROCEDURE — 96372 THER/PROPH/DIAG INJ SC/IM: CPT | Performed by: INTERNAL MEDICINE

## 2024-01-15 RX ORDER — BUDESONIDE AND FORMOTEROL FUMARATE DIHYDRATE 160; 4.5 UG/1; UG/1
2 AEROSOL RESPIRATORY (INHALATION) 2 TIMES DAILY
Qty: 3 EACH | Refills: 3 | Status: SHIPPED | OUTPATIENT
Start: 2024-01-15

## 2024-01-15 RX ORDER — MONTELUKAST SODIUM 10 MG/1
10 TABLET ORAL NIGHTLY
Qty: 90 TABLET | Refills: 3 | Status: SHIPPED | OUTPATIENT
Start: 2024-01-15 | End: 2025-01-14

## 2024-01-15 ASSESSMENT — ENCOUNTER SYMPTOMS
EYES NEGATIVE: 1
RESPIRATORY NEGATIVE: 1
GASTROINTESTINAL NEGATIVE: 1

## 2024-01-15 NOTE — PATIENT INSTRUCTIONS
Nucala patient assistance form filled out and placed on Dr Moore's desk for his signature -BRENNAN

## 2024-01-15 NOTE — PROGRESS NOTES
Subjective:      Patient ID: Saima Ramos is a 68 y.o. female being seen in my clinic for   Chief Complaint   Patient presents with    Asthma       HPI  Follow-up visit for eosinophilic asthma.  Since her last visit 6 months ago she has been nearly asymptomatic.  Never uses albuterol.  In fact, she stopped Spiriva 1.25 mcg because of cost.  Remains on Symbicort 166-4.5 and Singulair 10 mg nightly.  Receives Nucala 100 mg monthly.  Declines auto inject (\"I cannot stand needles\").  Will likely move to [place] this summer to be closer to her daughter.  Denies need for steroids or hospitalizations.  Patient received RSV and flu but not COVID.  Strongly encouraged the latter.    Review of Systems   Constitutional: Negative.    HENT: Negative.     Eyes: Negative.    Respiratory: Negative.     Cardiovascular: Negative.    Gastrointestinal: Negative.    All other systems reviewed and are negative.      Objective:     Vitals:    01/15/24 1409   BP: 121/70   Site: Left Upper Arm   Position: Sitting   Pulse: 85   Resp: 12   Temp: 97.1 °F (36.2 °C)   SpO2: 100%  Comment: room air at rest   Weight: 55 kg (121 lb 3.2 oz)   Height: 1.499 m (4' 11.02\")     Current Outpatient Medications   Medication Sig Dispense Refill    clotrimazole-betamethasone (LOTRISONE) 1-0.05 % cream APPLY TO AFFECTED AREA(S) TWO TIMES A DAY for up to 2 weeks 45 g 1    losartan (COZAAR) 50 MG tablet TAKE 1 TABLET BY MOUTH DAILY 90 tablet 1    omeprazole (PRILOSEC) 20 MG delayed release capsule TAKE 1 CAPSULE BY MOUTH DAILY 90 capsule 3    alendronate (FOSAMAX) 70 MG tablet TAKE 1 TABLET BY MOUTH WEEKLY  WITH 8 OZ OF PLAIN WATER 30  MINUTES BEFORE FIRST FOOD, DRINK OR MEDS. STAY UPRIGHT FOR 30  MINS 12 tablet 3    atorvastatin (LIPITOR) 20 MG tablet Take 1 tablet by mouth daily 90 tablet 3    albuterol sulfate HFA (PROVENTIL HFA) 108 (90 Base) MCG/ACT inhaler Inhale 2 puffs into the lungs every 6 hours as needed for Wheezing or Shortness of Breath 3

## 2024-01-15 NOTE — PROGRESS NOTES
After obtaining consent, and per orders of Dr. Moore, injection of Nucala given in Left deltoid by Kady Abad MA. Patient instructed to remain in clinic for 20 minutes afterwards, and to report any adverse reaction to me immediately.

## 2024-02-05 DIAGNOSIS — E78.2 MIXED HYPERLIPIDEMIA: ICD-10-CM

## 2024-02-05 DIAGNOSIS — I10 ESSENTIAL HYPERTENSION: ICD-10-CM

## 2024-02-05 RX ORDER — ATORVASTATIN CALCIUM 20 MG/1
20 TABLET, FILM COATED ORAL DAILY
Qty: 90 TABLET | Refills: 1 | Status: SHIPPED | OUTPATIENT
Start: 2024-02-05

## 2024-02-05 RX ORDER — LOSARTAN POTASSIUM 50 MG/1
50 TABLET ORAL DAILY
Qty: 90 TABLET | Refills: 1 | Status: SHIPPED | OUTPATIENT
Start: 2024-02-05

## 2024-02-05 NOTE — TELEPHONE ENCOUNTER
Saima Ramos is calling to request a refill on the following medication(s):    Medication Request:  Requested Prescriptions     Pending Prescriptions Disp Refills    losartan (COZAAR) 50 MG tablet [Pharmacy Med Name: Losartan Potassium 50 MG Oral Tablet] 100 tablet 2     Sig: TAKE 1 TABLET BY MOUTH DAILY    atorvastatin (LIPITOR) 20 MG tablet [Pharmacy Med Name: Atorvastatin Calcium 20 MG Oral Tablet] 100 tablet 2     Sig: TAKE 1 TABLET BY MOUTH ONCE  DAILY       Last Visit Date (If Applicable):  11/20/2023    Next Visit Date:    5/20/2024

## 2024-02-14 ENCOUNTER — OFFICE VISIT (OUTPATIENT)
Dept: PULMONOLOGY | Age: 69
End: 2024-02-14
Payer: MEDICARE

## 2024-02-14 VITALS
SYSTOLIC BLOOD PRESSURE: 137 MMHG | RESPIRATION RATE: 18 BRPM | HEART RATE: 86 BPM | DIASTOLIC BLOOD PRESSURE: 78 MMHG | HEIGHT: 59 IN | OXYGEN SATURATION: 100 % | WEIGHT: 122 LBS | BODY MASS INDEX: 24.6 KG/M2

## 2024-02-14 DIAGNOSIS — J45.50 SEVERE PERSISTENT ASTHMA, WELL-CONTROLLED: Primary | ICD-10-CM

## 2024-02-14 PROCEDURE — 96372 THER/PROPH/DIAG INJ SC/IM: CPT | Performed by: INTERNAL MEDICINE

## 2024-02-14 NOTE — PROGRESS NOTES
After obtaining consent, and per orders of Dr. Alexandra, injection of Nucala given in Left deltoid by Kady Abad MA. Patient instructed to remain in clinic for 20 minutes afterwards, and to report any adverse reaction to me immediately. No complications.

## 2024-03-13 ENCOUNTER — OFFICE VISIT (OUTPATIENT)
Dept: PULMONOLOGY | Age: 69
End: 2024-03-13
Payer: MEDICARE

## 2024-03-13 VITALS
BODY MASS INDEX: 24.88 KG/M2 | RESPIRATION RATE: 18 BRPM | TEMPERATURE: 97.2 F | DIASTOLIC BLOOD PRESSURE: 68 MMHG | WEIGHT: 123.4 LBS | HEART RATE: 78 BPM | SYSTOLIC BLOOD PRESSURE: 111 MMHG | OXYGEN SATURATION: 98 % | HEIGHT: 59 IN

## 2024-03-13 DIAGNOSIS — J45.50 SEVERE PERSISTENT ASTHMA, WELL-CONTROLLED: Primary | ICD-10-CM

## 2024-03-13 PROCEDURE — 96372 THER/PROPH/DIAG INJ SC/IM: CPT | Performed by: INTERNAL MEDICINE

## 2024-03-13 NOTE — PROGRESS NOTES
After obtaining consent, and per orders of Dr. Alexandra, injection of Nucala given in Left arm by Kady Abad MA. Patient instructed to remain in clinic for 20 minutes afterwards, and to report any adverse reaction to me immediately. No complications

## 2024-04-04 DIAGNOSIS — M81.0 AGE-RELATED OSTEOPOROSIS WITHOUT CURRENT PATHOLOGICAL FRACTURE: ICD-10-CM

## 2024-04-05 RX ORDER — ALENDRONATE SODIUM 70 MG/1
TABLET ORAL
Qty: 12 TABLET | Refills: 3 | Status: SHIPPED | OUTPATIENT
Start: 2024-04-05

## 2024-04-05 NOTE — TELEPHONE ENCOUNTER
Saima Ramos is calling to request a refill on the following medication(s):    Medication Request:  Requested Prescriptions     Pending Prescriptions Disp Refills    alendronate (FOSAMAX) 70 MG tablet [Pharmacy Med Name: Alendronate Sodium 70 MG Oral Tablet] 12 tablet 3     Sig: TAKE 1 TABLET BY MOUTH WEEKLY  WITH 8 OZ OF PLAIN WATER 30  MINUTES BEFORE FIRST FOOD, DRINK OR MEDS. STAY UPRIGHT FOR 30  MINS       Last Visit Date (If Applicable):  11/20/2023    Next Visit Date:    5/20/2024

## 2024-04-09 ENCOUNTER — OFFICE VISIT (OUTPATIENT)
Dept: PULMONOLOGY | Age: 69
End: 2024-04-09
Payer: MEDICARE

## 2024-04-09 VITALS
WEIGHT: 123 LBS | DIASTOLIC BLOOD PRESSURE: 78 MMHG | OXYGEN SATURATION: 97 % | HEIGHT: 59 IN | RESPIRATION RATE: 16 BRPM | BODY MASS INDEX: 24.8 KG/M2 | SYSTOLIC BLOOD PRESSURE: 139 MMHG

## 2024-04-09 DIAGNOSIS — J45.50 SEVERE PERSISTENT ASTHMA, WELL-CONTROLLED: Primary | ICD-10-CM

## 2024-04-09 PROCEDURE — 96372 THER/PROPH/DIAG INJ SC/IM: CPT | Performed by: INTERNAL MEDICINE

## 2024-05-05 DIAGNOSIS — E78.2 MIXED HYPERLIPIDEMIA: ICD-10-CM

## 2024-05-05 DIAGNOSIS — K21.9 GASTROESOPHAGEAL REFLUX DISEASE WITHOUT ESOPHAGITIS: ICD-10-CM

## 2024-05-05 DIAGNOSIS — I10 ESSENTIAL HYPERTENSION: ICD-10-CM

## 2024-05-06 RX ORDER — LOSARTAN POTASSIUM 50 MG/1
50 TABLET ORAL DAILY
Qty: 90 TABLET | Refills: 1 | Status: SHIPPED | OUTPATIENT
Start: 2024-05-06

## 2024-05-06 RX ORDER — OMEPRAZOLE 20 MG/1
20 CAPSULE, DELAYED RELEASE ORAL DAILY
Qty: 90 CAPSULE | Refills: 1 | Status: SHIPPED | OUTPATIENT
Start: 2024-05-06

## 2024-05-06 RX ORDER — ATORVASTATIN CALCIUM 20 MG/1
20 TABLET, FILM COATED ORAL DAILY
Qty: 90 TABLET | Refills: 1 | Status: SHIPPED | OUTPATIENT
Start: 2024-05-06

## 2024-05-06 NOTE — TELEPHONE ENCOUNTER
Saima Ramos is calling to request a refill on the following medication(s):    Medication Request:  Requested Prescriptions     Pending Prescriptions Disp Refills    atorvastatin (LIPITOR) 20 MG tablet [Pharmacy Med Name: Atorvastatin Calcium 20 MG Oral Tablet] 100 tablet 2     Sig: TAKE 1 TABLET BY MOUTH ONCE  DAILY    omeprazole (PRILOSEC) 20 MG delayed release capsule [Pharmacy Med Name: Omeprazole 20 MG Oral Capsule Delayed Release] 100 capsule 2     Sig: TAKE 1 CAPSULE BY MOUTH DAILY    losartan (COZAAR) 50 MG tablet [Pharmacy Med Name: Losartan Potassium 50 MG Oral Tablet] 100 tablet 2     Sig: TAKE 1 TABLET BY MOUTH DAILY       Last Visit Date (If Applicable):  11/20/2023    Next Visit Date:    5/20/2024

## 2024-05-07 ENCOUNTER — OFFICE VISIT (OUTPATIENT)
Dept: PULMONOLOGY | Age: 69
End: 2024-05-07
Payer: MEDICARE

## 2024-05-07 VITALS
HEART RATE: 83 BPM | HEIGHT: 59 IN | SYSTOLIC BLOOD PRESSURE: 123 MMHG | BODY MASS INDEX: 25.4 KG/M2 | RESPIRATION RATE: 15 BRPM | DIASTOLIC BLOOD PRESSURE: 73 MMHG | WEIGHT: 126 LBS | OXYGEN SATURATION: 99 %

## 2024-05-07 DIAGNOSIS — J45.50 SEVERE PERSISTENT ASTHMA, WELL-CONTROLLED: Primary | ICD-10-CM

## 2024-05-07 PROCEDURE — 96372 THER/PROPH/DIAG INJ SC/IM: CPT | Performed by: INTERNAL MEDICINE

## 2024-05-07 NOTE — PROGRESS NOTES
After obtaining consent, and per orders of Dr. Alexandra, injection of Nucala given in Left arm by Neeru Egan MA. Patient instructed to remain in clinic for 20 minutes afterwards, and to report any adverse reaction to me immediately.     Patient supplied

## 2024-05-20 ENCOUNTER — OFFICE VISIT (OUTPATIENT)
Dept: FAMILY MEDICINE CLINIC | Age: 69
End: 2024-05-20
Payer: MEDICARE

## 2024-05-20 VITALS
HEART RATE: 79 BPM | OXYGEN SATURATION: 97 % | DIASTOLIC BLOOD PRESSURE: 80 MMHG | HEIGHT: 59 IN | BODY MASS INDEX: 25.56 KG/M2 | TEMPERATURE: 97.4 F | SYSTOLIC BLOOD PRESSURE: 128 MMHG | WEIGHT: 126.8 LBS

## 2024-05-20 DIAGNOSIS — R41.3 MEMORY CHANGES: ICD-10-CM

## 2024-05-20 DIAGNOSIS — Z12.31 ENCOUNTER FOR SCREENING MAMMOGRAM FOR MALIGNANT NEOPLASM OF BREAST: ICD-10-CM

## 2024-05-20 DIAGNOSIS — E78.2 MIXED HYPERLIPIDEMIA: ICD-10-CM

## 2024-05-20 DIAGNOSIS — I10 ESSENTIAL HYPERTENSION: ICD-10-CM

## 2024-05-20 DIAGNOSIS — Z00.00 MEDICARE ANNUAL WELLNESS VISIT, SUBSEQUENT: Primary | ICD-10-CM

## 2024-05-20 DIAGNOSIS — N94.10 DYSPAREUNIA IN FEMALE: ICD-10-CM

## 2024-05-20 DIAGNOSIS — R73.03 PRE-DIABETES: ICD-10-CM

## 2024-05-20 PROBLEM — E11.9 DIET-CONTROLLED DIABETES MELLITUS (HCC): Status: RESOLVED | Noted: 2022-12-22 | Resolved: 2024-05-20

## 2024-05-20 PROCEDURE — G8427 DOCREV CUR MEDS BY ELIG CLIN: HCPCS | Performed by: NURSE PRACTITIONER

## 2024-05-20 PROCEDURE — 3074F SYST BP LT 130 MM HG: CPT | Performed by: NURSE PRACTITIONER

## 2024-05-20 PROCEDURE — G8419 CALC BMI OUT NRM PARAM NOF/U: HCPCS | Performed by: NURSE PRACTITIONER

## 2024-05-20 PROCEDURE — 3079F DIAST BP 80-89 MM HG: CPT | Performed by: NURSE PRACTITIONER

## 2024-05-20 PROCEDURE — G0439 PPPS, SUBSEQ VISIT: HCPCS | Performed by: NURSE PRACTITIONER

## 2024-05-20 PROCEDURE — 3017F COLORECTAL CA SCREEN DOC REV: CPT | Performed by: NURSE PRACTITIONER

## 2024-05-20 PROCEDURE — 1123F ACP DISCUSS/DSCN MKR DOCD: CPT | Performed by: NURSE PRACTITIONER

## 2024-05-20 PROCEDURE — 1090F PRES/ABSN URINE INCON ASSESS: CPT | Performed by: NURSE PRACTITIONER

## 2024-05-20 PROCEDURE — G8399 PT W/DXA RESULTS DOCUMENT: HCPCS | Performed by: NURSE PRACTITIONER

## 2024-05-20 PROCEDURE — 99214 OFFICE O/P EST MOD 30 MIN: CPT | Performed by: NURSE PRACTITIONER

## 2024-05-20 PROCEDURE — 1036F TOBACCO NON-USER: CPT | Performed by: NURSE PRACTITIONER

## 2024-05-20 ASSESSMENT — PATIENT HEALTH QUESTIONNAIRE - PHQ9
2. FEELING DOWN, DEPRESSED OR HOPELESS: NOT AT ALL
SUM OF ALL RESPONSES TO PHQ9 QUESTIONS 1 & 2: 0
SUM OF ALL RESPONSES TO PHQ QUESTIONS 1-9: 0
SUM OF ALL RESPONSES TO PHQ QUESTIONS 1-9: 0
1. LITTLE INTEREST OR PLEASURE IN DOING THINGS: NOT AT ALL
SUM OF ALL RESPONSES TO PHQ QUESTIONS 1-9: 0
SUM OF ALL RESPONSES TO PHQ QUESTIONS 1-9: 0

## 2024-05-20 ASSESSMENT — ENCOUNTER SYMPTOMS
VOMITING: 0
SHORTNESS OF BREATH: 0
EYES NEGATIVE: 1
COLOR CHANGE: 0
CHEST TIGHTNESS: 0
NAUSEA: 0
RESPIRATORY NEGATIVE: 1
ALLERGIC/IMMUNOLOGIC NEGATIVE: 1
GASTROINTESTINAL NEGATIVE: 1
COUGH: 0
DIARRHEA: 0

## 2024-05-20 NOTE — PROGRESS NOTES
[facility]  [address] [place] [address]  Dept: [phone]    Saima Ramos is a 69 y.o. female who presents today for her medical conditions/complaintsas noted below.      Saima Ramos is here today c/o Medicare AWV, Hypertension, Hyperlipidemia, and Memory Loss    Past Medical History:   Diagnosis Date    Asthma 10/03/2013    Essential hypertension 01/17/2021    GERD (gastroesophageal reflux disease)     Hyperlipidemia     Pre-diabetes       Past Surgical History:   Procedure Laterality Date    COLONOSCOPY  2007; due 2017    HYSTERECTOMY, TOTAL ABDOMINAL (CERVIX REMOVED)      DUB    THYROIDECTOMY, PARTIAL      d/t goiter       Family History   Problem Relation Age of Onset    Diabetes Mother     Prostate Cancer Father        Social History     Tobacco Use    Smoking status: Never    Smokeless tobacco: Never   Substance Use Topics    Alcohol use: No      Current Outpatient Medications   Medication Sig Dispense Refill    atorvastatin (LIPITOR) 20 MG tablet TAKE 1 TABLET BY MOUTH ONCE  DAILY 90 tablet 1    omeprazole (PRILOSEC) 20 MG delayed release capsule TAKE 1 CAPSULE BY MOUTH DAILY 90 capsule 1    losartan (COZAAR) 50 MG tablet TAKE 1 TABLET BY MOUTH DAILY 90 tablet 1    alendronate (FOSAMAX) 70 MG tablet TAKE 1 TABLET BY MOUTH WEEKLY  WITH 8 OZ OF PLAIN WATER 30  MINUTES BEFORE FIRST FOOD, DRINK OR MEDS. STAY UPRIGHT FOR 30  MINS 12 tablet 3    budesonide-formoterol (SYMBICORT) 160-4.5 MCG/ACT AERO Inhale 2 puffs into the lungs 2 times daily 3 each 3    montelukast (SINGULAIR) 10 MG tablet Take 1 tablet by mouth nightly 90 tablet 3    clotrimazole-betamethasone (LOTRISONE) 1-0.05 % cream APPLY TO AFFECTED AREA(S) TWO TIMES A DAY for up to 2 weeks 45 g 1    EPINEPHrine (EPIPEN 2-MEKHI) 0.3 MG/0.3ML SOAJ injection Inject 0.3 mLs into the muscle once for 1 dose Use as directed for allergic reaction 0.3 mL 0    albuterol sulfate HFA (PROVENTIL HFA) 108 (90 Base) MCG/ACT

## 2024-05-20 NOTE — PROGRESS NOTES
Medicare Annual Wellness Visit    Saima Ramos is here for Medicare AWV, Hypertension, and Hyperlipidemia    Assessment & Plan   Medicare annual wellness visit, subsequent  Questionnaire reviewed & discussed   Preventative care reviewed  Will obtain colo-guard results from [facility]   Immunizations discussed & encouraged   Pre-diabetes  -     CBC with Auto Differential; Future  -     Comprehensive Metabolic Panel; Future  -     Hemoglobin A1C; Future  -     Microalbumin, Ur; Future  Memory changes  -     CBC with Auto Differential; Future  -     Comprehensive Metabolic Panel; Future  -     TSH with Reflex; Future  -     Hemoglobin A1C; Future  -     Magnesium; Future  -     Vitamin B12 & Folate; Future  -     Urinalysis; Future  -     Culture, Urine; Future  Essential hypertension  -     CBC with Auto Differential; Future  -     Comprehensive Metabolic Panel; Future  -     TSH with Reflex; Future  Mixed hyperlipidemia  -     Lipid, Fasting; Future  Dyspareunia in female  Encounter for screening mammogram for malignant neoplasm of breast  -     LINDA TREY DIGITAL SCREEN BILATERAL; Future  Recommendations for Preventive Services Due: see orders and patient instructions/AVS.  Recommended screening schedule for the next 5-10 years is provided to the patient in written form: see Patient Instructions/AVS.     Return in about 6 months (around 11/20/2024), or if symptoms worsen or fail to improve, for HTN, Med check.     Subjective   The following acute and/or chronic problems were also addressed today:  HTN, HLD, memory changes, dyspareunia     Patient's complete Health Risk Assessment and screening values have been reviewed and are found in Flowsheets. The following problems were reviewed today and where indicated follow up appointments were made and/or referrals ordered.    Positive Risk Factor Screenings with Interventions:                Activity, Diet, and Weight:  On average, how many days per week do you

## 2024-06-05 ENCOUNTER — OFFICE VISIT (OUTPATIENT)
Dept: PULMONOLOGY | Age: 69
End: 2024-06-05

## 2024-06-05 VITALS
HEIGHT: 59 IN | OXYGEN SATURATION: 97 % | WEIGHT: 124 LBS | SYSTOLIC BLOOD PRESSURE: 137 MMHG | HEART RATE: 79 BPM | BODY MASS INDEX: 25 KG/M2 | RESPIRATION RATE: 16 BRPM | DIASTOLIC BLOOD PRESSURE: 77 MMHG

## 2024-06-05 DIAGNOSIS — J45.50 SEVERE PERSISTENT ASTHMA, WELL-CONTROLLED: Primary | ICD-10-CM

## 2024-06-05 NOTE — PROGRESS NOTES
After obtaining consent, and per orders of Dr. Alexandra, injection of Nucala given in Left arm (dorsal) by Janna Lai MA. Patient instructed to remain in clinic for 20 minutes afterwards, and to report any adverse reaction to me immediately.

## 2024-06-07 ENCOUNTER — TELEPHONE (OUTPATIENT)
Dept: PULMONOLOGY | Age: 69
End: 2024-06-07

## 2024-06-07 DIAGNOSIS — J45.50 SEVERE PERSISTENT ASTHMA, WELL-CONTROLLED: Primary | ICD-10-CM

## 2024-06-07 RX ORDER — BUDESONIDE AND FORMOTEROL FUMARATE DIHYDRATE 160; 4.5 UG/1; UG/1
2 AEROSOL RESPIRATORY (INHALATION) 2 TIMES DAILY
Qty: 3 EACH | Refills: 3 | Status: SHIPPED | OUTPATIENT
Start: 2024-06-07 | End: 2025-06-06

## 2024-06-07 NOTE — TELEPHONE ENCOUNTER
Patient is requesting a 90 day script of Symbicort sent her [facility] pharmacy. Writer pended a script, please sign off if you agree.

## 2024-06-12 ENCOUNTER — OFFICE VISIT (OUTPATIENT)
Dept: FAMILY MEDICINE CLINIC | Age: 69
End: 2024-06-12

## 2024-06-12 ENCOUNTER — HOSPITAL ENCOUNTER (OUTPATIENT)
Age: 69
Discharge: HOME | End: 2024-06-14
Payer: MEDICARE

## 2024-06-12 ENCOUNTER — HOSPITAL ENCOUNTER (OUTPATIENT)
Dept: GENERAL RADIOLOGY | Age: 69
Discharge: HOME | End: 2024-06-14
Payer: MEDICARE

## 2024-06-12 VITALS
DIASTOLIC BLOOD PRESSURE: 80 MMHG | TEMPERATURE: 97.4 F | OXYGEN SATURATION: 99 % | WEIGHT: 123.6 LBS | BODY MASS INDEX: 24.96 KG/M2 | SYSTOLIC BLOOD PRESSURE: 130 MMHG | HEART RATE: 59 BPM

## 2024-06-12 DIAGNOSIS — M54.50 LUMBAR PAIN: ICD-10-CM

## 2024-06-12 DIAGNOSIS — M54.50 LUMBAR PAIN: Primary | ICD-10-CM

## 2024-06-12 PROCEDURE — 72100 X-RAY EXAM L-S SPINE 2/3 VWS: CPT

## 2024-06-12 RX ORDER — TIZANIDINE 2 MG/1
2 TABLET ORAL NIGHTLY PRN
Qty: 10 TABLET | Refills: 0 | Status: SHIPPED | OUTPATIENT
Start: 2024-06-12

## 2024-06-12 ASSESSMENT — PATIENT HEALTH QUESTIONNAIRE - PHQ9
1. LITTLE INTEREST OR PLEASURE IN DOING THINGS: NOT AT ALL
SUM OF ALL RESPONSES TO PHQ QUESTIONS 1-9: 0
2. FEELING DOWN, DEPRESSED OR HOPELESS: NOT AT ALL
SUM OF ALL RESPONSES TO PHQ QUESTIONS 1-9: 0
SUM OF ALL RESPONSES TO PHQ9 QUESTIONS 1 & 2: 0

## 2024-06-12 ASSESSMENT — ENCOUNTER SYMPTOMS: BACK PAIN: 1

## 2024-06-12 NOTE — PROGRESS NOTES
Lupe [facility], [place] [id number]  [place]  [address]  [phone]  [phone]    Saima Ramos is a 69 y.o. female who is here with c/o of:    Chief Complaint: Back Pain (Pt [place] a [place] on [place]  walking and has had back pain since thinks she strained a muscle )      Patient Accompanied by: n/a    HPI - Saima Ramos is here today with c/o:    Patient here with complaints of back pain that started Sunday. Reports she was at the park and walking and thinks she pulled a muscle.     Described pain as aching and rates it 6/10    Has tried Advil and heat without relief.     Denies any numbness or tingling, saddle anesthesia, or issues with bowel or bladder incontinence.     Health Maintenance Due   Topic Date Due    Diabetic retinal exam  Never done    DTaP/Tdap/Td vaccine (1 - Tdap) Never done    Diabetic Alb to Cr ratio (uACR) test  05/06/2024    Diabetic foot exam  05/15/2024        Patient Active Problem List:     Gastroesophageal reflux disease without esophagitis     Essential hypertension     Vulvar dystrophy     Skin sensitivity     Severe persistent asthma without complication     Mixed hyperlipidemia     Age-related osteoporosis without current pathological fracture     Pre-diabetes     Past Medical History:   Diagnosis Date    Asthma 10/03/2013    Essential hypertension 01/17/2021    GERD (gastroesophageal reflux disease)     Hyperlipidemia     Pre-diabetes       Past Surgical History:   Procedure Laterality Date    COLONOSCOPY  2007; due 2017    HYSTERECTOMY, TOTAL ABDOMINAL (CERVIX REMOVED)      DUB    THYROIDECTOMY, PARTIAL      d/t goiter     Family History   Problem Relation Age of Onset    Diabetes Mother     Prostate Cancer Father      Social History     Tobacco Use    Smoking status: Never    Smokeless tobacco: Never   Substance Use Topics    Alcohol use: No     ALLERGIES:    Allergies   Allergen Reactions    Sulfamethoxazole-Trimethoprim

## 2024-07-01 ENCOUNTER — OFFICE VISIT (OUTPATIENT)
Dept: PULMONOLOGY | Age: 69
End: 2024-07-01
Payer: MEDICARE

## 2024-07-01 VITALS
BODY MASS INDEX: 24.9 KG/M2 | HEIGHT: 59 IN | DIASTOLIC BLOOD PRESSURE: 78 MMHG | OXYGEN SATURATION: 98 % | RESPIRATION RATE: 16 BRPM | WEIGHT: 123.5 LBS | HEART RATE: 87 BPM | SYSTOLIC BLOOD PRESSURE: 128 MMHG

## 2024-07-01 DIAGNOSIS — J45.50 SEVERE PERSISTENT ASTHMA, WELL-CONTROLLED: Primary | ICD-10-CM

## 2024-07-01 PROCEDURE — 96372 THER/PROPH/DIAG INJ SC/IM: CPT | Performed by: INTERNAL MEDICINE

## 2024-07-01 NOTE — PROGRESS NOTES
After obtaining consent, and per orders of Dr. Alexandra, injection of Nucala given in Right arm by Kady Abad MA. Patient instructed to remain in clinic for 20 minutes afterwards, and to report any adverse reaction to me immediately.

## 2024-08-06 ENCOUNTER — OFFICE VISIT (OUTPATIENT)
Dept: PULMONOLOGY | Age: 69
End: 2024-08-06
Payer: MEDICARE

## 2024-08-06 VITALS
RESPIRATION RATE: 18 BRPM | BODY MASS INDEX: 24.64 KG/M2 | HEIGHT: 59 IN | HEART RATE: 68 BPM | SYSTOLIC BLOOD PRESSURE: 132 MMHG | OXYGEN SATURATION: 97 % | WEIGHT: 122.2 LBS | DIASTOLIC BLOOD PRESSURE: 75 MMHG

## 2024-08-06 DIAGNOSIS — J82.83 EOSINOPHILIC ASTHMA: ICD-10-CM

## 2024-08-06 DIAGNOSIS — J45.50 SEVERE PERSISTENT ASTHMA, WELL-CONTROLLED: Primary | ICD-10-CM

## 2024-08-06 PROCEDURE — 96372 THER/PROPH/DIAG INJ SC/IM: CPT | Performed by: INTERNAL MEDICINE

## 2024-08-06 NOTE — PROGRESS NOTES
After obtaining consent, and per orders of Dr. Alexandra, injection of Nucala in left arm given in by Janna Lai MA. Patient instructed to remain in clinic for 20 minutes afterwards, and to report any adverse reaction to me immediately.

## 2024-08-15 DIAGNOSIS — I10 ESSENTIAL HYPERTENSION: ICD-10-CM

## 2024-08-15 DIAGNOSIS — E78.2 MIXED HYPERLIPIDEMIA: ICD-10-CM

## 2024-08-15 DIAGNOSIS — K21.9 GASTROESOPHAGEAL REFLUX DISEASE WITHOUT ESOPHAGITIS: ICD-10-CM

## 2024-08-15 RX ORDER — OMEPRAZOLE 20 MG/1
20 CAPSULE, DELAYED RELEASE ORAL DAILY
Qty: 90 CAPSULE | Refills: 2 | Status: SHIPPED | OUTPATIENT
Start: 2024-08-15

## 2024-08-15 RX ORDER — ATORVASTATIN CALCIUM 20 MG/1
20 TABLET, FILM COATED ORAL DAILY
Qty: 90 TABLET | Refills: 2 | Status: SHIPPED | OUTPATIENT
Start: 2024-08-15

## 2024-08-15 RX ORDER — LOSARTAN POTASSIUM 50 MG/1
50 TABLET ORAL DAILY
Qty: 90 TABLET | Refills: 2 | Status: SHIPPED | OUTPATIENT
Start: 2024-08-15

## 2024-08-15 NOTE — TELEPHONE ENCOUNTER
Saima Ramos is calling to request a refill on the following medication(s):    Medication Request:  Requested Prescriptions     Pending Prescriptions Disp Refills    omeprazole (PRILOSEC) 20 MG delayed release capsule [Pharmacy Med Name: Omeprazole 20 MG Oral Capsule Delayed Release] 90 capsule 1     Sig: TAKE 1 CAPSULE BY MOUTH DAILY    atorvastatin (LIPITOR) 20 MG tablet [Pharmacy Med Name: Atorvastatin Calcium 20 MG Oral Tablet] 90 tablet 1     Sig: TAKE 1 TABLET BY MOUTH ONCE  DAILY    losartan (COZAAR) 50 MG tablet [Pharmacy Med Name: Losartan Potassium 50 MG Oral Tablet] 90 tablet 1     Sig: TAKE 1 TABLET BY MOUTH DAILY       Last Visit Date (If Applicable):  5/20/2024    Next Visit Date:    11/21/2024

## 2024-09-05 ENCOUNTER — OFFICE VISIT (OUTPATIENT)
Dept: PULMONOLOGY | Age: 69
End: 2024-09-05
Payer: MEDICARE

## 2024-09-05 VITALS
HEIGHT: 59 IN | RESPIRATION RATE: 13 BRPM | DIASTOLIC BLOOD PRESSURE: 72 MMHG | WEIGHT: 123 LBS | BODY MASS INDEX: 24.8 KG/M2 | TEMPERATURE: 97.3 F | HEART RATE: 80 BPM | OXYGEN SATURATION: 96 % | SYSTOLIC BLOOD PRESSURE: 133 MMHG

## 2024-09-05 DIAGNOSIS — J45.50 SEVERE PERSISTENT ASTHMA, WELL-CONTROLLED: Primary | ICD-10-CM

## 2024-09-05 PROCEDURE — 96372 THER/PROPH/DIAG INJ SC/IM: CPT | Performed by: INTERNAL MEDICINE

## 2024-09-05 NOTE — PROGRESS NOTES
After obtaining consent, and per orders of Dr. Duran, injection of Nucala given in Left arm by Belle Tineo MA. Patient instructed to remain in clinic for 20 minutes afterwards, and to report any adverse reaction to me immediately.     Sample supply Autoinjector pen  No reactions

## 2024-10-03 ENCOUNTER — OFFICE VISIT (OUTPATIENT)
Dept: PULMONOLOGY | Age: 69
End: 2024-10-03
Payer: MEDICARE

## 2024-10-03 VITALS
HEART RATE: 68 BPM | HEIGHT: 59 IN | DIASTOLIC BLOOD PRESSURE: 77 MMHG | WEIGHT: 126 LBS | RESPIRATION RATE: 18 BRPM | SYSTOLIC BLOOD PRESSURE: 153 MMHG | BODY MASS INDEX: 25.4 KG/M2 | OXYGEN SATURATION: 98 %

## 2024-10-03 DIAGNOSIS — J82.83 EOSINOPHILIC ASTHMA: ICD-10-CM

## 2024-10-03 DIAGNOSIS — J45.50 SEVERE PERSISTENT ASTHMA, WELL-CONTROLLED: Primary | ICD-10-CM

## 2024-10-03 PROCEDURE — 96372 THER/PROPH/DIAG INJ SC/IM: CPT | Performed by: INTERNAL MEDICINE

## 2024-10-03 NOTE — PROGRESS NOTES
After obtaining consent, and per orders of Dr. Falcon, injection of Nucala given in Left arm by Janna Lai MA. Patient instructed to remain in clinic for 20 minutes afterwards, and to report any adverse reaction to me immediately.

## 2024-11-07 ENCOUNTER — OFFICE VISIT (OUTPATIENT)
Dept: PULMONOLOGY | Age: 69
End: 2024-11-07

## 2024-11-07 VITALS
BODY MASS INDEX: 24.72 KG/M2 | WEIGHT: 122.6 LBS | SYSTOLIC BLOOD PRESSURE: 134 MMHG | HEART RATE: 77 BPM | HEIGHT: 59 IN | OXYGEN SATURATION: 98 % | DIASTOLIC BLOOD PRESSURE: 70 MMHG

## 2024-11-07 DIAGNOSIS — J45.51 SEVERE PERSISTENT ASTHMA, POORLY-CONTROLLED, WITH ACUTE EXACERBATION: ICD-10-CM

## 2024-11-07 DIAGNOSIS — J45.50 SEVERE PERSISTENT ASTHMA, WELL-CONTROLLED: ICD-10-CM

## 2024-11-07 DIAGNOSIS — J82.83 EOSINOPHILIC ASTHMA: Primary | ICD-10-CM

## 2024-11-07 NOTE — PROGRESS NOTES
After obtaining consent, and per orders of Dr. Renteria, injection of FLUAD given in Left deltoid and Nucala given in right arm by Janna Lai MA. Patient instructed to remain in clinic for 20 minutes afterwards, and to report any adverse reaction to me immediately.

## 2024-11-11 ENCOUNTER — TELEPHONE (OUTPATIENT)
Dept: PULMONOLOGY | Age: 69
End: 2024-11-11

## 2024-11-11 NOTE — TELEPHONE ENCOUNTER
Writer received fax from [facility] to [facility], patient will need to come in to fill out a new enrollment form for the [facility] assistance program. Form is filled out and writer has. Patient will need to come in to sign her portion and provide her annual pretax household income and number of family members living in the household. Once this is done, writer will have Dr Whitaker sign his portion and the enrollment form will be faxed back.     Writer spoke with patient and informed her of the above. Per patient she will come to the office to fill out her portion and sign.

## 2024-12-05 ENCOUNTER — OFFICE VISIT (OUTPATIENT)
Dept: PULMONOLOGY | Age: 69
End: 2024-12-05

## 2024-12-05 VITALS
DIASTOLIC BLOOD PRESSURE: 71 MMHG | HEART RATE: 91 BPM | RESPIRATION RATE: 18 BRPM | OXYGEN SATURATION: 97 % | TEMPERATURE: 97.2 F | BODY MASS INDEX: 25.2 KG/M2 | HEIGHT: 59 IN | WEIGHT: 125 LBS | SYSTOLIC BLOOD PRESSURE: 139 MMHG

## 2024-12-05 DIAGNOSIS — J82.83 EOSINOPHILIC ASTHMA: Primary | ICD-10-CM

## 2024-12-05 DIAGNOSIS — Z91.09 MULTIPLE ENVIRONMENTAL ALLERGIES: ICD-10-CM

## 2024-12-05 DIAGNOSIS — J45.51 SEVERE PERSISTENT ASTHMA, POORLY-CONTROLLED, WITH ACUTE EXACERBATION: ICD-10-CM

## 2024-12-05 NOTE — PROGRESS NOTES
After obtaining consent, and per orders of Dr. Renteria, injection of Nucala given in Right arm by Janna Lai MA. Patient instructed to remain in clinic for 20 minutes afterwards, and to report any adverse reaction to me immediately.

## 2024-12-09 ENCOUNTER — COMMUNITY OUTREACH (OUTPATIENT)
Facility: CLINIC | Age: 69
End: 2024-12-09

## 2025-01-08 ENCOUNTER — OFFICE VISIT (OUTPATIENT)
Dept: PULMONOLOGY | Age: 70
End: 2025-01-08

## 2025-01-08 VITALS
OXYGEN SATURATION: 98 % | RESPIRATION RATE: 18 BRPM | DIASTOLIC BLOOD PRESSURE: 82 MMHG | WEIGHT: 126 LBS | HEIGHT: 59 IN | SYSTOLIC BLOOD PRESSURE: 155 MMHG | HEART RATE: 85 BPM | BODY MASS INDEX: 25.4 KG/M2

## 2025-01-08 DIAGNOSIS — J82.83 EOSINOPHILIC ASTHMA: Primary | ICD-10-CM

## 2025-01-08 DIAGNOSIS — J45.51 SEVERE PERSISTENT ASTHMA, POORLY-CONTROLLED, WITH ACUTE EXACERBATION: ICD-10-CM

## 2025-01-08 NOTE — PROGRESS NOTES
After obtaining consent, and per orders of Dr. Alexandra, injection of Nucala given in Right arm by Janna Lai MA. Patient instructed to remain in clinic for 20 minutes afterwards, and to report any adverse reaction to me immediately.

## 2025-01-13 ENCOUNTER — OFFICE VISIT (OUTPATIENT)
Dept: FAMILY MEDICINE CLINIC | Age: 70
End: 2025-01-13

## 2025-01-13 VITALS
WEIGHT: 126.8 LBS | TEMPERATURE: 97.7 F | OXYGEN SATURATION: 98 % | BODY MASS INDEX: 25.61 KG/M2 | SYSTOLIC BLOOD PRESSURE: 124 MMHG | DIASTOLIC BLOOD PRESSURE: 70 MMHG | HEART RATE: 89 BPM

## 2025-01-13 DIAGNOSIS — E78.2 MIXED HYPERLIPIDEMIA: ICD-10-CM

## 2025-01-13 DIAGNOSIS — I10 ESSENTIAL HYPERTENSION: Primary | ICD-10-CM

## 2025-01-13 DIAGNOSIS — R23.8 SKIN SENSITIVITY: ICD-10-CM

## 2025-01-13 DIAGNOSIS — J45.50 SEVERE PERSISTENT ASTHMA WITHOUT COMPLICATION: ICD-10-CM

## 2025-01-13 DIAGNOSIS — E11.9 DIET-CONTROLLED DIABETES MELLITUS (HCC): ICD-10-CM

## 2025-01-13 RX ORDER — CLOTRIMAZOLE AND BETAMETHASONE DIPROPIONATE 10; .64 MG/G; MG/G
CREAM TOPICAL
Qty: 45 G | Refills: 1 | Status: SHIPPED | OUTPATIENT
Start: 2025-01-13

## 2025-01-13 RX ORDER — CLOTRIMAZOLE AND BETAMETHASONE DIPROPIONATE 10; .64 MG/G; MG/G
CREAM TOPICAL
Qty: 45 G | Refills: 1 | Status: SHIPPED | OUTPATIENT
Start: 2025-01-13 | End: 2025-01-13

## 2025-01-13 SDOH — ECONOMIC STABILITY: FOOD INSECURITY: WITHIN THE PAST 12 MONTHS, YOU WORRIED THAT YOUR FOOD WOULD RUN OUT BEFORE YOU GOT MONEY TO BUY MORE.: NEVER TRUE

## 2025-01-13 SDOH — ECONOMIC STABILITY: FOOD INSECURITY: WITHIN THE PAST 12 MONTHS, THE FOOD YOU BOUGHT JUST DIDN'T LAST AND YOU DIDN'T HAVE MONEY TO GET MORE.: NEVER TRUE

## 2025-01-13 ASSESSMENT — PATIENT HEALTH QUESTIONNAIRE - PHQ9
1. LITTLE INTEREST OR PLEASURE IN DOING THINGS: NOT AT ALL
SUM OF ALL RESPONSES TO PHQ QUESTIONS 1-9: 0
SUM OF ALL RESPONSES TO PHQ9 QUESTIONS 1 & 2: 0
2. FEELING DOWN, DEPRESSED OR HOPELESS: NOT AT ALL
SUM OF ALL RESPONSES TO PHQ QUESTIONS 1-9: 0

## 2025-01-13 NOTE — PROGRESS NOTES
[facility], [facility]  [address], [address]  [place]  [address]  [phone]  [phone]    Saima Ramos is a 69 y.o. female who is here with c/o of:    Chief Complaint: Hypertension      Patient Accompanied by: n/a    HPI - Saima Ramos is here today with c/o:    History of Present Illness  The patient is a 69-year-old female who presents for reevaluation of chronic conditions.    She monitors her blood pressure at home, which typically ranges from 130 to 140 systolic, with a recent reading of 147. The diastolic readings have been within normal limits.    Her asthma is well-managed, with daily use of Symbicort twice a day and infrequent need for albuterol. She is under the care of a pulmonologist for Nucala administration.    She has been diagnosed with cataracts and is scheduled for surgical intervention.    She reports a decline in memory function.    She is seeking a refill of her Lotrisone cream prescription for underarm application.    She has acid reflux. But it is well controlled    She is borderline diabetic. No medications    MEDICATIONS  Current: Lotrisone cream, Symbicort, albuterol, Nucala       Health Maintenance Due   Topic Date Due    Diabetic retinal exam  Never done    DTaP/Tdap/Td vaccine (1 - Tdap) Never done    Diabetic Alb to Cr ratio (uACR) test  05/06/2024    Diabetic foot exam  05/15/2024    A1C test (Diabetic or Prediabetic)  12/05/2024    Lipids  12/05/2024    GFR test (Diabetes, CKD 3-4, OR last GFR 15-59)  12/05/2024    Annual Wellness Visit (Medicare Advantage)  01/01/2025        Patient Active Problem List:     Gastroesophageal reflux disease without esophagitis     Essential hypertension     Vulvar dystrophy     Skin sensitivity     Severe persistent asthma without complication     Mixed hyperlipidemia     Age-related osteoporosis without current pathological fracture     Pre-diabetes     Past Medical History:   Diagnosis Date    Asthma

## 2025-02-10 ENCOUNTER — OFFICE VISIT (OUTPATIENT)
Dept: PULMONOLOGY | Age: 70
End: 2025-02-10
Payer: MEDICARE

## 2025-02-10 VITALS
OXYGEN SATURATION: 99 % | HEART RATE: 99 BPM | BODY MASS INDEX: 25.2 KG/M2 | WEIGHT: 125 LBS | RESPIRATION RATE: 12 BRPM | DIASTOLIC BLOOD PRESSURE: 77 MMHG | HEIGHT: 59 IN | SYSTOLIC BLOOD PRESSURE: 132 MMHG

## 2025-02-10 DIAGNOSIS — J82.83 EOSINOPHILIC ASTHMA: ICD-10-CM

## 2025-02-10 DIAGNOSIS — J45.50 SEVERE PERSISTENT ASTHMA, WELL-CONTROLLED: Primary | ICD-10-CM

## 2025-02-10 DIAGNOSIS — K21.9 GASTROESOPHAGEAL REFLUX DISEASE WITHOUT ESOPHAGITIS: ICD-10-CM

## 2025-02-10 DIAGNOSIS — Z91.09 ENVIRONMENTAL ALLERGIES: ICD-10-CM

## 2025-02-10 PROCEDURE — 1159F MED LIST DOCD IN RCRD: CPT | Performed by: STUDENT IN AN ORGANIZED HEALTH CARE EDUCATION/TRAINING PROGRAM

## 2025-02-10 PROCEDURE — 3078F DIAST BP <80 MM HG: CPT | Performed by: STUDENT IN AN ORGANIZED HEALTH CARE EDUCATION/TRAINING PROGRAM

## 2025-02-10 PROCEDURE — 1123F ACP DISCUSS/DSCN MKR DOCD: CPT | Performed by: STUDENT IN AN ORGANIZED HEALTH CARE EDUCATION/TRAINING PROGRAM

## 2025-02-10 PROCEDURE — 3075F SYST BP GE 130 - 139MM HG: CPT | Performed by: STUDENT IN AN ORGANIZED HEALTH CARE EDUCATION/TRAINING PROGRAM

## 2025-02-10 RX ORDER — MONTELUKAST SODIUM 10 MG/1
10 TABLET ORAL DAILY
Qty: 90 TABLET | Refills: 0 | Status: SHIPPED | OUTPATIENT
Start: 2025-02-10 | End: 2025-05-11

## 2025-02-10 RX ORDER — BUDESONIDE AND FORMOTEROL FUMARATE DIHYDRATE 160; 4.5 UG/1; UG/1
2 AEROSOL RESPIRATORY (INHALATION) 2 TIMES DAILY
Qty: 3 EACH | Refills: 3 | Status: SHIPPED | OUTPATIENT
Start: 2025-02-10 | End: 2026-02-09

## 2025-02-10 NOTE — PROGRESS NOTES
After obtaining consent, and per orders of Dr. Palmer, injection of Nucala given in Right arm by Janna Lai MA. Patient instructed to remain in clinic for 20 minutes afterwards, and to report any adverse reaction to me immediately.

## 2025-02-10 NOTE — PROGRESS NOTES
[facility]  Office visit follow-up  ______________________________________________________________________________    Patient: Saima Ramos  YOB: 1955   MRN: [id number]    Acct:      Today's date: 02/10/25    Chief complaint   Follow up     History of present illness     A 70 y.o. female       Pt seen and Chart reviewed.  Saima Ramos is here in followup for persistent severe asthma requiring biologic injection.    Interval history: 02/10/25  History of Present Illness  The patient presents for evaluation of asthma.    She reports a decline in her respiratory function, which she attributes to a recent change in her medication regimen. She has been using the Symbicort inhaler once daily for the past 1.5 weeks, a deviation from her usual twice-daily usage. She anticipates receiving her Symbicort refill on 02/11/2025.  Otherwise when she uses the Symbicort twice a day her respiratory symptoms is very under control.  Currently she is reporting minimal worsening of shortness of breathing.  Otherwise she denies significant cough and wheezing.    She continues to receive Nucala injections at our facility, expressing discomfort with self-administration at home. She also uses an albuterol inhaler as needed, typically once daily, but increases the frequency during episodes of shortness of breath.  No recent asthma exacerbation.                 ROS:     Constitutional:no fever, no chills  HEENT: no runny nose, no sore throat  Respiratory: Minimal intermittent dyspnea on exertion, dyspnea, no cough, no wheeze, no sputum production  CVS: no chest pain, no palpitations, no syncope  Gi: no abdominal pain, no nausea, no vomiting, no diarrhea.  MSK: no myalgia, no joint pain.  Skin: no rash  Neurological: no weakness    Severity: MRCA grading:   [] Grade I Not troubled by breathlessness except on strenuous exercise   [x] Grade II Short of breath when hurrying on a level

## 2025-03-13 ENCOUNTER — OFFICE VISIT (OUTPATIENT)
Dept: PULMONOLOGY | Age: 70
End: 2025-03-13
Payer: MEDICARE

## 2025-03-13 VITALS
OXYGEN SATURATION: 100 % | HEART RATE: 85 BPM | SYSTOLIC BLOOD PRESSURE: 136 MMHG | WEIGHT: 126 LBS | BODY MASS INDEX: 25.4 KG/M2 | HEIGHT: 59 IN | RESPIRATION RATE: 16 BRPM | DIASTOLIC BLOOD PRESSURE: 74 MMHG

## 2025-03-13 DIAGNOSIS — J45.50 SEVERE PERSISTENT ASTHMA, WELL-CONTROLLED (HCC): Primary | ICD-10-CM

## 2025-03-13 PROCEDURE — 96372 THER/PROPH/DIAG INJ SC/IM: CPT | Performed by: INTERNAL MEDICINE

## 2025-03-13 NOTE — PROGRESS NOTES
After obtaining consent, and per orders of Dr. Renteria, injection of Nucala given in Left arm by Kady Abad MA. Patient instructed to remain in clinic for 20 minutes afterwards, and to report any adverse reaction to me immediately. No complications

## 2025-03-25 ENCOUNTER — HOSPITAL ENCOUNTER (OUTPATIENT)
Age: 70
Discharge: HOME | End: 2025-03-25
Payer: MEDICARE

## 2025-03-25 DIAGNOSIS — E11.9 DIET-CONTROLLED DIABETES MELLITUS (HCC): ICD-10-CM

## 2025-03-25 DIAGNOSIS — I10 ESSENTIAL HYPERTENSION: ICD-10-CM

## 2025-03-25 DIAGNOSIS — J45.50 SEVERE PERSISTENT ASTHMA WITHOUT COMPLICATION (HCC): ICD-10-CM

## 2025-03-25 DIAGNOSIS — E78.2 MIXED HYPERLIPIDEMIA: ICD-10-CM

## 2025-03-25 LAB
ALBUMIN SERPL-MCNC: 4.1 G/DL (ref 3.5–5.2)
ALBUMIN/GLOB SERPL: 1.3 {RATIO} (ref 1–2.5)
ALP SERPL-CCNC: 103 U/L (ref 35–104)
ALT SERPL-CCNC: 18 U/L (ref 10–35)
ANION GAP SERPL CALCULATED.3IONS-SCNC: 10 MMOL/L (ref 9–16)
AST SERPL-CCNC: 26 U/L (ref 10–35)
BASOPHILS # BLD: <0.03 K/UL (ref 0–0.2)
BASOPHILS NFR BLD: 0 % (ref 0–2)
BILIRUB SERPL-MCNC: 0.5 MG/DL (ref 0–1.2)
BUN SERPL-MCNC: 16 MG/DL (ref 8–23)
CALCIUM SERPL-MCNC: 9.7 MG/DL (ref 8.6–10.4)
CHLORIDE SERPL-SCNC: 105 MMOL/L (ref 98–107)
CHOLEST SERPL-MCNC: 169 MG/DL (ref 0–199)
CHOLESTEROL/HDL RATIO: 3.1
CO2 SERPL-SCNC: 28 MMOL/L (ref 20–31)
CREAT SERPL-MCNC: 0.8 MG/DL (ref 0.6–0.9)
CREAT UR-MCNC: 80.4 MG/DL (ref 28–217)
EOSINOPHIL # BLD: <0.03 K/UL (ref 0–0.44)
EOSINOPHILS RELATIVE PERCENT: 0 % (ref 1–4)
ERYTHROCYTE [DISTWIDTH] IN BLOOD BY AUTOMATED COUNT: 13.8 % (ref 11.8–14.4)
EST. AVERAGE GLUCOSE BLD GHB EST-MCNC: 131 MG/DL
GFR, ESTIMATED: 79 ML/MIN/1.73M2
GLUCOSE SERPL-MCNC: 104 MG/DL (ref 74–99)
HBA1C MFR BLD: 6.2 % (ref 4–6)
HCT VFR BLD AUTO: 39 % (ref 36.3–47.1)
HDLC SERPL-MCNC: 55 MG/DL
HGB BLD-MCNC: 11.7 G/DL (ref 11.9–15.1)
IMM GRANULOCYTES # BLD AUTO: <0.03 K/UL (ref 0–0.3)
IMM GRANULOCYTES NFR BLD: 0 %
LDLC SERPL CALC-MCNC: 103 MG/DL (ref 0–100)
LYMPHOCYTES NFR BLD: 1.73 K/UL (ref 1.1–3.7)
LYMPHOCYTES RELATIVE PERCENT: 26 % (ref 24–43)
MCH RBC QN AUTO: 24.5 PG (ref 25.2–33.5)
MCHC RBC AUTO-ENTMCNC: 30 G/DL (ref 28.4–34.8)
MCV RBC AUTO: 81.6 FL (ref 82.6–102.9)
MICROALBUMIN UR-MCNC: <12 MG/L (ref 0–20)
MICROALBUMIN/CREAT UR-RTO: NORMAL MCG/MG CREAT (ref 0–25)
MONOCYTES NFR BLD: 0.42 K/UL (ref 0.1–1.2)
MONOCYTES NFR BLD: 6 % (ref 3–12)
NEUTROPHILS NFR BLD: 68 % (ref 36–65)
NEUTS SEG NFR BLD: 4.59 K/UL (ref 1.5–8.1)
NRBC BLD-RTO: 0 PER 100 WBC
PLATELET # BLD AUTO: 310 K/UL (ref 138–453)
PMV BLD AUTO: 10.3 FL (ref 8.1–13.5)
POTASSIUM SERPL-SCNC: 4.1 MMOL/L (ref 3.7–5.3)
PROT SERPL-MCNC: 7.3 G/DL (ref 6.6–8.7)
RBC # BLD AUTO: 4.78 M/UL (ref 3.95–5.11)
RBC # BLD: ABNORMAL 10*6/UL
SODIUM SERPL-SCNC: 143 MMOL/L (ref 136–145)
T4 FREE SERPL-MCNC: 1 NG/DL (ref 0.92–1.68)
TRIGL SERPL-MCNC: 53 MG/DL (ref 0–149)
TSH SERPL DL<=0.05 MIU/L-ACNC: 0.14 UIU/ML (ref 0.27–4.2)
VLDLC SERPL CALC-MCNC: 11 MG/DL (ref 1–30)
WBC OTHER # BLD: 6.8 K/UL (ref 3.5–11.3)

## 2025-03-25 PROCEDURE — 84443 ASSAY THYROID STIM HORMONE: CPT

## 2025-03-25 PROCEDURE — 82043 UR ALBUMIN QUANTITATIVE: CPT

## 2025-03-25 PROCEDURE — 82570 ASSAY OF URINE CREATININE: CPT

## 2025-03-25 PROCEDURE — 80061 LIPID PANEL: CPT

## 2025-03-25 PROCEDURE — 83036 HEMOGLOBIN GLYCOSYLATED A1C: CPT

## 2025-03-25 PROCEDURE — 84439 ASSAY OF FREE THYROXINE: CPT

## 2025-03-25 PROCEDURE — 36415 COLL VENOUS BLD VENIPUNCTURE: CPT

## 2025-03-25 PROCEDURE — 85025 COMPLETE CBC W/AUTO DIFF WBC: CPT

## 2025-03-25 PROCEDURE — 80053 COMPREHEN METABOLIC PANEL: CPT

## 2025-03-27 ENCOUNTER — RESULTS FOLLOW-UP (OUTPATIENT)
Dept: FAMILY MEDICINE CLINIC | Age: 70
End: 2025-03-27

## 2025-03-27 DIAGNOSIS — D64.9 ANEMIA, UNSPECIFIED TYPE: ICD-10-CM

## 2025-03-27 DIAGNOSIS — R79.89 LOW TSH LEVEL: Primary | ICD-10-CM

## 2025-03-27 DIAGNOSIS — R73.03 PREDIABETES: ICD-10-CM

## 2025-04-13 DIAGNOSIS — J45.50 SEVERE PERSISTENT ASTHMA, WELL-CONTROLLED (HCC): ICD-10-CM

## 2025-04-14 RX ORDER — MONTELUKAST SODIUM 10 MG/1
10 TABLET ORAL DAILY
Qty: 90 TABLET | Refills: 3 | Status: SHIPPED | OUTPATIENT
Start: 2025-04-14 | End: 2025-07-13

## 2025-04-14 NOTE — TELEPHONE ENCOUNTER
Last appointment: 2/10/25  Next appointment: 2/9/26  Last fill date: 2/10/25    Requested Prescriptions     Pending Prescriptions Disp Refills    montelukast (SINGULAIR) 10 MG tablet [Pharmacy Med Name: Montelukast Sodium 10 MG Oral Tablet] 90 tablet 3     Sig: TAKE 1 TABLET BY MOUTH DAILY       Per last dictation, patient is currently taking the pended medication(s). Please sign if agreeable. Thank you.

## 2025-04-16 ENCOUNTER — TELEPHONE (OUTPATIENT)
Dept: PULMONOLOGY | Age: 70
End: 2025-04-16

## 2025-04-16 ENCOUNTER — OFFICE VISIT (OUTPATIENT)
Dept: PULMONOLOGY | Age: 70
End: 2025-04-16

## 2025-04-16 VITALS
OXYGEN SATURATION: 100 % | RESPIRATION RATE: 16 BRPM | SYSTOLIC BLOOD PRESSURE: 152 MMHG | HEIGHT: 59 IN | BODY MASS INDEX: 26.41 KG/M2 | HEART RATE: 85 BPM | WEIGHT: 131 LBS | DIASTOLIC BLOOD PRESSURE: 66 MMHG

## 2025-04-16 DIAGNOSIS — J45.50 SEVERE PERSISTENT ASTHMA, WELL-CONTROLLED (HCC): Primary | ICD-10-CM

## 2025-04-16 RX ORDER — ALBUTEROL SULFATE 90 UG/1
2 INHALANT RESPIRATORY (INHALATION) EVERY 6 HOURS PRN
Qty: 1 EACH | Refills: 0 | Status: SHIPPED | OUTPATIENT
Start: 2025-04-16

## 2025-04-16 NOTE — PROGRESS NOTES
After obtaining consent, and per orders of Dr. Alexandra, injection of Nucala given in Left arm by Regine Escobar MA. Patient instructed to remain in clinic for 20 minutes afterwards, and to report any adverse reaction to me immediately.    No complications.

## 2025-04-16 NOTE — TELEPHONE ENCOUNTER
Pt was in office for Nucala injection only.  Pt requested a refill on her Albuterol.  She doesn't use it often, would like only 1 sent to her local pharmacy.  Please sign pending Rx.

## 2025-05-13 ENCOUNTER — OFFICE VISIT (OUTPATIENT)
Dept: FAMILY MEDICINE CLINIC | Age: 70
End: 2025-05-13
Payer: MEDICARE

## 2025-05-13 ENCOUNTER — CLINICAL SUPPORT (OUTPATIENT)
Dept: PULMONOLOGY | Age: 70
End: 2025-05-13

## 2025-05-13 VITALS
HEART RATE: 79 BPM | WEIGHT: 127 LBS | TEMPERATURE: 97.2 F | SYSTOLIC BLOOD PRESSURE: 142 MMHG | OXYGEN SATURATION: 97 % | DIASTOLIC BLOOD PRESSURE: 78 MMHG | BODY MASS INDEX: 25.65 KG/M2

## 2025-05-13 VITALS
HEIGHT: 59 IN | BODY MASS INDEX: 25.6 KG/M2 | SYSTOLIC BLOOD PRESSURE: 138 MMHG | WEIGHT: 127 LBS | OXYGEN SATURATION: 97 % | DIASTOLIC BLOOD PRESSURE: 72 MMHG | HEART RATE: 100 BPM | RESPIRATION RATE: 14 BRPM

## 2025-05-13 DIAGNOSIS — J45.50 SEVERE PERSISTENT ASTHMA, WELL-CONTROLLED (HCC): ICD-10-CM

## 2025-05-13 DIAGNOSIS — J45.50 SEVERE PERSISTENT ASTHMA, WELL-CONTROLLED (HCC): Primary | ICD-10-CM

## 2025-05-13 DIAGNOSIS — H25.9 AGE-RELATED CATARACT OF BOTH EYES, UNSPECIFIED AGE-RELATED CATARACT TYPE: Primary | ICD-10-CM

## 2025-05-13 PROCEDURE — 1123F ACP DISCUSS/DSCN MKR DOCD: CPT | Performed by: NURSE PRACTITIONER

## 2025-05-13 PROCEDURE — 1159F MED LIST DOCD IN RCRD: CPT | Performed by: NURSE PRACTITIONER

## 2025-05-13 PROCEDURE — 1160F RVW MEDS BY RX/DR IN RCRD: CPT | Performed by: NURSE PRACTITIONER

## 2025-05-13 PROCEDURE — 3077F SYST BP >= 140 MM HG: CPT | Performed by: NURSE PRACTITIONER

## 2025-05-13 PROCEDURE — 99213 OFFICE O/P EST LOW 20 MIN: CPT | Performed by: NURSE PRACTITIONER

## 2025-05-13 PROCEDURE — 3078F DIAST BP <80 MM HG: CPT | Performed by: NURSE PRACTITIONER

## 2025-05-13 RX ORDER — ALBUTEROL SULFATE 90 UG/1
2 INHALANT RESPIRATORY (INHALATION) EVERY 6 HOURS PRN
Qty: 1 EACH | Refills: 0 | Status: SHIPPED | OUTPATIENT
Start: 2025-05-13

## 2025-05-13 RX ORDER — PREDNISONE 20 MG/1
20 TABLET ORAL 2 TIMES DAILY
Qty: 10 TABLET | Refills: 0 | Status: SHIPPED | OUTPATIENT
Start: 2025-05-13 | End: 2025-05-18

## 2025-05-13 RX ORDER — MONTELUKAST SODIUM 10 MG/1
10 TABLET ORAL NIGHTLY
Qty: 90 TABLET | Refills: 3 | Status: SHIPPED | OUTPATIENT
Start: 2025-05-13 | End: 2026-05-13

## 2025-05-13 ASSESSMENT — ENCOUNTER SYMPTOMS
SHORTNESS OF BREATH: 0
ALLERGIC/IMMUNOLOGIC NEGATIVE: 1
ABDOMINAL PAIN: 0
EYES NEGATIVE: 1
EYE PAIN: 0
CHEST TIGHTNESS: 0
GASTROINTESTINAL NEGATIVE: 1
RESPIRATORY NEGATIVE: 1

## 2025-05-13 NOTE — PROGRESS NOTES
After obtaining consent, and per orders of Dr. Alexandra, injection of Nucala given in Left arm by Lupe Zheng MA. Patient instructed to remain in clinic for 20 minutes afterwards, and to report any adverse reaction to me immediately. No complications

## 2025-05-13 NOTE — PROGRESS NOTES
[facility]     Date of Visit:  2025  Patient Name: Saima Ramos   Patient :  1955     CHIEF COMPLAINT:     Saima Ramos is a 70 y.o. female who presents today for an general visit to be evaluated for the following condition(s):  Chief Complaint   Patient presents with    Congestion    Cough       HISTORY OF PRESENT ILLNESS:       HPI     Here today with concerns of cough and congestion for the past 4 days. Has been drinking lemon water to help keep hydrated.  Reports drinking 5- 16 oz bottles of lemon water yesterday. Has been taking robutussin for the past 3 days without any relief.  She went to pulmonology and was given an injection of Nucala.    She recently had an eye exam and was told she has cataracts in both eyes, but left eye is new.  She does not see an ophthalmologist at this time and would like a referral.     REVIEW OF SYSTEMS:      Review of Systems   Constitutional: Negative.  Negative for activity change and appetite change.   HENT: Negative.  Negative for ear pain, hearing loss and tinnitus.    Eyes: Negative.  Negative for pain and visual disturbance.   Respiratory: Negative.  Negative for chest tightness and shortness of breath.    Cardiovascular: Negative.  Negative for chest pain.   Gastrointestinal: Negative.  Negative for abdominal pain.   Endocrine: Negative.    Genitourinary: Negative.  Negative for dysuria, frequency and urgency.   Musculoskeletal: Negative.  Negative for arthralgias and myalgias.   Skin: Negative.    Allergic/Immunologic: Negative.    Neurological: Negative.  Negative for dizziness and light-headedness.   Hematological: Negative.    Psychiatric/Behavioral: Negative.  Negative for agitation and hallucinations.    All other systems reviewed and are negative.       REVIEWED INFORMATION      Current Outpatient Medications   Medication Sig Dispense Refill    montelukast (SINGULAIR) 10 MG tablet Take 1 tablet by mouth nightly 90 tablet 3

## 2025-05-16 ENCOUNTER — TELEPHONE (OUTPATIENT)
Dept: FAMILY MEDICINE CLINIC | Age: 70
End: 2025-05-16

## 2025-05-16 DIAGNOSIS — J45.50 SEVERE PERSISTENT ASTHMA, WELL-CONTROLLED (HCC): Primary | ICD-10-CM

## 2025-05-16 DIAGNOSIS — J98.8 RESPIRATORY INFECTION: ICD-10-CM

## 2025-05-16 RX ORDER — AZITHROMYCIN 250 MG/1
TABLET, FILM COATED ORAL
Qty: 6 TABLET | Refills: 0 | Status: SHIPPED | OUTPATIENT
Start: 2025-05-16 | End: 2025-05-26

## 2025-05-16 NOTE — TELEPHONE ENCOUNTER
Patient states that she is still coughing and is taking the albuterol and prednisone as prescribed. Says that you told her to call back if she wasn't better. Please advise.

## 2025-05-20 DIAGNOSIS — K21.9 GASTROESOPHAGEAL REFLUX DISEASE WITHOUT ESOPHAGITIS: ICD-10-CM

## 2025-05-20 DIAGNOSIS — I10 ESSENTIAL HYPERTENSION: ICD-10-CM

## 2025-05-21 NOTE — TELEPHONE ENCOUNTER
Saima Ramos is calling to request a refill on the following medication(s):    Medication Request:  Requested Prescriptions     Pending Prescriptions Disp Refills    losartan (COZAAR) 50 MG tablet [Pharmacy Med Name: Losartan Potassium 50 MG Oral Tablet] 90 tablet 1     Sig: TAKE 1 TABLET BY MOUTH DAILY    omeprazole (PRILOSEC) 20 MG delayed release capsule [Pharmacy Med Name: Omeprazole 20 MG Oral Capsule Delayed Release] 90 capsule 1     Sig: TAKE 1 CAPSULE BY MOUTH DAILY       Last Visit Date (If Applicable):  5/20/2024    Next Visit Date:    7/15/2025

## 2025-05-22 RX ORDER — OMEPRAZOLE 20 MG/1
20 CAPSULE, DELAYED RELEASE ORAL DAILY
Qty: 90 CAPSULE | Refills: 0 | Status: SHIPPED | OUTPATIENT
Start: 2025-05-22

## 2025-05-22 RX ORDER — LOSARTAN POTASSIUM 50 MG/1
50 TABLET ORAL DAILY
Qty: 90 TABLET | Refills: 0 | Status: SHIPPED | OUTPATIENT
Start: 2025-05-22

## 2025-06-06 ENCOUNTER — TELEPHONE (OUTPATIENT)
Dept: PULMONOLOGY | Age: 70
End: 2025-06-06

## 2025-06-06 NOTE — TELEPHONE ENCOUNTER
Received NUCALA shipment with a below temp indicator- called [facility] - New shjipment will be mailed 6/9/25 with an expected delivery date of 6/10/25       Transaction date pm on invoice was 5/5/25 -     Was transferred to pharmacy for return label request .

## 2025-06-09 ENCOUNTER — TELEPHONE (OUTPATIENT)
Dept: FAMILY MEDICINE CLINIC | Age: 70
End: 2025-06-09

## 2025-06-09 NOTE — TELEPHONE ENCOUNTER
Patient is calling because she was seen on 5/13/2025 for a cough finished both steroids and antibiotics but is still having a persistent cough patient wants to know what should she do or if she need another round of antibiotics. Please advise.

## 2025-06-09 NOTE — TELEPHONE ENCOUNTER
I didn't see any testing in the computer.. did she have flu or COVID swabs completed? Is her cough worse better or the same? I can always put in orders for XR chest if needed.

## 2025-06-09 NOTE — TELEPHONE ENCOUNTER
Patient will follow up with pulmonology has an appointment with them 6/16/2025 and will contact us if symptoms worsen.

## 2025-06-12 DIAGNOSIS — M81.0 AGE-RELATED OSTEOPOROSIS WITHOUT CURRENT PATHOLOGICAL FRACTURE: ICD-10-CM

## 2025-06-12 RX ORDER — ALENDRONATE SODIUM 70 MG/1
70 TABLET ORAL WEEKLY
Qty: 12 TABLET | Refills: 3 | Status: SHIPPED | OUTPATIENT
Start: 2025-06-12

## 2025-06-12 NOTE — TELEPHONE ENCOUNTER
Saima Ramos is calling to request a refill on the following medication(s):    Medication Request:  Requested Prescriptions     Pending Prescriptions Disp Refills    alendronate (FOSAMAX) 70 MG tablet 12 tablet 3     Sig: Take 1 tablet by mouth once a week       Last Visit Date (If Applicable):  5/13/2025    Next Visit Date:    7/15/2025      Last refill 4/5/24. Prescription pending .

## 2025-06-16 ENCOUNTER — OFFICE VISIT (OUTPATIENT)
Dept: PULMONOLOGY | Age: 70
End: 2025-06-16
Payer: MEDICARE

## 2025-06-16 VITALS
WEIGHT: 132 LBS | RESPIRATION RATE: 18 BRPM | SYSTOLIC BLOOD PRESSURE: 143 MMHG | DIASTOLIC BLOOD PRESSURE: 68 MMHG | OXYGEN SATURATION: 98 % | HEIGHT: 59 IN | BODY MASS INDEX: 26.61 KG/M2 | HEART RATE: 90 BPM

## 2025-06-16 DIAGNOSIS — J82.83 EOSINOPHILIC ASTHMA: ICD-10-CM

## 2025-06-16 DIAGNOSIS — J45.51: ICD-10-CM

## 2025-06-16 DIAGNOSIS — Z91.09 MULTIPLE ENVIRONMENTAL ALLERGIES: ICD-10-CM

## 2025-06-16 DIAGNOSIS — J45.50 SEVERE PERSISTENT ASTHMA, WELL-CONTROLLED (HCC): Primary | ICD-10-CM

## 2025-06-16 PROCEDURE — 96372 THER/PROPH/DIAG INJ SC/IM: CPT | Performed by: STUDENT IN AN ORGANIZED HEALTH CARE EDUCATION/TRAINING PROGRAM

## 2025-06-16 NOTE — PROGRESS NOTES
After obtaining consent, and per orders of Dr. Pedro, injection of Nucala given in Left arm by Janna Lai MA. Patient instructed to remain in clinic for 20 minutes afterwards, and to report any adverse reaction to me immediately.

## 2025-07-15 ENCOUNTER — HOSPITAL ENCOUNTER (OUTPATIENT)
Age: 70
Setting detail: SPECIMEN
Discharge: HOME | End: 2025-07-15

## 2025-07-15 ENCOUNTER — OFFICE VISIT (OUTPATIENT)
Dept: FAMILY MEDICINE CLINIC | Age: 70
End: 2025-07-15

## 2025-07-15 VITALS
WEIGHT: 130.8 LBS | SYSTOLIC BLOOD PRESSURE: 128 MMHG | TEMPERATURE: 98 F | BODY MASS INDEX: 26.37 KG/M2 | DIASTOLIC BLOOD PRESSURE: 84 MMHG | HEART RATE: 86 BPM | OXYGEN SATURATION: 98 % | HEIGHT: 59 IN

## 2025-07-15 DIAGNOSIS — R73.03 PRE-DIABETES: ICD-10-CM

## 2025-07-15 DIAGNOSIS — Z00.00 MEDICARE ANNUAL WELLNESS VISIT, SUBSEQUENT: Primary | ICD-10-CM

## 2025-07-15 DIAGNOSIS — R68.89 FORGETFULNESS: ICD-10-CM

## 2025-07-15 DIAGNOSIS — Z12.31 ENCOUNTER FOR SCREENING MAMMOGRAM FOR MALIGNANT NEOPLASM OF BREAST: ICD-10-CM

## 2025-07-15 DIAGNOSIS — D64.9 ANEMIA, UNSPECIFIED TYPE: ICD-10-CM

## 2025-07-15 DIAGNOSIS — R79.89 LOW TSH LEVEL: ICD-10-CM

## 2025-07-15 DIAGNOSIS — J45.50 SEVERE PERSISTENT ASTHMA WITHOUT COMPLICATION (HCC): ICD-10-CM

## 2025-07-15 DIAGNOSIS — I10 ESSENTIAL HYPERTENSION: ICD-10-CM

## 2025-07-15 DIAGNOSIS — Z23 IMMUNIZATION DUE: ICD-10-CM

## 2025-07-15 LAB
ALBUMIN SERPL-MCNC: 4 G/DL (ref 3.5–5.2)
ALBUMIN/GLOB SERPL: 1.1 {RATIO} (ref 1–2.5)
ALP SERPL-CCNC: 96 U/L (ref 35–104)
ALT SERPL-CCNC: 18 U/L (ref 10–35)
ANION GAP SERPL CALCULATED.3IONS-SCNC: 13 MMOL/L (ref 9–16)
AST SERPL-CCNC: 27 U/L (ref 10–35)
BASOPHILS # BLD: <0.03 K/UL (ref 0–0.2)
BASOPHILS NFR BLD: 0 % (ref 0–2)
BILIRUB SERPL-MCNC: 1.1 MG/DL (ref 0–1.2)
BILIRUB UR QL STRIP: NEGATIVE
BUN SERPL-MCNC: 13 MG/DL (ref 8–23)
CALCIUM SERPL-MCNC: 10 MG/DL (ref 8.6–10.4)
CHLORIDE SERPL-SCNC: 102 MMOL/L (ref 98–107)
CLARITY UR: CLEAR
CO2 SERPL-SCNC: 27 MMOL/L (ref 20–31)
COLOR UR: YELLOW
COMMENT: NORMAL
CREAT SERPL-MCNC: 0.8 MG/DL (ref 0.6–0.9)
EOSINOPHIL # BLD: <0.03 K/UL (ref 0–0.44)
EOSINOPHILS RELATIVE PERCENT: 0 % (ref 1–4)
ERYTHROCYTE [DISTWIDTH] IN BLOOD BY AUTOMATED COUNT: 14.1 % (ref 11.8–14.4)
EST. AVERAGE GLUCOSE BLD GHB EST-MCNC: 137 MG/DL
FERRITIN SERPL-MCNC: 35 NG/ML
FOLATE SERPL-MCNC: >40 NG/ML (ref 4.8–24.2)
GFR, ESTIMATED: 79 ML/MIN/1.73M2
GLUCOSE SERPL-MCNC: 104 MG/DL (ref 74–99)
GLUCOSE UR STRIP-MCNC: NEGATIVE MG/DL
HBA1C MFR BLD: 6.4 % (ref 4–6)
HCT VFR BLD AUTO: 39 % (ref 36.3–47.1)
HGB BLD-MCNC: 11.9 G/DL (ref 11.9–15.1)
HGB UR QL STRIP.AUTO: NEGATIVE
IMM GRANULOCYTES # BLD AUTO: 0.03 K/UL (ref 0–0.3)
IMM GRANULOCYTES NFR BLD: 0 %
IRON SATN MFR SERPL: 14 % (ref 20–55)
IRON SERPL-MCNC: 52 UG/DL (ref 37–145)
KETONES UR STRIP-MCNC: NEGATIVE MG/DL
LEUKOCYTE ESTERASE UR QL STRIP: NEGATIVE
LYMPHOCYTES NFR BLD: 1.46 K/UL (ref 1.1–3.7)
LYMPHOCYTES RELATIVE PERCENT: 17 % (ref 24–43)
MAGNESIUM SERPL-MCNC: 2.2 MG/DL (ref 1.6–2.4)
MCH RBC QN AUTO: 25.2 PG (ref 25.2–33.5)
MCHC RBC AUTO-ENTMCNC: 30.5 G/DL (ref 28.4–34.8)
MCV RBC AUTO: 82.6 FL (ref 82.6–102.9)
MONOCYTES NFR BLD: 0.45 K/UL (ref 0.1–1.2)
MONOCYTES NFR BLD: 5 % (ref 3–12)
NEUTROPHILS NFR BLD: 78 % (ref 36–65)
NEUTS SEG NFR BLD: 6.71 K/UL (ref 1.5–8.1)
NITRITE UR QL STRIP: NEGATIVE
NRBC BLD-RTO: 0 PER 100 WBC
PH UR STRIP: 8 [PH] (ref 5–8)
PLATELET # BLD AUTO: 344 K/UL (ref 138–453)
PMV BLD AUTO: 10.5 FL (ref 8.1–13.5)
POTASSIUM SERPL-SCNC: 3.9 MMOL/L (ref 3.7–5.3)
PROT SERPL-MCNC: 7.5 G/DL (ref 6.6–8.7)
PROT UR STRIP-MCNC: NEGATIVE MG/DL
RBC # BLD AUTO: 4.72 M/UL (ref 3.95–5.11)
SODIUM SERPL-SCNC: 142 MMOL/L (ref 136–145)
SP GR UR STRIP: 1.01 (ref 1–1.03)
T4 FREE SERPL-MCNC: 0.9 NG/DL (ref 0.9–1.7)
TIBC SERPL-MCNC: 382 UG/DL (ref 250–450)
TSH SERPL DL<=0.05 MIU/L-ACNC: 0.31 UIU/ML (ref 0.27–4.2)
UNSATURATED IRON BINDING CAPACITY: 330 UG/DL (ref 112–347)
UROBILINOGEN UR STRIP-ACNC: NORMAL EU/DL (ref 0–1)
VIT B12 SERPL-MCNC: 904 PG/ML (ref 232–1245)
WBC OTHER # BLD: 8.7 K/UL (ref 3.5–11.3)

## 2025-07-15 SDOH — HEALTH STABILITY: PHYSICAL HEALTH: ON AVERAGE, HOW MANY MINUTES DO YOU ENGAGE IN EXERCISE AT THIS LEVEL?: 50 MIN

## 2025-07-15 SDOH — ECONOMIC STABILITY: FOOD INSECURITY: WITHIN THE PAST 12 MONTHS, THE FOOD YOU BOUGHT JUST DIDN'T LAST AND YOU DIDN'T HAVE MONEY TO GET MORE.: PATIENT DECLINED

## 2025-07-15 SDOH — ECONOMIC STABILITY: FOOD INSECURITY: WITHIN THE PAST 12 MONTHS, YOU WORRIED THAT YOUR FOOD WOULD RUN OUT BEFORE YOU GOT MONEY TO BUY MORE.: PATIENT DECLINED

## 2025-07-15 SDOH — HEALTH STABILITY: PHYSICAL HEALTH: ON AVERAGE, HOW MANY DAYS PER WEEK DO YOU ENGAGE IN MODERATE TO STRENUOUS EXERCISE (LIKE A BRISK WALK)?: 5 DAYS

## 2025-07-15 ASSESSMENT — PATIENT HEALTH QUESTIONNAIRE - PHQ9
SUM OF ALL RESPONSES TO PHQ QUESTIONS 1-9: 0
SUM OF ALL RESPONSES TO PHQ QUESTIONS 1-9: 0
1. LITTLE INTEREST OR PLEASURE IN DOING THINGS: NOT AT ALL
SUM OF ALL RESPONSES TO PHQ QUESTIONS 1-9: 0
2. FEELING DOWN, DEPRESSED OR HOPELESS: NOT AT ALL
SUM OF ALL RESPONSES TO PHQ QUESTIONS 1-9: 0

## 2025-07-15 ASSESSMENT — ENCOUNTER SYMPTOMS
ALLERGIC/IMMUNOLOGIC NEGATIVE: 1
COLOR CHANGE: 0
GASTROINTESTINAL NEGATIVE: 1
COUGH: 0
SHORTNESS OF BREATH: 1
CHEST TIGHTNESS: 0
EYES NEGATIVE: 1
WHEEZING: 0
NAUSEA: 0
VOMITING: 0
STRIDOR: 0
DIARRHEA: 0

## 2025-07-15 ASSESSMENT — LIFESTYLE VARIABLES
HOW MANY STANDARD DRINKS CONTAINING ALCOHOL DO YOU HAVE ON A TYPICAL DAY: PATIENT DOES NOT DRINK
HOW OFTEN DO YOU HAVE A DRINK CONTAINING ALCOHOL: 1
HOW MANY STANDARD DRINKS CONTAINING ALCOHOL DO YOU HAVE ON A TYPICAL DAY: 0
HOW OFTEN DO YOU HAVE A DRINK CONTAINING ALCOHOL: NEVER
HOW OFTEN DO YOU HAVE SIX OR MORE DRINKS ON ONE OCCASION: 1

## 2025-07-15 NOTE — PROGRESS NOTES
Medicare Annual Wellness Visit    Saima Ramos is here for Medicare AWV, Memory Loss, and Medication Refill (Lotrisone cream)    Assessment & Plan  1. Medicare annual wellness visit.  - Her thyroid function was slightly abnormal during the last check in 03/2025. She has been taking a powdered vegetable supplement for the past 5 weeks and reports feeling better.  - Advised that supplements are not well studied and can cause GI side effects. If she experiences any GI side effects from the supplement, she should discontinue its use.  - A repeat thyroid lab will be ordered. She was advised to receive the RSV vaccine and whooping cough vaccine at the pharmacy.  - The updated pneumonia vaccine will be administered if available.    2. Memory loss.  - Reports increased forgetfulness over the years, including difficulty remembering recent events and directions.  - Potential contributing factors such as B12 deficiency, folate deficiency, and thyroid issues were discussed.  - An MMSE will be conducted today. Blood work and urine tests will be ordered to assess her memory loss.  - If her memory loss progresses, an MRI of the brain may be considered to rule out stroke, tumor, or bleeding.    3. Asthma.  - Continues to see a Pulmonologist and is on Nucala, Singulair, Symbicort, and an inhaler as needed.  - Reports no new symptoms such as wheezing, shortness of breath, chest tightness, or cough.  - No changes in breathing reported during visits to [facility].  - Advised to get the RSV vaccine due to asthma.    4. Hypertension.  - Currently on losartan for blood pressure management.  - No new symptoms or changes reported.  - Blood pressure medication effectiveness will be monitored.    5. Prediabetes.  - No new symptoms or changes reported.  - Blood glucose levels will be monitored.    6. Hyperlipidemia.  - On Lipitor for cholesterol management.  - No new symptoms or changes reported.  - Lipid levels will be monitored.    7.

## 2025-07-15 NOTE — PATIENT INSTRUCTIONS
Press firmly, and move the brush in small circles over the surface of the teeth.  Be careful not to brush too hard. Vigorous brushing can make the gums pull away from the teeth and can scratch the tooth enamel.  Brush all surfaces of the teeth, on the tongue side and on the cheek side. Pay special attention to the front teeth and all surfaces of the back teeth.  Brush chewing surfaces with short back-and-forth strokes.  After you've finished, help the person rinse the remaining toothpaste from their mouth.  Where can you learn more?  Go to https://www.[employer].net/patientEd and enter F944 to learn more about \"Learning About Dental Care for Older Adults.\"  Current as of: July 31, 2024  Content Version: 14.5  © 2024-2025 [employer].   Care instructions adapted under license by [employer]. If you have questions about a medical condition or this instruction, always ask your healthcare professional. "[employer], disclaims any warranty or liability for your use of this information.         Learning About Vision Tests  What are vision tests?     The four most common vision tests are visual acuity tests, refraction, visual field tests, and color vision tests.  Visual acuity (sharpness) tests  These tests are used:  To see if you need glasses or contact lenses.  To monitor an eye problem.  To check an eye injury.  Visual acuity tests are done as part of routine exams. You may also have this test when you get your 's license or apply for some types of jobs.  Visual field tests  These tests are used:  To check for vision loss in any area of your range of vision.  To screen for certain eye diseases.  To look for nerve damage after a stroke, head injury, or other problem that could reduce blood flow to the brain.  Refraction and color tests  A refraction test is done to find the right prescription for glasses and contact lenses.  A color vision test is done to check for color blindness.  Color

## 2025-07-16 LAB
MICROORGANISM SPEC CULT: NORMAL
SERVICE CMNT-IMP: NORMAL
SPECIMEN DESCRIPTION: NORMAL

## 2025-07-17 ENCOUNTER — OFFICE VISIT (OUTPATIENT)
Dept: PULMONOLOGY | Age: 70
End: 2025-07-17
Payer: MEDICARE

## 2025-07-17 VITALS
BODY MASS INDEX: 26 KG/M2 | OXYGEN SATURATION: 100 % | SYSTOLIC BLOOD PRESSURE: 149 MMHG | WEIGHT: 129 LBS | HEART RATE: 76 BPM | DIASTOLIC BLOOD PRESSURE: 85 MMHG | HEIGHT: 59 IN

## 2025-07-17 DIAGNOSIS — J45.50 SEVERE PERSISTENT ASTHMA, WELL-CONTROLLED (HCC): Primary | ICD-10-CM

## 2025-07-17 PROBLEM — D64.9 ANEMIA: Status: RESOLVED | Noted: 2025-07-15 | Resolved: 2025-07-17

## 2025-07-17 PROBLEM — R23.8 SKIN SENSITIVITY: Status: RESOLVED | Noted: 2021-08-10 | Resolved: 2025-07-17

## 2025-07-17 PROBLEM — R79.89 LOW TSH LEVEL: Status: RESOLVED | Noted: 2025-07-15 | Resolved: 2025-07-17

## 2025-07-17 PROCEDURE — 96372 THER/PROPH/DIAG INJ SC/IM: CPT | Performed by: INTERNAL MEDICINE

## 2025-07-17 NOTE — PROGRESS NOTES
After obtaining consent, and per orders of Dr. Perry , injection of Nucala  given in Right arm by Belen Mark. Patient instructed to remain in clinic for 20 minutes afterwards, and to report any adverse reaction to me immediately.  No complications . TS

## 2025-07-23 DIAGNOSIS — I10 ESSENTIAL HYPERTENSION: ICD-10-CM

## 2025-07-23 DIAGNOSIS — K21.9 GASTROESOPHAGEAL REFLUX DISEASE WITHOUT ESOPHAGITIS: ICD-10-CM

## 2025-07-23 NOTE — TELEPHONE ENCOUNTER
Saima Ramos is calling to request a refill on the following medication(s):    Medication Request:  Requested Prescriptions     Pending Prescriptions Disp Refills    omeprazole (PRILOSEC) 20 MG delayed release capsule [Pharmacy Med Name: Omeprazole 20 MG Oral Capsule Delayed Release] 90 capsule 1     Sig: TAKE 1 CAPSULE BY MOUTH DAILY    losartan (COZAAR) 50 MG tablet [Pharmacy Med Name: Losartan Potassium 50 MG Oral Tablet] 90 tablet 1     Sig: TAKE 1 TABLET BY MOUTH DAILY       Last Visit Date (If Applicable):  7/15/2025    Next Visit Date:    1/15/2026

## 2025-07-24 RX ORDER — LOSARTAN POTASSIUM 50 MG/1
50 TABLET ORAL DAILY
Qty: 90 TABLET | Refills: 3 | Status: SHIPPED | OUTPATIENT
Start: 2025-07-24

## 2025-07-24 RX ORDER — OMEPRAZOLE 20 MG/1
20 CAPSULE, DELAYED RELEASE ORAL DAILY
Qty: 90 CAPSULE | Refills: 3 | Status: SHIPPED | OUTPATIENT
Start: 2025-07-24

## 2025-07-30 DIAGNOSIS — J45.50 SEVERE PERSISTENT ASTHMA, WELL-CONTROLLED (HCC): ICD-10-CM

## 2025-07-30 RX ORDER — MONTELUKAST SODIUM 10 MG/1
TABLET ORAL
Qty: 30 TABLET | Refills: 0 | Status: SHIPPED | OUTPATIENT
Start: 2025-07-30

## 2025-07-30 NOTE — TELEPHONE ENCOUNTER
Last appointment: 2/10/25  Next appointment: 2/9/26  Last fill date: 4/14/25    Requested Prescriptions     Pending Prescriptions Disp Refills    montelukast (SINGULAIR) 10 MG tablet [Pharmacy Med Name: MONTELUKAST TAB] 30 tablet 0     Sig: Take one tablet by mouth nightly       Per last dictation, patient is currently taking the pended medication(s). Please sign if agreeable. Thank you.

## 2025-08-14 ENCOUNTER — OFFICE VISIT (OUTPATIENT)
Dept: PULMONOLOGY | Age: 70
End: 2025-08-14

## 2025-08-14 VITALS
SYSTOLIC BLOOD PRESSURE: 128 MMHG | WEIGHT: 125 LBS | RESPIRATION RATE: 12 BRPM | HEIGHT: 59 IN | HEART RATE: 78 BPM | DIASTOLIC BLOOD PRESSURE: 76 MMHG | BODY MASS INDEX: 25.2 KG/M2 | OXYGEN SATURATION: 100 %

## 2025-08-14 DIAGNOSIS — J45.50 SEVERE PERSISTENT ASTHMA, WELL-CONTROLLED (HCC): Primary | ICD-10-CM

## 2025-08-19 DIAGNOSIS — J45.50 SEVERE PERSISTENT ASTHMA, WELL-CONTROLLED (HCC): ICD-10-CM

## 2025-08-19 RX ORDER — MONTELUKAST SODIUM 10 MG/1
10 TABLET ORAL NIGHTLY
Qty: 90 TABLET | Refills: 1 | Status: SHIPPED | OUTPATIENT
Start: 2025-08-19